# Patient Record
Sex: MALE | Race: WHITE | NOT HISPANIC OR LATINO | Employment: OTHER | URBAN - NONMETROPOLITAN AREA
[De-identification: names, ages, dates, MRNs, and addresses within clinical notes are randomized per-mention and may not be internally consistent; named-entity substitution may affect disease eponyms.]

---

## 2020-12-10 ENCOUNTER — OFFICE VISIT (OUTPATIENT)
Dept: CARDIOLOGY CLINIC | Facility: CLINIC | Age: 65
End: 2020-12-10
Payer: COMMERCIAL

## 2020-12-10 VITALS — HEART RATE: 53 BPM | DIASTOLIC BLOOD PRESSURE: 88 MMHG | SYSTOLIC BLOOD PRESSURE: 136 MMHG | WEIGHT: 246 LBS

## 2020-12-10 DIAGNOSIS — J45.909 UNCOMPLICATED ASTHMA, UNSPECIFIED ASTHMA SEVERITY, UNSPECIFIED WHETHER PERSISTENT: ICD-10-CM

## 2020-12-10 DIAGNOSIS — I10 ESSENTIAL HYPERTENSION: Primary | ICD-10-CM

## 2020-12-10 DIAGNOSIS — R53.83 FATIGUE, UNSPECIFIED TYPE: ICD-10-CM

## 2020-12-10 DIAGNOSIS — R20.2 PARESTHESIAS: ICD-10-CM

## 2020-12-10 DIAGNOSIS — Z95.1 S/P CABG (CORONARY ARTERY BYPASS GRAFT): ICD-10-CM

## 2020-12-10 DIAGNOSIS — I25.10 CORONARY ARTERY DISEASE INVOLVING NATIVE CORONARY ARTERY OF NATIVE HEART WITHOUT ANGINA PECTORIS: ICD-10-CM

## 2020-12-10 DIAGNOSIS — E78.2 MIXED HYPERLIPIDEMIA: ICD-10-CM

## 2020-12-10 PROCEDURE — 99205 OFFICE O/P NEW HI 60 MIN: CPT | Performed by: PHYSICIAN ASSISTANT

## 2020-12-10 PROCEDURE — 93000 ELECTROCARDIOGRAM COMPLETE: CPT | Performed by: PHYSICIAN ASSISTANT

## 2020-12-10 RX ORDER — METOPROLOL SUCCINATE 25 MG/1
25 TABLET, EXTENDED RELEASE ORAL DAILY
Qty: 30 TABLET | Refills: 5 | Status: SHIPPED | OUTPATIENT
Start: 2020-12-10 | End: 2021-01-21

## 2020-12-10 RX ORDER — EZETIMIBE 10 MG/1
10 TABLET ORAL DAILY
COMMUNITY
End: 2020-12-10

## 2020-12-10 RX ORDER — BUDESONIDE AND FORMOTEROL FUMARATE DIHYDRATE 160; 4.5 UG/1; UG/1
2 AEROSOL RESPIRATORY (INHALATION) 2 TIMES DAILY
Qty: 1 INHALER | Refills: 3 | Status: SHIPPED | OUTPATIENT
Start: 2020-12-10 | End: 2021-03-04 | Stop reason: SDUPTHER

## 2020-12-10 RX ORDER — AMLODIPINE BESYLATE 5 MG/1
5 TABLET ORAL DAILY
COMMUNITY
End: 2020-12-10

## 2020-12-10 RX ORDER — LOSARTAN POTASSIUM 50 MG/1
50 TABLET ORAL DAILY
Start: 2020-12-10 | End: 2021-03-03 | Stop reason: SDUPTHER

## 2020-12-10 RX ORDER — METOPROLOL SUCCINATE 100 MG/1
100 TABLET, EXTENDED RELEASE ORAL DAILY
COMMUNITY
End: 2020-12-10

## 2020-12-10 RX ORDER — ASPIRIN 81 MG/1
81 TABLET ORAL DAILY
COMMUNITY
End: 2022-07-28 | Stop reason: SDUPTHER

## 2020-12-10 RX ORDER — LOSARTAN POTASSIUM 100 MG/1
25 TABLET ORAL DAILY
COMMUNITY
End: 2020-12-10

## 2020-12-10 RX ORDER — ALBUTEROL SULFATE 90 UG/1
2 AEROSOL, METERED RESPIRATORY (INHALATION) EVERY 6 HOURS PRN
COMMUNITY
End: 2020-12-10 | Stop reason: SDUPTHER

## 2020-12-10 RX ORDER — ALBUTEROL SULFATE 90 UG/1
2 AEROSOL, METERED RESPIRATORY (INHALATION) EVERY 6 HOURS PRN
Qty: 1 INHALER | Refills: 3 | Status: SHIPPED | OUTPATIENT
Start: 2020-12-10

## 2020-12-10 RX ORDER — CLOPIDOGREL BISULFATE 75 MG/1
75 TABLET ORAL DAILY
COMMUNITY
End: 2020-12-10

## 2020-12-10 RX ORDER — BUDESONIDE AND FORMOTEROL FUMARATE DIHYDRATE 160; 4.5 UG/1; UG/1
2 AEROSOL RESPIRATORY (INHALATION) 2 TIMES DAILY
COMMUNITY
End: 2020-12-10 | Stop reason: SDUPTHER

## 2020-12-29 ENCOUNTER — CLINICAL SUPPORT (OUTPATIENT)
Dept: CARDIAC REHAB | Facility: HOSPITAL | Age: 65
End: 2020-12-29
Payer: COMMERCIAL

## 2020-12-29 DIAGNOSIS — Z95.1 S/P CABG (CORONARY ARTERY BYPASS GRAFT): ICD-10-CM

## 2020-12-29 PROCEDURE — 93797 PHYS/QHP OP CAR RHAB WO ECG: CPT

## 2020-12-31 ENCOUNTER — CLINICAL SUPPORT (OUTPATIENT)
Dept: CARDIAC REHAB | Facility: HOSPITAL | Age: 65
End: 2020-12-31
Payer: COMMERCIAL

## 2020-12-31 DIAGNOSIS — Z95.1 S/P CABG (CORONARY ARTERY BYPASS GRAFT): ICD-10-CM

## 2020-12-31 PROCEDURE — 93798 PHYS/QHP OP CAR RHAB W/ECG: CPT

## 2021-01-04 ENCOUNTER — CLINICAL SUPPORT (OUTPATIENT)
Dept: CARDIAC REHAB | Facility: HOSPITAL | Age: 66
End: 2021-01-04
Payer: COMMERCIAL

## 2021-01-04 DIAGNOSIS — Z95.1 S/P CABG (CORONARY ARTERY BYPASS GRAFT): ICD-10-CM

## 2021-01-04 PROCEDURE — 93798 PHYS/QHP OP CAR RHAB W/ECG: CPT

## 2021-01-06 ENCOUNTER — CLINICAL SUPPORT (OUTPATIENT)
Dept: CARDIAC REHAB | Facility: HOSPITAL | Age: 66
End: 2021-01-06
Payer: COMMERCIAL

## 2021-01-06 DIAGNOSIS — Z95.1 S/P CABG (CORONARY ARTERY BYPASS GRAFT): ICD-10-CM

## 2021-01-06 PROCEDURE — 93798 PHYS/QHP OP CAR RHAB W/ECG: CPT

## 2021-01-08 ENCOUNTER — TELEPHONE (OUTPATIENT)
Dept: NEUROLOGY | Facility: CLINIC | Age: 66
End: 2021-01-08

## 2021-01-08 NOTE — TELEPHONE ENCOUNTER
Tried to call patient and set up an appointment (for a referral), but patient's phone isn't working and there is no option to leave a voicemail

## 2021-01-11 ENCOUNTER — CLINICAL SUPPORT (OUTPATIENT)
Dept: CARDIAC REHAB | Facility: HOSPITAL | Age: 66
End: 2021-01-11
Payer: COMMERCIAL

## 2021-01-11 DIAGNOSIS — Z95.1 S/P CABG (CORONARY ARTERY BYPASS GRAFT): ICD-10-CM

## 2021-01-11 PROCEDURE — 93798 PHYS/QHP OP CAR RHAB W/ECG: CPT

## 2021-01-13 ENCOUNTER — CLINICAL SUPPORT (OUTPATIENT)
Dept: CARDIAC REHAB | Facility: HOSPITAL | Age: 66
End: 2021-01-13
Payer: COMMERCIAL

## 2021-01-13 DIAGNOSIS — Z95.1 S/P CABG (CORONARY ARTERY BYPASS GRAFT): ICD-10-CM

## 2021-01-13 PROCEDURE — 93798 PHYS/QHP OP CAR RHAB W/ECG: CPT

## 2021-01-15 ENCOUNTER — CLINICAL SUPPORT (OUTPATIENT)
Dept: CARDIAC REHAB | Facility: HOSPITAL | Age: 66
End: 2021-01-15
Payer: COMMERCIAL

## 2021-01-15 DIAGNOSIS — Z95.1 S/P CABG (CORONARY ARTERY BYPASS GRAFT): ICD-10-CM

## 2021-01-15 DIAGNOSIS — I25.10 CORONARY ARTERY DISEASE INVOLVING NATIVE CORONARY ARTERY OF NATIVE HEART WITHOUT ANGINA PECTORIS: Primary | ICD-10-CM

## 2021-01-15 PROCEDURE — 93798 PHYS/QHP OP CAR RHAB W/ECG: CPT

## 2021-01-18 ENCOUNTER — CLINICAL SUPPORT (OUTPATIENT)
Dept: CARDIAC REHAB | Facility: HOSPITAL | Age: 66
End: 2021-01-18
Payer: COMMERCIAL

## 2021-01-18 ENCOUNTER — LAB (OUTPATIENT)
Dept: LAB | Facility: HOSPITAL | Age: 66
End: 2021-01-18
Payer: COMMERCIAL

## 2021-01-18 DIAGNOSIS — Z95.1 S/P CABG (CORONARY ARTERY BYPASS GRAFT): ICD-10-CM

## 2021-01-18 DIAGNOSIS — E78.2 MIXED HYPERLIPIDEMIA: ICD-10-CM

## 2021-01-18 DIAGNOSIS — R53.83 FATIGUE, UNSPECIFIED TYPE: ICD-10-CM

## 2021-01-18 LAB
BASOPHILS # BLD AUTO: 0 THOUSANDS/ΜL (ref 0–0.1)
BASOPHILS NFR BLD AUTO: 1 % (ref 0–2)
CHOLEST SERPL-MCNC: 244 MG/DL (ref 0–200)
EOSINOPHIL # BLD AUTO: 0.1 THOUSAND/ΜL (ref 0–0.61)
EOSINOPHIL NFR BLD AUTO: 2 % (ref 0–5)
ERYTHROCYTE [DISTWIDTH] IN BLOOD BY AUTOMATED COUNT: 14 % (ref 11.5–14.5)
HCT VFR BLD AUTO: 50.3 % (ref 42–47)
HDLC SERPL-MCNC: 49 MG/DL
HGB BLD-MCNC: 16.5 G/DL (ref 14–18)
LDLC SERPL CALC-MCNC: 155 MG/DL (ref 0–100)
LYMPHOCYTES # BLD AUTO: 1.3 THOUSANDS/ΜL (ref 0.6–4.47)
LYMPHOCYTES NFR BLD AUTO: 22 % (ref 21–51)
MCH RBC QN AUTO: 29.3 PG (ref 26–34)
MCHC RBC AUTO-ENTMCNC: 32.8 G/DL (ref 31–37)
MCV RBC AUTO: 89 FL (ref 81–99)
MONOCYTES # BLD AUTO: 0.5 THOUSAND/ΜL (ref 0.17–1.22)
MONOCYTES NFR BLD AUTO: 8 % (ref 2–12)
NEUTROPHILS # BLD AUTO: 4 THOUSANDS/ΜL (ref 1.4–6.5)
NEUTS SEG NFR BLD AUTO: 68 % (ref 42–75)
PLATELET # BLD AUTO: 199 THOUSANDS/UL (ref 149–390)
PMV BLD AUTO: 7.6 FL (ref 8.6–11.7)
RBC # BLD AUTO: 5.63 MILLION/UL (ref 4.3–5.9)
T4 FREE SERPL-MCNC: 0.98 NG/DL (ref 0.76–1.46)
TRIGL SERPL-MCNC: 199 MG/DL (ref 44–166)
TSH SERPL DL<=0.05 MIU/L-ACNC: 2.32 UIU/ML (ref 0.45–5.33)
WBC # BLD AUTO: 5.9 THOUSAND/UL (ref 4.8–10.8)

## 2021-01-18 PROCEDURE — 80061 LIPID PANEL: CPT

## 2021-01-18 PROCEDURE — 93798 PHYS/QHP OP CAR RHAB W/ECG: CPT

## 2021-01-18 PROCEDURE — 84443 ASSAY THYROID STIM HORMONE: CPT

## 2021-01-18 PROCEDURE — 84439 ASSAY OF FREE THYROXINE: CPT

## 2021-01-18 PROCEDURE — 36415 COLL VENOUS BLD VENIPUNCTURE: CPT | Performed by: PHYSICIAN ASSISTANT

## 2021-01-18 PROCEDURE — 85025 COMPLETE CBC W/AUTO DIFF WBC: CPT | Performed by: PHYSICIAN ASSISTANT

## 2021-01-20 ENCOUNTER — APPOINTMENT (OUTPATIENT)
Dept: CARDIAC REHAB | Facility: HOSPITAL | Age: 66
End: 2021-01-20
Payer: COMMERCIAL

## 2021-01-21 ENCOUNTER — OFFICE VISIT (OUTPATIENT)
Dept: CARDIOLOGY CLINIC | Facility: CLINIC | Age: 66
End: 2021-01-21
Payer: COMMERCIAL

## 2021-01-21 VITALS
HEART RATE: 80 BPM | HEIGHT: 76 IN | BODY MASS INDEX: 30.2 KG/M2 | DIASTOLIC BLOOD PRESSURE: 80 MMHG | WEIGHT: 248 LBS | SYSTOLIC BLOOD PRESSURE: 160 MMHG

## 2021-01-21 DIAGNOSIS — Z95.1 S/P CABG (CORONARY ARTERY BYPASS GRAFT): ICD-10-CM

## 2021-01-21 DIAGNOSIS — E78.2 MIXED HYPERLIPIDEMIA: Primary | ICD-10-CM

## 2021-01-21 DIAGNOSIS — I10 ESSENTIAL HYPERTENSION: ICD-10-CM

## 2021-01-21 DIAGNOSIS — I25.10 CORONARY ARTERY DISEASE INVOLVING NATIVE CORONARY ARTERY OF NATIVE HEART WITHOUT ANGINA PECTORIS: ICD-10-CM

## 2021-01-21 PROCEDURE — 99215 OFFICE O/P EST HI 40 MIN: CPT | Performed by: PHYSICIAN ASSISTANT

## 2021-01-21 RX ORDER — METOPROLOL SUCCINATE 25 MG/1
12.5 TABLET, EXTENDED RELEASE ORAL DAILY
Qty: 30 TABLET | Refills: 5 | Status: SHIPPED | OUTPATIENT
Start: 2021-01-21 | End: 2021-03-03 | Stop reason: SDUPTHER

## 2021-01-21 RX ORDER — TICAGRELOR 90 MG/1
90 TABLET ORAL ONCE
COMMUNITY
Start: 2021-01-10 | End: 2021-01-21

## 2021-01-21 RX ORDER — ATORVASTATIN CALCIUM 40 MG/1
40 TABLET, FILM COATED ORAL DAILY
Qty: 30 TABLET | Refills: 3 | Status: SHIPPED | OUTPATIENT
Start: 2021-01-21 | End: 2021-03-04

## 2021-01-21 RX ORDER — METOPROLOL SUCCINATE 25 MG/1
12.5 TABLET, EXTENDED RELEASE ORAL DAILY
Qty: 30 TABLET | Refills: 5 | Status: SHIPPED | OUTPATIENT
Start: 2021-01-21 | End: 2021-01-21

## 2021-01-21 NOTE — PATIENT INSTRUCTIONS
Return to taking metoprolol 12 5 mg daily at night   And Brilinta 60 mg twice daily    Return in one month for assessment of HR and BP     Check cholesterol levels in 4 months   Other Blood work  Must be fasting

## 2021-01-21 NOTE — ASSESSMENT & PLAN NOTE
Cath 2019: stent to OM2 and LCx  Cath 4/2020 stent to RCA and OM1   Now on Brilinta    Reduced to 90 mg daily on his own    Will go back to 60 BID     Ok to discontinue in April--a year from SHERIF  Robotic CABG 4/2020: LIMA-LAD-Diag    Stable without angina  Patient stopped metoprolol on his own and BP is now elevated  He will resume at 12 5 mg qHS

## 2021-01-21 NOTE — PROGRESS NOTES
[facility]   Outpatient Note  Viktoria Shaikh  1955  [id number]  [facility] [address] [place] [address] Drive, [address]  Laly Shaikh is a 72 y o  male    Assessment and Plan:   Coronary artery disease involving native coronary artery of native heart without angina pectoris  Cath 2019: stent to OM2 and LCx  Cath 4/2020 stent to RCA and OM1   Now on Brilinta    Reduced to 90 mg daily on his own    Will go back to 60 BID     Ok to discontinue in April--a year from SHERIF  Robotic CABG 4/2020: LIMA-LAD-Diag    Stable without angina  Patient stopped metoprolol on his own and BP is now elevated  He will resume at 12 5 mg qHS  S/P CABG (coronary artery bypass graft)  Robotic CABG x2 4/2020: LIMA-LAD-Diag   Stable without symptoms     Essential hypertension  Not optimally controlled at this point   He states that it is better controlled at home  He has stopped metoprolol on his own and is advised to resume   See comments above      Mixed hyperlipidemia  Not on a statin   LDL is 155  Will start lipitor 40 mg and check lipid profile in 4 months         Additional Plan:   Medications as detailed above  Pertinent testing orders as outlined  Available lab results are reviewed with the patient and any additional required labs are ordered as above  Return visit will be in one month with EKG or earlier if there are problems  The patient is encouraged to call in the meantime if there are questions or concerns  Subjective: The patient is seen in the office for a routine follow up regarding known CAD with streting in 2019 to OM2 and LCx and in 4/2020 to RCA and OM  He also had Robotic CABG x2 4/2020 with  LIMA to LAD and the diagonal vessels  EF on Cath was 55%  In addition to severe asthma, he has HTN and HLD  Labs show that LDL is 155    Prior to his NSTEMI in April he had paresthesias of both upper and lower extremities that mostly resolved with CABG  He has residual tingling sensation in the "A-finger" He has been very fatigues since his surgery and does not feel he has the energy to get his daily activities done  Otherwise he is recovering well  From a cardiac perspective, he is without complaints of chest pain or exertional dyspnea  He has no palpitations syncope or near syncope, and denies edema orthopnea or PND  He does not complain of TIA or CVA symptoms  Hypertension  Pertinent negatives include no chest pain, malaise/fatigue, orthopnea, palpitations, PND or shortness of breath  Coronary Artery Disease  Pertinent negatives include no chest pain, leg swelling, palpitations or shortness of breath  Social History  Social History     Tobacco Use   Smoking Status Never Smoker   Smokeless Tobacco Never Used   ,   Social History     Substance and Sexual Activity   Alcohol Use None   ,   Social History     Substance and Sexual Activity   Drug Use Not on file     Family History   Problem Relation Age of Onset    Heart disease Mother     Heart disease Father        Medical and Surgical History  Past Medical History:   Diagnosis Date    [facility] [address] )     Coronary artery disease     HTN (hypertension)     Hyperlipidemia     Myocardial infarction ([facility] [address] ) 02/2019    Stroke [facility])      Past Surgical History:   Procedure Laterality Date    CARDIAC CATHETERIZATION  04/17/2020    RCA: 80% distal lesion  LCX/OM2 stent placement  LAD with 70-80% lesion at D1 bifurcation   CARDIAC CATHETERIZATION  2019    OM2 and proximal LCX stenting      CORONARY ARTERY BYPASS GRAFT      CORONARY STENT PLACEMENT  2019    OM2 and proximal LCX stenting         Current Outpatient Medications:     albuterol (PROVENTIL HFA,VENTOLIN HFA) 90 mcg/act inhaler, Inhale 2 puffs every 6 (six) hours as needed for wheezing, Disp: 1 Inhaler, Rfl: 3    budesonide-formoterol (SYMBICORT) 160-4 5 mcg/act inhaler, Inhale 2 puffs 2 (two) times a day Rinse mouth after use , Disp: 1 Inhaler, Rfl: 3    losartan (COZAAR) 50 mg tablet, Take 1 tablet (50 mg total) by mouth daily, Disp: , Rfl:     aspirin (ECOTRIN LOW STRENGTH) 81 mg EC tablet, Take 81 mg by mouth daily, Disp: , Rfl:     atorvastatin (LIPITOR) 40 mg tablet, Take 1 tablet (40 mg total) by mouth daily, Disp: 30 tablet, Rfl: 3    metoprolol succinate (TOPROL-XL) 25 mg 24 hr tablet, Take 0 5 tablets (12 5 mg total) by mouth daily, Disp: 30 tablet, Rfl: 5    ticagrelor (Brilinta) 60 MG, Take 1 tablet (60 mg total) by mouth every 12 (twelve) hours, Disp: 60 tablet, Rfl: 5  No Known Allergies    Review of Systems   Constitution: Negative for malaise/fatigue  HENT: Negative  Eyes: Negative  Cardiovascular: Negative  Negative for chest pain, claudication, cyanosis, dyspnea on exertion, irregular heartbeat, leg swelling, near-syncope, orthopnea, palpitations, paroxysmal nocturnal dyspnea and syncope  Respiratory: Negative  Negative for cough, hemoptysis, shortness of breath, sleep disturbances due to breathing, snoring, sputum production and wheezing  Endocrine: Negative  Hematologic/Lymphatic: Negative  Skin: Negative  Musculoskeletal: Negative  Gastrointestinal: Negative  Genitourinary: Negative  Neurological: Positive for paresthesias (left hand "A finger")  Psychiatric/Behavioral: Negative  Allergic/Immunologic: Negative  Objective:   /80   Pulse 80   Ht 6' 4" (1 93 m)   Wt 112 kg (248 lb)   BMI 30 19 kg/m²   Physical Exam   Constitutional: He is oriented to person, place, and time  He appears well-developed and well-nourished  HENT:   Head: Normocephalic and atraumatic  Mouth/Throat: Oropharynx is clear and moist    Eyes: Conjunctivae are normal  No scleral icterus  Neck: Normal range of motion  Neck supple  No JVD present  No thyromegaly present     Cardiovascular: Regular rhythm, S1 normal, S2 normal and normal heart sounds  Bradycardia present  Exam reveals no gallop and no friction rub  No murmur heard  Pulmonary/Chest: No respiratory distress  He has no wheezes  He has no rales  Abdominal: Soft  Bowel sounds are normal  He exhibits no distension  There is no abdominal tenderness  Aorta not palpable   Musculoskeletal: Normal range of motion  General: No tenderness, deformity or edema  Neurological: He is alert and oriented to person, place, and time  Skin: Skin is warm and dry  Psychiatric: He has a normal mood and affect  His behavior is normal    Nursing note and vitals reviewed        Lab Review:   No results found for: CHOL  Lab Results   Component Value Date    HDL 49 01/18/2021     Lab Results   Component Value Date    LDLCALC 155 (H) 01/18/2021     Lab Results   Component Value Date    TRIG 199 (H) 01/18/2021     Results Reviewed     None        Results Reviewed     None        Results Reviewed     None          Recent Cardiovascular Testing:   Cath 2-: stent to OM2 and LCx    Cath 4/17/2020 stent to RCA and OM1    Robotic CABG 4/27/2020: LIMA-LAD-Diag      ECG Review:   Sinus bradycardia

## 2021-01-21 NOTE — ASSESSMENT & PLAN NOTE
Not optimally controlled at this point   He states that it is better controlled at home  He has stopped metoprolol on his own and is advised to resume   See comments above

## 2021-01-22 ENCOUNTER — LAB (OUTPATIENT)
Dept: LAB | Facility: HOSPITAL | Age: 66
End: 2021-01-22
Payer: COMMERCIAL

## 2021-01-22 ENCOUNTER — CLINICAL SUPPORT (OUTPATIENT)
Dept: CARDIAC REHAB | Facility: HOSPITAL | Age: 66
End: 2021-01-22
Payer: COMMERCIAL

## 2021-01-22 DIAGNOSIS — E78.2 MIXED HYPERLIPIDEMIA: Primary | ICD-10-CM

## 2021-01-22 DIAGNOSIS — I25.10 CORONARY ARTERY DISEASE INVOLVING NATIVE CORONARY ARTERY OF NATIVE HEART WITHOUT ANGINA PECTORIS: Primary | ICD-10-CM

## 2021-01-22 DIAGNOSIS — Z95.1 S/P CABG (CORONARY ARTERY BYPASS GRAFT): ICD-10-CM

## 2021-01-22 DIAGNOSIS — I10 ESSENTIAL HYPERTENSION: ICD-10-CM

## 2021-01-22 DIAGNOSIS — E78.2 MIXED HYPERLIPIDEMIA: ICD-10-CM

## 2021-01-22 LAB
ALBUMIN SERPL BCP-MCNC: 4.5 G/DL (ref 3.5–5.7)
ALP SERPL-CCNC: 62 U/L (ref 55–165)
ALT SERPL W P-5'-P-CCNC: 15 U/L (ref 7–52)
ANION GAP SERPL CALCULATED.3IONS-SCNC: 7 MMOL/L (ref 4–13)
AST SERPL W P-5'-P-CCNC: 16 U/L (ref 13–39)
BILIRUB SERPL-MCNC: 1.9 MG/DL (ref 0.2–1)
BUN SERPL-MCNC: 13 MG/DL (ref 7–25)
CALCIUM SERPL-MCNC: 9.9 MG/DL (ref 8.6–10.5)
CHLORIDE SERPL-SCNC: 98 MMOL/L (ref 98–107)
CHOLEST SERPL-MCNC: 228 MG/DL (ref 0–200)
CO2 SERPL-SCNC: 32 MMOL/L (ref 21–31)
CREAT SERPL-MCNC: 1.21 MG/DL (ref 0.7–1.3)
EST. AVERAGE GLUCOSE BLD GHB EST-MCNC: 126 MG/DL
GFR SERPL CREATININE-BSD FRML MDRD: 62 ML/MIN/1.73SQ M
GLUCOSE P FAST SERPL-MCNC: 102 MG/DL (ref 65–99)
HBA1C MFR BLD: 6 %
HDLC SERPL-MCNC: 48 MG/DL
LDLC SERPL CALC-MCNC: 133 MG/DL (ref 0–100)
POTASSIUM SERPL-SCNC: 4.1 MMOL/L (ref 3.5–5.5)
PROT SERPL-MCNC: 7.3 G/DL (ref 6.4–8.9)
SODIUM SERPL-SCNC: 137 MMOL/L (ref 134–143)
TRIGL SERPL-MCNC: 234 MG/DL (ref 44–166)

## 2021-01-22 PROCEDURE — 80061 LIPID PANEL: CPT

## 2021-01-22 PROCEDURE — 80053 COMPREHEN METABOLIC PANEL: CPT

## 2021-01-22 PROCEDURE — 36415 COLL VENOUS BLD VENIPUNCTURE: CPT | Performed by: PHYSICIAN ASSISTANT

## 2021-01-22 PROCEDURE — 83036 HEMOGLOBIN GLYCOSYLATED A1C: CPT | Performed by: PHYSICIAN ASSISTANT

## 2021-01-22 PROCEDURE — 93798 PHYS/QHP OP CAR RHAB W/ECG: CPT

## 2021-01-25 ENCOUNTER — CLINICAL SUPPORT (OUTPATIENT)
Dept: CARDIAC REHAB | Facility: HOSPITAL | Age: 66
End: 2021-01-25
Payer: COMMERCIAL

## 2021-01-25 DIAGNOSIS — Z95.1 S/P CABG (CORONARY ARTERY BYPASS GRAFT): ICD-10-CM

## 2021-01-25 PROCEDURE — 93798 PHYS/QHP OP CAR RHAB W/ECG: CPT

## 2021-01-27 ENCOUNTER — CLINICAL SUPPORT (OUTPATIENT)
Dept: CARDIAC REHAB | Facility: HOSPITAL | Age: 66
End: 2021-01-27
Payer: COMMERCIAL

## 2021-01-27 DIAGNOSIS — Z95.1 S/P CABG (CORONARY ARTERY BYPASS GRAFT): ICD-10-CM

## 2021-01-27 PROCEDURE — 93798 PHYS/QHP OP CAR RHAB W/ECG: CPT

## 2021-01-27 NOTE — PROGRESS NOTES
Cardiac Rehabilitation Plan of Care   30 Day Reassessment          Today's date: 2021   # of Exercise Sessions Completed:   Due to deductible patient has agreed to attend 2X week for 18 visits TO 24  Patient name: Charity Burns      : 1955  Age: 72 y o  MRN: [id number]  Referring Physician: John Archibald PA-C  Cardiologist: Dr Miles Augustin MD  Provider: [facility]  Clinician: ZHENG Basurto    Dx: S/P CABG X 2 2020  Hx SHERIF X2-RCA & OM1 2020  NSTEMI  Hx SHERIF 2019  HTN    Date of onset: 2020      SUMMARY OF PROGRESS:  Patient has completed 111 visits as of  this date  He performs his sessions  w/correct hemodynamic responses  His resting vitals were HR 76 and /72  His peak vitals were  and /72  His recovery vitals were HR 88 and /68  He performs his sessions on room air and is able to maintain his 02 saturation at 96% or greater  He rates his program a 4 to 5 a 4 on a 1-10 RPE Scale  He denies any symptoms  He is able to work consistently at a 3 95 Met Level  Nelson Rico has been  a good rehabilitation candidate due to high prior level of function, willingness to progress and support system  His program will continue to  be progressed as he is able to tolerate  He will continue to  receive education specific to his disease process  Nelson Rico has been progressing well in CR  He gives great effort during each session  He is working at a 3 95 MET Level  He has been asymptomatic  Telemetry reveals NSR w/T Wave Inversion  Medication compliance: Yes   Comments: Pt reports to be compliant with medications  Fall Risk: Low   Comments: Ambulates with a steady gait with no assist device    EKG changes: Telemetry reveals NSR  History of Sinus Bradycardia; Betablocker recently decreased by half w/good results      EXERCISE ASSESSMENT and PLAN    Current Exercise Program in Rehab:       Frequency: 3 days/week Supplement with home exercise 2+ days/wk as tolerated       Minutes: 40-45       METS: 3 5 to 4           HR: 20-30 > RHR 85-95   RPE: 4-6        Modalities: Treadmill, Airdyne bike, UBE, Lifecycle, NuStep and Recumbent bike      Exercise Progression 30 Day Goals :    Frequency: 3 days/week of cardiac rehab     Supplement with home exercise 2+ days/wk as tolerated    Minutes: 45 to 50                       >150 mins/wk of moderate intensity exercise   METS: 4 to 4 5   HR: 85-95 RPE:4-6   Modalities: Treadmill, Airdyne bike, UBE, Lifecycle, NuStep and Recumbent bike    Strength trainin-3 days / week  12-15 repetitions  1-2 sets per modality    Modalities: Leg Press    Home Exercise: Patient has been perfroming a walking program weather permitting       Goals: 10% improvement in functional capacity - based on max METs achieved in fitness assessment, Reduced dyspnea with physical activity  0-1/10, improved DASI score by 10%, Exercise 5 days/wk, >150mins/wk of moderate intensity exercise and Attend Rehab regularly    Progression Toward Goals:  Reviewed Pt goals and determined plan of care    Education: Benefit of exercise for CAD risk factors   Plan:education on home exercise guidelines, home exercise 30+ mins 2 days opposite CR and Education class: Risk Factors for Heart Disease  Readiness to change: Action:  (Changing behavior)      NUTRITION ASSESSMENT AND PLAN    Weight control:    Starting weight: 248   Current weight:   246  Waist circumference:    Starting:NA   Current:  NA  Diabetes: N/A  Lipid management: Discussed diet and lipid management    Goals:LDL <100, HDL >40, TRG <150, CHOL <200, fasting BG , improved A1c  < 7 0% and Improved Rate Your Plate score  >13    Progression Toward Goals: Reviewed Pt goals and determined plan of care    Education: heart healthy eating  low sodium diet  hydration  nutrition for  lipid management  nutrition for Improved BG control  exercise and diabetes management   Plan: Education class: Reading Food Labels, Education Class: Heart Healthy Eating, replace butter with soft spreads made with olive oil, canola or yogurt, replace refined grain bread with whole grain bread, replace unhealthy snacks with fruits & vegs, eat fewer desserts and sweets, avoid processed foods, will try new grains like brown rice, quinoa, farro, drink more water and keep added daily sugar <25g/day  Readiness to change: Preparation:  (Getting ready to change)       PSYCHOSOCIAL ASSESSMENT AND PLAN    Emotional:  Depression assessment:  PHQ-9 = 1-4 = Minimal Depression            Anxiety measure:  KATE-7 = 0-4  = Not anxious  Self-reported stress level:  5  Social support: Very Good    Goals:  Reduce perceived stress to 1-3/10, improved [facility] QOL < 27, PHQ-9 - reduced severity by one level, continue medical therapy, Physical Fitness in [facility] Score < 3, Social Support in [facility] Score < 3, Daily Activity in [facility] Score < 3, Overall Health in [facility] Score < 3, Quality of Life in [facility] Score < 3 , Change in Health in [facility] Score < 3  and improved positive thoughts of well being    Progression Toward Goals: Reviewed Pt goals and determined plan of care    Education: signs/sxs of depression, benefits of a positive support system, stress management techniques and class:  Stress and Your Health   Plan: Class: Stress and Your Health, Class: Relaxation, Practice relaxation techniques and Exercise  Readiness to change: Action:  (Changing behavior)      OTHER CORE COMPONENTS     Tobacco:   Social History     Tobacco Use   Smoking Status Never Smoker   Smokeless Tobacco Never Used       Tobacco Use Intervention:   N/A:  Patient is a non-smoker     Anginal Symptoms:  None   NTG use: No prescription    Blood pressure:    Restin/72   Exercise: 160/72   Recovery: 126/68    Goals: consistent BP < 130/80, reduced dietary sodium <2300mg, moderate intensity exercise >150 mins/wk and medication compliance    Progression Toward Goals: Reviewed Pt goals and determined plan of care    Education:  understanding high blood pressure and it's relationship to CAD, low sodium diet and HTN, Education class:  Common Heart Medications and Education class: Understanding Heart Disease  Plan: Class: Understanding Heart Disease and Class: Common Heart Medications  Readiness to change: Action:  (Changing behavior)

## 2021-02-01 ENCOUNTER — APPOINTMENT (OUTPATIENT)
Dept: CARDIAC REHAB | Facility: HOSPITAL | Age: 66
End: 2021-02-01
Payer: COMMERCIAL

## 2021-02-03 ENCOUNTER — CLINICAL SUPPORT (OUTPATIENT)
Dept: CARDIAC REHAB | Facility: HOSPITAL | Age: 66
End: 2021-02-03
Payer: COMMERCIAL

## 2021-02-03 DIAGNOSIS — Z95.1 S/P CABG (CORONARY ARTERY BYPASS GRAFT): ICD-10-CM

## 2021-02-03 PROCEDURE — 93798 PHYS/QHP OP CAR RHAB W/ECG: CPT

## 2021-02-10 ENCOUNTER — CLINICAL SUPPORT (OUTPATIENT)
Dept: CARDIAC REHAB | Facility: HOSPITAL | Age: 66
End: 2021-02-10
Payer: COMMERCIAL

## 2021-02-10 DIAGNOSIS — Z95.1 S/P CABG (CORONARY ARTERY BYPASS GRAFT): ICD-10-CM

## 2021-02-10 PROCEDURE — 93798 PHYS/QHP OP CAR RHAB W/ECG: CPT

## 2021-02-12 ENCOUNTER — CLINICAL SUPPORT (OUTPATIENT)
Dept: CARDIAC REHAB | Facility: HOSPITAL | Age: 66
End: 2021-02-12
Payer: COMMERCIAL

## 2021-02-12 DIAGNOSIS — Z95.1 S/P CABG (CORONARY ARTERY BYPASS GRAFT): ICD-10-CM

## 2021-02-12 PROCEDURE — 93798 PHYS/QHP OP CAR RHAB W/ECG: CPT

## 2021-02-17 ENCOUNTER — CLINICAL SUPPORT (OUTPATIENT)
Dept: CARDIAC REHAB | Facility: HOSPITAL | Age: 66
End: 2021-02-17
Payer: COMMERCIAL

## 2021-02-17 DIAGNOSIS — Z95.1 S/P CABG (CORONARY ARTERY BYPASS GRAFT): ICD-10-CM

## 2021-02-17 PROCEDURE — 93798 PHYS/QHP OP CAR RHAB W/ECG: CPT

## 2021-02-18 ENCOUNTER — OFFICE VISIT (OUTPATIENT)
Dept: CARDIOLOGY CLINIC | Facility: CLINIC | Age: 66
End: 2021-02-18
Payer: COMMERCIAL

## 2021-02-18 VITALS — SYSTOLIC BLOOD PRESSURE: 129 MMHG | DIASTOLIC BLOOD PRESSURE: 89 MMHG | HEART RATE: 68 BPM

## 2021-02-18 DIAGNOSIS — I25.10 CORONARY ARTERY DISEASE INVOLVING NATIVE CORONARY ARTERY OF NATIVE HEART WITHOUT ANGINA PECTORIS: Primary | ICD-10-CM

## 2021-02-18 PROCEDURE — 99214 OFFICE O/P EST MOD 30 MIN: CPT | Performed by: PHYSICIAN ASSISTANT

## 2021-02-18 RX ORDER — NITROGLYCERIN 0.4 MG/1
0.4 TABLET SUBLINGUAL
Qty: 25 TABLET | Refills: 3 | Status: SHIPPED | OUTPATIENT
Start: 2021-02-18 | End: 2022-06-22

## 2021-02-18 NOTE — PROGRESS NOTES
Virtual Brief Visit    Assessment/Plan:  Coronary artery disease involving native coronary artery of native heart without angina pectoris  Cath 2019: stent to OM2 and LCx  Cath 4/2020 stent to RCA and OM1   Now on Brilinta    Reduced to 90 mg daily on his own    Will go back to 60 BID     Ok to discontinue in April--a year from SHERIF  Robotic CABG 4/2020: LIMA-LAD-Diag    Possible anginal symptoms   Feeling very fatigued with exertion    Similar to feelings prior to intervention    Will schedule KOBY to be sure there is no ischemic cause       Mixed hyperlipidemia  Continues to tolerate statin therapy     S/P CABG (coronary artery bypass graft)  Robotic CABG x2 4/2020: LIMA-LAD-Diag   Now with fatigue on exertion   See comments     Essential hypertension  Much better controlled         Reason for visit is   Chief Complaint   Patient presents with    Fatigue    Virtual Brief Visit        Encounter provider Daljit Morales PA-C    Provider located at [address] [address]    Recent Visits  No visits were found meeting these conditions  Showing recent visits within past 7 days and meeting all other requirements     Today's Visits  Date Type Provider Dept   02/18/21 Office Visit Daljit Morales PA-C [facility]   Showing today's visits and meeting all other requirements     Future Appointments  No visits were found meeting these conditions  Showing future appointments within next 150 days and meeting all other requirements        After connecting through telephone, the patient was identified by name and date of birth  Charity Burns was informed that this is a telemedicine visit and that the visit is being conducted through telephone  My office door was closed  No one else was in the room  He acknowledged consent and understanding of privacy and security of the platform   The patient has agreed to participate and understands he can discontinue the visit at any time  Patient is aware this is a billable service  Subjective    Jaci Kelsey is a 72 y o  male  The patient is seen regarding CAD with streting in 2019 to OM2 and LCx and in 4/2020 to RCA and OM  He also had Robotic CABG x2 4/2020 with  LIMA to LAD and the diagonal vessels  EF on Cath was 55%  He is now complaining of fatigue with exertion that is similar to the feelings he had prior to his intervention  He has been continuing to exercise and due his daily activities but feels like he can't wait to get home to take a nap after everything he tries to do  He has no chest pain per se  He has no exertional dyspnea  He has no palpitations syncope or near syncope, and denies edema orthopnea or PND  He does not complain of TIA or CVA symptoms  Past Medical History:   Diagnosis Date    [facility] fibrillation ([facility] [address] )     Coronary artery disease     HTN (hypertension)     Hyperlipidemia     Myocardial infarction ([facility] [address] ) 02/2019    [facility])        Past Surgical History:   Procedure Laterality Date    CARDIAC CATHETERIZATION  04/17/2020    RCA: 80% distal lesion  LCX/OM2 stent placement  LAD with 70-80% lesion at D1 bifurcation   CARDIAC CATHETERIZATION  2019    OM2 and proximal LCX stenting   CORONARY ARTERY BYPASS GRAFT      CORONARY STENT PLACEMENT  2019    OM2 and proximal LCX stenting       Current Outpatient Medications   Medication Sig Dispense Refill    albuterol (PROVENTIL HFA,VENTOLIN HFA) 90 mcg/act inhaler Inhale 2 puffs every 6 (six) hours as needed for wheezing 1 Inhaler 3    aspirin (ECOTRIN LOW STRENGTH) 81 mg EC tablet Take 81 mg by mouth daily      atorvastatin (LIPITOR) 40 mg tablet Take 1 tablet (40 mg total) by mouth daily 30 tablet 3    budesonide-formoterol (SYMBICORT) 160-4 5 mcg/act inhaler Inhale 2 puffs 2 (two) times a day Rinse mouth after use   1 Inhaler 3    losartan (COZAAR) 50 mg tablet Take 1 tablet (50 mg total) by mouth daily      metoprolol succinate (TOPROL-XL) 25 mg 24 hr tablet Take 0 5 tablets (12 5 mg total) by mouth daily 30 tablet 5    ticagrelor (Brilinta) 60 MG Take 1 tablet (60 mg total) by mouth every 12 (twelve) hours 60 tablet 5    nitroglycerin (NITROSTAT) 0 4 mg SL tablet Place 1 tablet (0 4 mg total) under the tongue every 5 (five) minutes as needed for chest pain 25 tablet 3     No current facility-administered medications for this visit  No Known Allergies    Review of Systems   Constitutional: Positive for fatigue  Negative for fever  HENT: Negative  Negative for nosebleeds  Eyes: Negative  Negative for visual disturbance  Respiratory: Negative  Negative for chest tightness, shortness of breath and wheezing  Cardiovascular: Negative  Negative for chest pain, palpitations and leg swelling  Gastrointestinal: Negative  Negative for abdominal pain, blood in stool, nausea and vomiting  Genitourinary: Negative  Negative for hematuria  Musculoskeletal: Negative  Negative for arthralgias and myalgias  Skin: Negative  Negative for pallor and rash  Neurological: Negative  Negative for dizziness, syncope, weakness and light-headedness  Hematological: Negative  Does not bruise/bleed easily  All other systems reviewed and are negative  Vitals:    02/17/21 1622   BP: 129/89   Pulse: 68         I spent 20 minutes directly with the patient during this visit    [facility] acknowledges that he has consented to an online visit or consultation  He understands that the online visit is based solely on information provided by him, and that, in the absence of a face-to-face physical evaluation by the physician, the diagnosis he receives is both limited and provisional in terms of accuracy and completeness  This is not intended to replace a full medical face-to-face evaluation by the physician   Claudia Pan understands and accepts these terms

## 2021-02-18 NOTE — ASSESSMENT & PLAN NOTE
Cath 2019: stent to OM2 and LCx  Cath 4/2020 stent to RCA and OM1   Now on Brilinta    Reduced to 90 mg daily on his own    Will go back to 60 BID     Ok to discontinue in April--a year from SHERIF  Robotic CABG 4/2020: LIMA-LAD-Diag    Possible anginal symptoms   Feeling very fatigued with exertion    Similar to feelings prior to intervention    Will schedule KOBY to be sure there is no ischemic cause

## 2021-02-19 ENCOUNTER — CLINICAL SUPPORT (OUTPATIENT)
Dept: CARDIAC REHAB | Facility: HOSPITAL | Age: 66
End: 2021-02-19
Payer: COMMERCIAL

## 2021-02-19 DIAGNOSIS — Z95.1 S/P CABG (CORONARY ARTERY BYPASS GRAFT): ICD-10-CM

## 2021-02-19 PROCEDURE — 93798 PHYS/QHP OP CAR RHAB W/ECG: CPT

## 2021-02-22 ENCOUNTER — APPOINTMENT (OUTPATIENT)
Dept: CARDIAC REHAB | Facility: HOSPITAL | Age: 66
End: 2021-02-22
Payer: COMMERCIAL

## 2021-02-22 NOTE — PROGRESS NOTES
Cardiac Rehabilitation Plan of Care   60 Day Reassessment          Today's date: 2021   # of Exercise Sessions Completed:  to 36  Due to deductible patient has agreed to attend 2X week for 24 to 36  Patient name: Igor Brink      : 1955  Age: 72 y o  MRN: [id number]  Referring Physician: Gerda Mohs, PA-C  Cardiologist: Dr Joyce Powell MD  Provider: Kerline Lucas  Clinician: ZHENG Joseph    Dx: S/P CABG X 2 2020  Hx SHERIF X2-RCA & OM1 2020  NSTEMI  Hx SHERIF 2019  HTN    Date of onset: 2020      SUMMARY OF PROGRESS:  Patient has completed 16 visits as of this date  He performs his sessions w/correct hemodynamic responses  His resting vitals were HR 66 and /68  His peak vitals were  and /60  His recovery vitals were HR 75 and /82  He performs his sessions on room air and is able to maintain his 02 saturation at 96% or greater  He rates his program a 4 on a 1-10 RPE Scale  He denies any symptoms  He is able to work consistently at a 3 95 Met Level  Kendra Bailon has been a good rehabilitation candidate due to high prior level of function, willingness to progress and support system  His program will continue to  be progressed as he is able to tolerate  He will continue to receive education specific to his disease process  Kendra Bailon has been progressing well in CR  He gives great effort during each session  He is working at a 3 95 MET Level  He has been asymptomatic  Telemetry reveals NSR w/o T Wave Inversion in the last 30 days  Medication compliance: Yes   Comments: Pt reports to be compliant with medications  Fall Risk: Low   Comments: Ambulates with a steady gait with no assist device    EKG changes: Telemetry reveals NSR w/ no T Wave Inversion in the last 30 days  History of Sinus Bradycardia; Betablocker recently decreased by half w/good results      EXERCISE ASSESSMENT and PLAN    Current Exercise Program in Rehab:       Frequency: 3 days/week Supplement with home exercise 2+ days/wk as tolerated       Minutes: 40-45       METS: 3 5 to 4           HR: 20-30 > RHR 85-95   RPE: 4-6        Modalities: Treadmill, Airdyne bike, UBE, Lifecycle, NuStep and Recumbent bike      Exercise Progression 30 Day Goals :    Frequency: 3 days/week of cardiac rehab     Supplement with home exercise 2+ days/wk as tolerated    Minutes: 45 to 50                       >150 mins/wk of moderate intensity exercise   METS: 4 to 4 5   HR: 85-95 RPE:4-6   Modalities: Treadmill, Airdyne bike, UBE, Lifecycle, NuStep and Recumbent bike    Strength trainin-3 days / week  12-15 repetitions  1-2 sets per modality    Modalities: Leg Press    Home Exercise: Patient has been perfroming a walking program, weather permitting  Goals: 10% improvement in functional capacity - based on max METs achieved in fitness assessment, Reduced dyspnea with physical activity  0-1/10, improved DASI score by 10%, Exercise 5 days/wk, >150mins/wk of moderate intensity exercise and Attend Rehab regularly    Progression Toward Goals: Will continue to educate and progress as tolerated  Education: Benefit of exercise for CAD risk factors   Plan:education on home exercise guidelines, home exercise 30+ mins 2 days opposite CR and Education class: Risk Factors for Heart Disease  Readiness to change: Action:  (Changing behavior)      NUTRITION ASSESSMENT AND PLAN    Weight control:    Starting weight: 248   Current weight:   246  Waist circumference:    Starting:NA   Current:  NA  Diabetes: N/A  Lipid management: Discussed diet and lipid management    Goals:LDL <100, HDL >40, TRG <150, CHOL <200, fasting BG , improved A1c  < 7 0% and Improved Rate Your Plate score  >40    Progression Toward Goals: Will continue to educate and progress as tolerated      Education: heart healthy eating  low sodium diet  hydration  nutrition for  lipid management  nutrition for Improved BG control  exercise and diabetes management   Plan: Education class: Reading Food Labels, Education Class: Heart Healthy Eating, replace butter with soft spreads made with olive oil, canola or yogurt, replace refined grain bread with whole grain bread, replace unhealthy snacks with fruits & vegs, eat fewer desserts and sweets, avoid processed foods, will try new grains like brown rice, quinoa, farro, drink more water and keep added daily sugar <25g/day  Readiness to change: Action:  (Changing behavior)      PSYCHOSOCIAL ASSESSMENT AND PLAN    Emotional:  Depression assessment:  PHQ-9 = 1-4 = Minimal Depression            Anxiety measure:  KATE-7 = 0-4  = Not anxious  Self-reported stress level:  5  Social support: Very Good    Goals:  Reduce perceived stress to 1-3/10, improved [facility] QOL < 27, PHQ-9 - reduced severity by one level, continue medical therapy, Physical Fitness in [facility] Score < 3, Social Support in [facility] Score < 3, Daily Activity in [facility] Score < 3, Overall Health in [facility] Score < 3, Quality of Life in [facility] Score < 3 , Change in Health in [place] Score < 3  and improved positive thoughts of well being    Progression Toward Goals: Will continue to educate and progress as tolerated      Education: signs/sxs of depression, benefits of a positive support system, stress management techniques and class:  Stress and Your Health   Plan: Class: Stress and Your Health, Class: Relaxation, Practice relaxation techniques and Exercise  Readiness to change: Action:  (Changing behavior)      OTHER CORE COMPONENTS     Tobacco:   Social History     Tobacco Use   Smoking Status Never Smoker   Smokeless Tobacco Never Used       Tobacco Use Intervention:   N/A:  Patient is a non-smoker     Anginal Symptoms:  None   NTG use: No prescription    Blood pressure:    Restin/68   Exercise: 148/60   Recovery: 130/82    Goals: consistent BP < 130/80, reduced dietary sodium <2300mg, moderate intensity exercise >150 mins/wk and medication compliance    Progression Toward Goals: Will continue to educate and progress as tolerated      Education:  understanding high blood pressure and it's relationship to CAD, low sodium diet and HTN, Education class:  Common Heart Medications and Education class: Understanding Heart Disease  Plan: Class: Understanding Heart Disease and Class: Common Heart Medications  Readiness to change: Action:  (Changing behavior)

## 2021-02-24 ENCOUNTER — CLINICAL SUPPORT (OUTPATIENT)
Dept: CARDIAC REHAB | Facility: HOSPITAL | Age: 66
End: 2021-02-24
Payer: COMMERCIAL

## 2021-02-24 DIAGNOSIS — Z95.1 S/P CABG (CORONARY ARTERY BYPASS GRAFT): ICD-10-CM

## 2021-02-24 PROCEDURE — 93798 PHYS/QHP OP CAR RHAB W/ECG: CPT

## 2021-02-26 ENCOUNTER — CLINICAL SUPPORT (OUTPATIENT)
Dept: CARDIAC REHAB | Facility: HOSPITAL | Age: 66
End: 2021-02-26
Payer: COMMERCIAL

## 2021-02-26 DIAGNOSIS — Z95.1 S/P CABG (CORONARY ARTERY BYPASS GRAFT): ICD-10-CM

## 2021-02-26 PROCEDURE — 93798 PHYS/QHP OP CAR RHAB W/ECG: CPT

## 2021-03-01 ENCOUNTER — CLINICAL SUPPORT (OUTPATIENT)
Dept: CARDIAC REHAB | Facility: HOSPITAL | Age: 66
End: 2021-03-01
Payer: COMMERCIAL

## 2021-03-01 DIAGNOSIS — Z95.1 S/P CABG (CORONARY ARTERY BYPASS GRAFT): ICD-10-CM

## 2021-03-01 PROCEDURE — 93798 PHYS/QHP OP CAR RHAB W/ECG: CPT

## 2021-03-02 ENCOUNTER — HOSPITAL ENCOUNTER (OUTPATIENT)
Dept: NON INVASIVE DIAGNOSTICS | Facility: CLINIC | Age: 66
Discharge: HOME | End: 2021-03-02
Payer: COMMERCIAL

## 2021-03-02 DIAGNOSIS — I25.10 CORONARY ARTERY DISEASE INVOLVING NATIVE CORONARY ARTERY OF NATIVE HEART WITHOUT ANGINA PECTORIS: ICD-10-CM

## 2021-03-02 PROCEDURE — 93351 STRESS TTE COMPLETE: CPT | Performed by: INTERNAL MEDICINE

## 2021-03-02 PROCEDURE — 93350 STRESS TTE ONLY: CPT

## 2021-03-03 ENCOUNTER — CLINICAL SUPPORT (OUTPATIENT)
Dept: CARDIAC REHAB | Facility: HOSPITAL | Age: 66
End: 2021-03-03
Payer: COMMERCIAL

## 2021-03-03 DIAGNOSIS — I10 ESSENTIAL HYPERTENSION: ICD-10-CM

## 2021-03-03 DIAGNOSIS — I25.10 CORONARY ARTERY DISEASE INVOLVING NATIVE CORONARY ARTERY OF NATIVE HEART WITHOUT ANGINA PECTORIS: ICD-10-CM

## 2021-03-03 DIAGNOSIS — Z95.1 S/P CABG (CORONARY ARTERY BYPASS GRAFT): ICD-10-CM

## 2021-03-03 PROCEDURE — 93798 PHYS/QHP OP CAR RHAB W/ECG: CPT

## 2021-03-03 NOTE — TELEPHONE ENCOUNTER
[facility]             He stated his meds are all messed up sent to wrong pharmacies, they gave him 90 mg of the brilinta instead of 60 mg  Yogi Avendaño would like the following meds sent through to [facility] 60 mg  BID   60 with 5 refills   Losartan 50 mg   QD   30 with 5 refills   Metoprolol 25 mg  1/2 tab QD   15 with 5 refills     Behzad Manuel

## 2021-03-04 ENCOUNTER — OFFICE VISIT (OUTPATIENT)
Dept: CARDIOLOGY CLINIC | Facility: CLINIC | Age: 66
End: 2021-03-04
Payer: COMMERCIAL

## 2021-03-04 VITALS
HEART RATE: 63 BPM | DIASTOLIC BLOOD PRESSURE: 86 MMHG | SYSTOLIC BLOOD PRESSURE: 138 MMHG | BODY MASS INDEX: 30.32 KG/M2 | WEIGHT: 249 LBS | HEIGHT: 76 IN

## 2021-03-04 DIAGNOSIS — Z95.1 S/P CABG (CORONARY ARTERY BYPASS GRAFT): ICD-10-CM

## 2021-03-04 DIAGNOSIS — I25.10 CORONARY ARTERY DISEASE INVOLVING NATIVE CORONARY ARTERY OF NATIVE HEART WITHOUT ANGINA PECTORIS: ICD-10-CM

## 2021-03-04 DIAGNOSIS — I10 ESSENTIAL HYPERTENSION: ICD-10-CM

## 2021-03-04 DIAGNOSIS — J45.909 UNCOMPLICATED ASTHMA, UNSPECIFIED ASTHMA SEVERITY, UNSPECIFIED WHETHER PERSISTENT: ICD-10-CM

## 2021-03-04 DIAGNOSIS — E78.2 MIXED HYPERLIPIDEMIA: ICD-10-CM

## 2021-03-04 PROCEDURE — 99214 OFFICE O/P EST MOD 30 MIN: CPT | Performed by: PHYSICIAN ASSISTANT

## 2021-03-04 RX ORDER — BUDESONIDE AND FORMOTEROL FUMARATE DIHYDRATE 160; 4.5 UG/1; UG/1
2 AEROSOL RESPIRATORY (INHALATION) 2 TIMES DAILY
Qty: 1 INHALER | Refills: 3 | Status: SHIPPED | OUTPATIENT
Start: 2021-03-04 | End: 2021-08-27

## 2021-03-04 RX ORDER — ATORVASTATIN CALCIUM 40 MG/1
40 TABLET, FILM COATED ORAL DAILY
Qty: 30 TABLET | Refills: 3 | Status: ON HOLD | OUTPATIENT
Start: 2021-03-04 | End: 2021-03-21 | Stop reason: CLARIF

## 2021-03-04 RX ORDER — LOSARTAN POTASSIUM 50 MG/1
50 TABLET ORAL DAILY
Qty: 30 TABLET | Refills: 5 | Status: SHIPPED | OUTPATIENT
Start: 2021-03-04 | End: 2021-03-04 | Stop reason: SDUPTHER

## 2021-03-04 RX ORDER — METOPROLOL SUCCINATE 25 MG/1
12.5 TABLET, EXTENDED RELEASE ORAL DAILY
Qty: 15 TABLET | Refills: 5 | Status: SHIPPED | OUTPATIENT
Start: 2021-03-04 | End: 2021-03-04

## 2021-03-04 RX ORDER — ATORVASTATIN CALCIUM 80 MG/1
80 TABLET, FILM COATED ORAL DAILY
Qty: 30 TABLET | Refills: 3 | Status: SHIPPED | OUTPATIENT
Start: 2021-03-04 | End: 2021-03-04

## 2021-03-04 RX ORDER — LOSARTAN POTASSIUM 50 MG/1
50 TABLET ORAL DAILY
Qty: 30 TABLET | Refills: 5 | Status: SHIPPED | OUTPATIENT
Start: 2021-03-04 | End: 2021-10-20

## 2021-03-04 NOTE — PROGRESS NOTES
[facility]   Outpatient Note  Jaci Kelsey  1955  [id number]  [facility] [address] [place] [address], [address] [place] [address] Drive, [address] Kelsey is a 72 y o  male    Assessment and Plan:   Mixed hyperlipidemia  Not taking statin regularly  Will take Atorvastatin 40 on a regular basis     Recheck lipid profile in 3 moths   Patient would like to attempt [place] diet     Coronary artery disease involving native coronary artery of native heart without angina pectoris  Cath 2019: stent to OM2 and LCx  Cath 4/2020 stent to RCA and OM1   Now on Brilinta    Reduced to 90 mg daily on his own    Will go back to 60 BID     Ok to discontinue in April--a year from SHERIF  Robotic CABG 4/2020: LIMA-LAD-Diag    Recent KOBY non-ischemic  Continues to undergo cardiac rehab  Continues to have fatigue  Will discontinue BB altogether    S/P CABG (coronary artery bypass graft)  Robotic CABG x2 4/2020: LIMA-LAD-Diag   Now with fatigue   See comments     Essential hypertension  Much better controlled           Additional Plan:   Medications as detailed above  Pertinent testing orders as outlined  Available lab results are reviewed with the patient and any additional required labs are ordered as above  Return visit will be in one month with EKG or earlier if there are problems  The patient is encouraged to call in the meantime if there are questions or concerns  Subjective: The patient is seen regarding CAD with streting in 2019 to OM2 and LCx and in 4/2020 to RCA and OM  He also had Robotic CABG x2 4/2020 with  LIMA to LAD and the diagonal vessels  EF on Cath was 55%  He is now complaining of fatigue  A recent KOBY is non-ischemic  He is on a very small dose of BB  He continues to undergo cardiac rehab   And has been continuing to exercise and due his daily activities but feels like he can't wait to get home to take a nap after everything he tries to do  He has no chest pain per se  He has no exertional dyspnea  He has no palpitations syncope or near syncope, and denies edema orthopnea or PND  He does not complain of TIA or CVA symptoms  Hypertension  Pertinent negatives include no chest pain, malaise/fatigue, orthopnea, palpitations, PND or shortness of breath  Coronary Artery Disease  Pertinent negatives include no chest pain, leg swelling, palpitations or shortness of breath  Social History  Social History     Tobacco Use   Smoking Status Never Smoker   Smokeless Tobacco Never Used   ,   Social History     Substance and Sexual Activity   Alcohol Use None   ,   Social History     Substance and Sexual Activity   Drug Use Not on file     Family History   Problem Relation Age of Onset    Heart disease Mother     Heart disease Father        Medical and Surgical History  Past Medical History:   Diagnosis Date    Atrial fibrillation ([facility] [address] )     Coronary artery disease     HTN (hypertension)     Hyperlipidemia     [facility]    [facility])      Past Surgical History:   Procedure Laterality Date    CARDIAC CATHETERIZATION  04/17/2020    RCA: 80% distal lesion  LCX/OM2 stent placement  LAD with 70-80% lesion at D1 bifurcation   CARDIAC CATHETERIZATION  2019    OM2 and proximal LCX stenting      CORONARY ARTERY BYPASS GRAFT      CORONARY STENT PLACEMENT  2019    OM2 and proximal LCX stenting         Current Outpatient Medications:     albuterol (PROVENTIL HFA,VENTOLIN HFA) 90 mcg/act inhaler, Inhale 2 puffs every 6 (six) hours as needed for wheezing, Disp: 1 Inhaler, Rfl: 3    aspirin (ECOTRIN LOW STRENGTH) 81 mg EC tablet, Take 81 mg by mouth daily, Disp: , Rfl:     atorvastatin (LIPITOR) 40 mg tablet, Take 1 tablet (40 mg total) by mouth daily, Disp: 30 tablet, Rfl: 3    budesonide-formoterol (SYMBICORT) 160-4 5 mcg/act inhaler, Inhale 2 puffs 2 (two) times a day Rinse mouth after use , Disp: 1 Inhaler, Rfl: 3    losartan (COZAAR) 50 mg tablet, Take 1 tablet (50 mg total) by mouth daily, Disp: 30 tablet, Rfl: 5    nitroglycerin (NITROSTAT) 0 4 mg SL tablet, Place 1 tablet (0 4 mg total) under the tongue every 5 (five) minutes as needed for chest pain, Disp: 25 tablet, Rfl: 3    ticagrelor (Brilinta) 60 MG, Take 1 tablet (60 mg total) by mouth every 12 (twelve) hours, Disp: 60 tablet, Rfl: 5  No Known Allergies    Review of Systems   Constitution: Negative for malaise/fatigue  HENT: Negative  Eyes: Negative  Cardiovascular: Negative  Negative for chest pain, claudication, cyanosis, dyspnea on exertion, irregular heartbeat, leg swelling, near-syncope, orthopnea, palpitations, paroxysmal nocturnal dyspnea and syncope  Respiratory: Negative  Negative for cough, hemoptysis, shortness of breath, sleep disturbances due to breathing, snoring, sputum production and wheezing  Endocrine: Negative  Hematologic/Lymphatic: Negative  Skin: Negative  Musculoskeletal: Negative  Gastrointestinal: Negative  Genitourinary: Negative  Neurological: Positive for paresthesias (left hand "A finger")  Psychiatric/Behavioral: Negative  Allergic/Immunologic: Negative  Objective:   /86   Pulse 63   Ht 6' 4" (1 93 m)   Wt 113 kg (249 lb)   BMI 30 31 kg/m²   Physical Exam   Constitutional: He is oriented to person, place, and time  He appears well-developed and well-nourished  HENT:   Head: Normocephalic and atraumatic  Mouth/Throat: Oropharynx is clear and moist    Eyes: Conjunctivae are normal  No scleral icterus  Neck: Normal range of motion  Neck supple  No JVD present  No thyromegaly present  Cardiovascular: Regular rhythm, S1 normal, S2 normal and normal heart sounds  Bradycardia present  Exam reveals no gallop and no friction rub  No murmur heard  Pulmonary/Chest: No respiratory distress  He has no wheezes  He has no rales  Abdominal: Soft   Bowel sounds are normal  He exhibits no distension  There is no abdominal tenderness  Aorta not palpable   Musculoskeletal: Normal range of motion  General: No tenderness, deformity or edema  Neurological: He is alert and oriented to person, place, and time  Skin: Skin is warm and dry  Psychiatric: He has a normal mood and affect  His behavior is normal    Nursing note and vitals reviewed        Lab Review:   No results found for: CHOL  Lab Results   Component Value Date    HDL 48 01/22/2021     Lab Results   Component Value Date    LDLCALC 133 (H) 01/22/2021     Lab Results   Component Value Date    TRIG 234 (H) 01/22/2021     Results Reviewed     None        Results Reviewed     None        Results Reviewed     None          Recent Cardiovascular Testing:   KOBY 3-2-21: No ischemia     Cath 2-: stent to OM2 and LCx    Cath 4/17/2020 stent to RCA and OM1    Robotic CABG 4/27/2020: LIMA-LAD-Diag      ECG Review:   Sinus bradycardia

## 2021-03-04 NOTE — ASSESSMENT & PLAN NOTE
Not taking statin regularly  Will take Atorvastatin 40 on a regular basis     Recheck lipid profile in 3 moths   Patient would like to attempt [employer]

## 2021-03-05 ENCOUNTER — APPOINTMENT (OUTPATIENT)
Dept: CARDIAC REHAB | Facility: HOSPITAL | Age: 66
End: 2021-03-05
Payer: COMMERCIAL

## 2021-03-05 LAB
CHEST PAIN STATEMENT: NORMAL
ECG INTERP BEFORE EX: NORMAL
MAX DIASTOLIC BP: 98 MMHG
MAX HEART RATE: 157 BPM
MAX PREDICTED HEART RATE: 155 BPM
MAX. SYSTOLIC BP: 240 MMHG
PROTOCOL NAME: NORMAL
REASON FOR TERMINATION: NORMAL
TARGET HR FORMULA: NORMAL
TEST INDICATION: NORMAL
TIME IN EXERCISE PHASE: NORMAL

## 2021-03-08 ENCOUNTER — CLINICAL SUPPORT (OUTPATIENT)
Dept: CARDIAC REHAB | Facility: HOSPITAL | Age: 66
End: 2021-03-08
Payer: COMMERCIAL

## 2021-03-08 DIAGNOSIS — Z95.1 S/P CABG (CORONARY ARTERY BYPASS GRAFT): ICD-10-CM

## 2021-03-08 PROCEDURE — 93798 PHYS/QHP OP CAR RHAB W/ECG: CPT

## 2021-03-10 ENCOUNTER — CLINICAL SUPPORT (OUTPATIENT)
Dept: CARDIAC REHAB | Facility: HOSPITAL | Age: 66
End: 2021-03-10
Payer: COMMERCIAL

## 2021-03-10 DIAGNOSIS — Z95.1 S/P CABG (CORONARY ARTERY BYPASS GRAFT): ICD-10-CM

## 2021-03-10 PROCEDURE — 93798 PHYS/QHP OP CAR RHAB W/ECG: CPT

## 2021-03-12 ENCOUNTER — CLINICAL SUPPORT (OUTPATIENT)
Dept: CARDIAC REHAB | Facility: HOSPITAL | Age: 66
End: 2021-03-12
Payer: COMMERCIAL

## 2021-03-12 ENCOUNTER — APPOINTMENT (OUTPATIENT)
Dept: LAB | Facility: HOSPITAL | Age: 66
End: 2021-03-12
Payer: COMMERCIAL

## 2021-03-12 DIAGNOSIS — Z95.1 S/P CABG (CORONARY ARTERY BYPASS GRAFT): ICD-10-CM

## 2021-03-12 DIAGNOSIS — E78.2 MIXED HYPERLIPIDEMIA: ICD-10-CM

## 2021-03-12 LAB
CHOLEST SERPL-MCNC: 245 MG/DL (ref 0–200)
HDLC SERPL-MCNC: 39 MG/DL
LDLC SERPL CALC-MCNC: 136 MG/DL (ref 0–100)
TRIGL SERPL-MCNC: 351 MG/DL (ref 44–166)

## 2021-03-12 PROCEDURE — 80061 LIPID PANEL: CPT

## 2021-03-12 PROCEDURE — 36415 COLL VENOUS BLD VENIPUNCTURE: CPT

## 2021-03-12 PROCEDURE — 93798 PHYS/QHP OP CAR RHAB W/ECG: CPT

## 2021-03-15 ENCOUNTER — CLINICAL SUPPORT (OUTPATIENT)
Dept: CARDIAC REHAB | Facility: HOSPITAL | Age: 66
End: 2021-03-15
Payer: COMMERCIAL

## 2021-03-15 DIAGNOSIS — Z95.1 S/P CABG (CORONARY ARTERY BYPASS GRAFT): ICD-10-CM

## 2021-03-15 PROCEDURE — 93798 PHYS/QHP OP CAR RHAB W/ECG: CPT

## 2021-03-16 DIAGNOSIS — E78.2 MIXED HYPERLIPIDEMIA: Primary | ICD-10-CM

## 2021-03-16 NOTE — PROGRESS NOTES
After talking more to the patient, he is saying he is not going to take 80mg  Said he barely was even on the 40mg when he took the labs so he will take 40 every day and then have it rechecked  He said he is very sensitive to statins and not willing to try 80 until he tries 40mg every day  Has appt in [place]  Sebastian Zavaleta he will see you then and then we can give him a script for labs then

## 2021-03-17 ENCOUNTER — CLINICAL SUPPORT (OUTPATIENT)
Dept: CARDIAC REHAB | Facility: HOSPITAL | Age: 66
End: 2021-03-17
Payer: COMMERCIAL

## 2021-03-17 DIAGNOSIS — Z95.1 S/P CABG (CORONARY ARTERY BYPASS GRAFT): ICD-10-CM

## 2021-03-17 PROCEDURE — 93798 PHYS/QHP OP CAR RHAB W/ECG: CPT

## 2021-03-19 ENCOUNTER — APPOINTMENT (OUTPATIENT)
Dept: CARDIAC REHAB | Facility: HOSPITAL | Age: 66
End: 2021-03-19
Payer: COMMERCIAL

## 2021-03-19 ENCOUNTER — CLINICAL SUPPORT (OUTPATIENT)
Dept: CARDIAC REHAB | Facility: HOSPITAL | Age: 66
End: 2021-03-19
Payer: COMMERCIAL

## 2021-03-19 DIAGNOSIS — Z95.1 S/P CABG (CORONARY ARTERY BYPASS GRAFT): ICD-10-CM

## 2021-03-19 PROCEDURE — 93798 PHYS/QHP OP CAR RHAB W/ECG: CPT

## 2021-03-21 ENCOUNTER — HOSPITAL ENCOUNTER (OUTPATIENT)
Facility: HOSPITAL | Age: 66
Setting detail: OBSERVATION
Discharge: HOME | End: 2021-03-22
Attending: EMERGENCY MEDICINE | Admitting: HOSPITALIST
Payer: COMMERCIAL

## 2021-03-21 ENCOUNTER — APPOINTMENT (EMERGENCY)
Dept: RADIOLOGY | Facility: HOSPITAL | Age: 66
End: 2021-03-21
Payer: COMMERCIAL

## 2021-03-21 DIAGNOSIS — Z95.1 S/P CABG (CORONARY ARTERY BYPASS GRAFT): ICD-10-CM

## 2021-03-21 DIAGNOSIS — R07.9 CHEST PAIN: Primary | ICD-10-CM

## 2021-03-21 DIAGNOSIS — I10 ESSENTIAL HYPERTENSION: Chronic | ICD-10-CM

## 2021-03-21 PROBLEM — I25.10 CORONARY ARTERY DISEASE INVOLVING NATIVE CORONARY ARTERY OF NATIVE HEART WITHOUT ANGINA PECTORIS: Chronic | Status: ACTIVE | Noted: 2020-12-10

## 2021-03-21 PROBLEM — D72.829 LEUKOCYTOSIS: Status: ACTIVE | Noted: 2021-03-21

## 2021-03-21 PROBLEM — E78.2 MIXED HYPERLIPIDEMIA: Chronic | Status: ACTIVE | Noted: 2020-12-10

## 2021-03-21 LAB
ALBUMIN SERPL BCP-MCNC: 4.4 G/DL (ref 3.5–5.7)
ALP SERPL-CCNC: 62 U/L (ref 55–165)
ALT SERPL W P-5'-P-CCNC: 15 U/L (ref 7–52)
ANION GAP SERPL CALCULATED.3IONS-SCNC: 10 MMOL/L (ref 4–13)
APTT PPP: 30 SECONDS (ref 23–37)
AST SERPL W P-5'-P-CCNC: 15 U/L (ref 13–39)
BASOPHILS # BLD AUTO: 0.1 THOUSANDS/ΜL (ref 0–0.1)
BASOPHILS NFR BLD AUTO: 1 % (ref 0–2)
BILIRUB SERPL-MCNC: 1.3 MG/DL (ref 0.2–1)
BNP SERPL-MCNC: 43 PG/ML (ref 1–100)
BUN SERPL-MCNC: 14 MG/DL (ref 7–25)
CALCIUM SERPL-MCNC: 9.4 MG/DL (ref 8.6–10.5)
CHLORIDE SERPL-SCNC: 98 MMOL/L (ref 98–107)
CO2 SERPL-SCNC: 29 MMOL/L (ref 21–31)
CREAT SERPL-MCNC: 1.09 MG/DL (ref 0.7–1.3)
EOSINOPHIL # BLD AUTO: 0.1 THOUSAND/ΜL (ref 0–0.61)
EOSINOPHIL NFR BLD AUTO: 0 % (ref 0–5)
ERYTHROCYTE [DISTWIDTH] IN BLOOD BY AUTOMATED COUNT: 14.1 % (ref 11.5–14.5)
FLUAV RNA RESP QL NAA+PROBE: NEGATIVE
FLUBV RNA RESP QL NAA+PROBE: NEGATIVE
GFR SERPL CREATININE-BSD FRML MDRD: 71 ML/MIN/1.73SQ M
GLUCOSE SERPL-MCNC: 152 MG/DL (ref 65–140)
GLUCOSE SERPL-MCNC: 173 MG/DL (ref 65–99)
HCT VFR BLD AUTO: 47.2 % (ref 42–47)
HGB BLD-MCNC: 15.8 G/DL (ref 14–18)
INR PPP: 0.96 (ref 0.84–1.19)
LYMPHOCYTES # BLD AUTO: 1.1 THOUSANDS/ΜL (ref 0.6–4.47)
LYMPHOCYTES NFR BLD AUTO: 9 % (ref 21–51)
MCH RBC QN AUTO: 29.6 PG (ref 26–34)
MCHC RBC AUTO-ENTMCNC: 33.4 G/DL (ref 31–37)
MCV RBC AUTO: 89 FL (ref 81–99)
MONOCYTES # BLD AUTO: 0.9 THOUSAND/ΜL (ref 0.17–1.22)
MONOCYTES NFR BLD AUTO: 7 % (ref 2–12)
NEUTROPHILS # BLD AUTO: 10.2 THOUSANDS/ΜL (ref 1.4–6.5)
NEUTS SEG NFR BLD AUTO: 83 % (ref 42–75)
PLATELET # BLD AUTO: 215 THOUSANDS/UL (ref 149–390)
PMV BLD AUTO: 7.4 FL (ref 8.6–11.7)
POTASSIUM SERPL-SCNC: 3.5 MMOL/L (ref 3.5–5.5)
PROT SERPL-MCNC: 7.1 G/DL (ref 6.4–8.9)
PROTHROMBIN TIME: 12.7 SECONDS (ref 11.6–14.5)
RBC # BLD AUTO: 5.32 MILLION/UL (ref 4.3–5.9)
RSV RNA RESP QL NAA+PROBE: NEGATIVE
SARS-COV-2 RNA RESP QL NAA+PROBE: NEGATIVE
SODIUM SERPL-SCNC: 137 MMOL/L (ref 134–143)
TROPONIN I SERPL-MCNC: <0.03 NG/ML
WBC # BLD AUTO: 12.4 THOUSAND/UL (ref 4.8–10.8)

## 2021-03-21 PROCEDURE — 85025 COMPLETE CBC W/AUTO DIFF WBC: CPT | Performed by: EMERGENCY MEDICINE

## 2021-03-21 PROCEDURE — 99285 EMERGENCY DEPT VISIT HI MDM: CPT | Performed by: EMERGENCY MEDICINE

## 2021-03-21 PROCEDURE — 84484 ASSAY OF TROPONIN QUANT: CPT | Performed by: EMERGENCY MEDICINE

## 2021-03-21 PROCEDURE — 0241U HB NFCT DS VIR RESP RNA 4 TRGT: CPT | Performed by: EMERGENCY MEDICINE

## 2021-03-21 PROCEDURE — 85610 PROTHROMBIN TIME: CPT | Performed by: EMERGENCY MEDICINE

## 2021-03-21 PROCEDURE — 80053 COMPREHEN METABOLIC PANEL: CPT | Performed by: EMERGENCY MEDICINE

## 2021-03-21 PROCEDURE — 36415 COLL VENOUS BLD VENIPUNCTURE: CPT | Performed by: EMERGENCY MEDICINE

## 2021-03-21 PROCEDURE — 85730 THROMBOPLASTIN TIME PARTIAL: CPT | Performed by: EMERGENCY MEDICINE

## 2021-03-21 PROCEDURE — 93005 ELECTROCARDIOGRAM TRACING: CPT

## 2021-03-21 PROCEDURE — 71045 X-RAY EXAM CHEST 1 VIEW: CPT

## 2021-03-21 PROCEDURE — 99285 EMERGENCY DEPT VISIT HI MDM: CPT

## 2021-03-21 PROCEDURE — 82948 REAGENT STRIP/BLOOD GLUCOSE: CPT

## 2021-03-21 PROCEDURE — 83880 ASSAY OF NATRIURETIC PEPTIDE: CPT | Performed by: EMERGENCY MEDICINE

## 2021-03-21 PROCEDURE — 99220 PR INITIAL OBSERVATION CARE/DAY 70 MINUTES: CPT | Performed by: PHYSICIAN ASSISTANT

## 2021-03-21 RX ORDER — EZETIMIBE 10 MG/1
10 TABLET ORAL DAILY
Status: DISCONTINUED | OUTPATIENT
Start: 2021-03-22 | End: 2021-03-22

## 2021-03-21 RX ORDER — ONDANSETRON 2 MG/ML
4 INJECTION INTRAMUSCULAR; INTRAVENOUS EVERY 6 HOURS PRN
Status: DISCONTINUED | OUTPATIENT
Start: 2021-03-21 | End: 2021-03-22 | Stop reason: HOSPADM

## 2021-03-21 RX ORDER — LOSARTAN POTASSIUM 50 MG/1
50 TABLET ORAL DAILY
Status: DISCONTINUED | OUTPATIENT
Start: 2021-03-22 | End: 2021-03-22

## 2021-03-21 RX ORDER — NITROGLYCERIN 0.4 MG/1
0.4 TABLET SUBLINGUAL ONCE
Status: COMPLETED | OUTPATIENT
Start: 2021-03-21 | End: 2021-03-21

## 2021-03-21 RX ORDER — ASPIRIN 81 MG/1
324 TABLET, CHEWABLE ORAL ONCE
Status: COMPLETED | OUTPATIENT
Start: 2021-03-21 | End: 2021-03-21

## 2021-03-21 RX ORDER — EZETIMIBE 10 MG/1
10 TABLET ORAL DAILY
COMMUNITY
End: 2022-07-28 | Stop reason: SDUPTHER

## 2021-03-21 RX ORDER — ACETAMINOPHEN 325 MG/1
650 TABLET ORAL EVERY 4 HOURS PRN
Status: DISCONTINUED | OUTPATIENT
Start: 2021-03-21 | End: 2021-03-22 | Stop reason: HOSPADM

## 2021-03-21 RX ORDER — NITROGLYCERIN 0.4 MG/1
0.4 TABLET SUBLINGUAL
Status: DISCONTINUED | OUTPATIENT
Start: 2021-03-21 | End: 2021-03-22 | Stop reason: HOSPADM

## 2021-03-21 RX ORDER — ALBUTEROL SULFATE 90 UG/1
2 AEROSOL, METERED RESPIRATORY (INHALATION) EVERY 6 HOURS PRN
Status: DISCONTINUED | OUTPATIENT
Start: 2021-03-21 | End: 2021-03-22 | Stop reason: HOSPADM

## 2021-03-21 RX ORDER — ASPIRIN 81 MG/1
81 TABLET ORAL DAILY
Status: DISCONTINUED | OUTPATIENT
Start: 2021-03-22 | End: 2021-03-22

## 2021-03-21 RX ORDER — BUDESONIDE AND FORMOTEROL FUMARATE DIHYDRATE 160; 4.5 UG/1; UG/1
2 AEROSOL RESPIRATORY (INHALATION) 2 TIMES DAILY
Status: DISCONTINUED | OUTPATIENT
Start: 2021-03-22 | End: 2021-03-22 | Stop reason: HOSPADM

## 2021-03-21 RX ADMIN — NITROGLYCERIN 0.4 MG: 0.4 TABLET SUBLINGUAL at 21:57

## 2021-03-21 RX ADMIN — ASPIRIN 81 MG CHEWABLE TABLET 324 MG: 81 TABLET CHEWABLE at 21:57

## 2021-03-22 ENCOUNTER — APPOINTMENT (OUTPATIENT)
Dept: CARDIAC REHAB | Facility: HOSPITAL | Age: 66
End: 2021-03-22
Payer: COMMERCIAL

## 2021-03-22 VITALS
TEMPERATURE: 97.1 F | HEART RATE: 89 BPM | OXYGEN SATURATION: 96 % | BODY MASS INDEX: 29.96 KG/M2 | WEIGHT: 246.03 LBS | RESPIRATION RATE: 16 BRPM | HEIGHT: 76 IN | DIASTOLIC BLOOD PRESSURE: 80 MMHG | SYSTOLIC BLOOD PRESSURE: 158 MMHG

## 2021-03-22 LAB
ANION GAP SERPL CALCULATED.3IONS-SCNC: 9 MMOL/L (ref 4–13)
ATRIAL RATE: 65 BPM
ATRIAL RATE: 75 BPM
ATRIAL RATE: 93 BPM
BILIRUB UR QL STRIP: NEGATIVE
BUN SERPL-MCNC: 15 MG/DL (ref 7–25)
CALCIUM SERPL-MCNC: 9 MG/DL (ref 8.6–10.5)
CHLORIDE SERPL-SCNC: 102 MMOL/L (ref 98–107)
CHOLEST SERPL-MCNC: 166 MG/DL (ref 0–200)
CLARITY UR: CLEAR
CO2 SERPL-SCNC: 28 MMOL/L (ref 21–31)
COLOR UR: YELLOW
CREAT SERPL-MCNC: 1.01 MG/DL (ref 0.7–1.3)
D DIMER PPP FEU-MCNC: 0.42 UG/ML FEU
EST. AVERAGE GLUCOSE BLD GHB EST-MCNC: 128 MG/DL
GFR SERPL CREATININE-BSD FRML MDRD: 78 ML/MIN/1.73SQ M
GLUCOSE SERPL-MCNC: 133 MG/DL (ref 65–99)
GLUCOSE UR STRIP-MCNC: ABNORMAL MG/DL
HBA1C MFR BLD: 6.1 %
HDLC SERPL-MCNC: 41 MG/DL
HGB UR QL STRIP.AUTO: NEGATIVE
KETONES UR STRIP-MCNC: NEGATIVE MG/DL
LDLC SERPL CALC-MCNC: 92 MG/DL (ref 0–100)
LEUKOCYTE ESTERASE UR QL STRIP: NEGATIVE
MAGNESIUM SERPL-MCNC: 2.3 MG/DL (ref 1.9–2.7)
NITRITE UR QL STRIP: NEGATIVE
NONHDLC SERPL-MCNC: 125 MG/DL
P AXIS: 58 DEGREES
P AXIS: 66 DEGREES
P AXIS: 75 DEGREES
PH UR STRIP.AUTO: 6 [PH]
POTASSIUM SERPL-SCNC: 3.4 MMOL/L (ref 3.5–5.5)
PR INTERVAL: 158 MS
PR INTERVAL: 160 MS
PR INTERVAL: 174 MS
PROCALCITONIN SERPL-MCNC: <0.05 NG/ML
PROT UR STRIP-MCNC: NEGATIVE MG/DL
QRS AXIS: -8 DEGREES
QRS AXIS: 145 DEGREES
QRS AXIS: 34 DEGREES
QRSD INTERVAL: 100 MS
QRSD INTERVAL: 104 MS
QRSD INTERVAL: 104 MS
QT INTERVAL: 358 MS
QT INTERVAL: 388 MS
QT INTERVAL: 398 MS
QTC INTERVAL: 413 MS
QTC INTERVAL: 433 MS
QTC INTERVAL: 445 MS
SODIUM SERPL-SCNC: 139 MMOL/L (ref 134–143)
SP GR UR STRIP.AUTO: 1.02 (ref 1–1.03)
T WAVE AXIS: 32 DEGREES
T WAVE AXIS: 37 DEGREES
T WAVE AXIS: 63 DEGREES
TRIGL SERPL-MCNC: 166 MG/DL (ref 44–166)
TROPONIN I SERPL-MCNC: <0.03 NG/ML
TROPONIN I SERPL-MCNC: <0.03 NG/ML
UROBILINOGEN UR QL STRIP.AUTO: 0.2 E.U./DL
VENTRICULAR RATE: 65 BPM
VENTRICULAR RATE: 75 BPM
VENTRICULAR RATE: 93 BPM

## 2021-03-22 PROCEDURE — 83036 HEMOGLOBIN GLYCOSYLATED A1C: CPT | Performed by: PHYSICIAN ASSISTANT

## 2021-03-22 PROCEDURE — 99217 PR OBSERVATION CARE DISCHARGE MANAGEMENT: CPT | Performed by: INTERNAL MEDICINE

## 2021-03-22 PROCEDURE — 93010 ELECTROCARDIOGRAM REPORT: CPT | Performed by: INTERNAL MEDICINE

## 2021-03-22 PROCEDURE — 83735 ASSAY OF MAGNESIUM: CPT | Performed by: PHYSICIAN ASSISTANT

## 2021-03-22 PROCEDURE — 99214 OFFICE O/P EST MOD 30 MIN: CPT | Performed by: INTERNAL MEDICINE

## 2021-03-22 PROCEDURE — 80048 BASIC METABOLIC PNL TOTAL CA: CPT | Performed by: PHYSICIAN ASSISTANT

## 2021-03-22 PROCEDURE — 81003 URINALYSIS AUTO W/O SCOPE: CPT | Performed by: EMERGENCY MEDICINE

## 2021-03-22 PROCEDURE — 84484 ASSAY OF TROPONIN QUANT: CPT | Performed by: PHYSICIAN ASSISTANT

## 2021-03-22 PROCEDURE — 85379 FIBRIN DEGRADATION QUANT: CPT | Performed by: INTERNAL MEDICINE

## 2021-03-22 PROCEDURE — 84145 PROCALCITONIN (PCT): CPT | Performed by: PHYSICIAN ASSISTANT

## 2021-03-22 PROCEDURE — 80061 LIPID PANEL: CPT | Performed by: PHYSICIAN ASSISTANT

## 2021-03-22 PROCEDURE — 93005 ELECTROCARDIOGRAM TRACING: CPT

## 2021-03-22 RX ORDER — EZETIMIBE 10 MG/1
10 TABLET ORAL
Status: DISCONTINUED | OUTPATIENT
Start: 2021-03-22 | End: 2021-03-22 | Stop reason: HOSPADM

## 2021-03-22 RX ORDER — POTASSIUM CHLORIDE 20 MEQ/1
40 TABLET, EXTENDED RELEASE ORAL ONCE
Status: COMPLETED | OUTPATIENT
Start: 2021-03-22 | End: 2021-03-22

## 2021-03-22 RX ORDER — ASPIRIN 81 MG/1
81 TABLET ORAL
Status: DISCONTINUED | OUTPATIENT
Start: 2021-03-22 | End: 2021-03-22 | Stop reason: HOSPADM

## 2021-03-22 RX ORDER — LOSARTAN POTASSIUM 50 MG/1
50 TABLET ORAL
Status: DISCONTINUED | OUTPATIENT
Start: 2021-03-22 | End: 2021-03-22 | Stop reason: HOSPADM

## 2021-03-22 RX ORDER — LOSARTAN POTASSIUM 50 MG/1
50 TABLET ORAL
Status: DISCONTINUED | OUTPATIENT
Start: 2021-03-22 | End: 2021-03-22

## 2021-03-22 RX ORDER — METOPROLOL TARTRATE 50 MG/1
50 TABLET, FILM COATED ORAL 2 TIMES DAILY
Status: DISCONTINUED | OUTPATIENT
Start: 2021-03-22 | End: 2021-03-22

## 2021-03-22 RX ADMIN — EZETIMIBE 10 MG: 10 TABLET ORAL at 00:46

## 2021-03-22 RX ADMIN — POTASSIUM CHLORIDE 40 MEQ: 1500 TABLET, EXTENDED RELEASE ORAL at 12:58

## 2021-03-22 RX ADMIN — BUDESONIDE AND FORMOTEROL FUMARATE DIHYDRATE 2 PUFF: 160; 4.5 AEROSOL RESPIRATORY (INHALATION) at 08:50

## 2021-03-22 RX ADMIN — LOSARTAN POTASSIUM 50 MG: 50 TABLET, FILM COATED ORAL at 00:46

## 2021-03-22 RX ADMIN — LOSARTAN POTASSIUM 50 MG: 50 TABLET, FILM COATED ORAL at 10:17

## 2021-03-22 RX ADMIN — Medication 12.5 MG: at 14:49

## 2021-03-22 RX ADMIN — ENOXAPARIN SODIUM 40 MG: 40 INJECTION SUBCUTANEOUS at 08:50

## 2021-03-22 NOTE — NURSING NOTE
Pt discharges to home  IV catheter removed intact, pressure and DSD applied  Discharge instructions reviewed with patient, patient verbalized understanding, all questions were answered  All belongings and medications were returned to patient  Patient walked off unit with RN

## 2021-03-22 NOTE — ED PROVIDER NOTES
History  Chief Complaint   Patient presents with    Chest Pain     chest pain shortness of breath since 9 am     With cp that started this AM, left sided and tightness  Mild sob as if he can not get a deep breath in  No uri sx reported  Hx of MI, this feels similar  States he felt run down and tired all day like last  MI  No legs welling  Did lifts weight (bench) at cardiac rehab,  He questions if this lead to his sx  reported he was dipahoretic  Prior to Admission Medications   Prescriptions Last Dose Informant Patient Reported? Taking?    albuterol (PROVENTIL HFA,VENTOLIN HFA) 90 mcg/act inhaler Past Month at Unknown time  No Yes   Sig: Inhale 2 puffs every 6 (six) hours as needed for wheezing   aspirin (ECOTRIN LOW STRENGTH) 81 mg EC tablet 3/21/2021 at Unknown time  Yes Yes   Sig: Take 81 mg by mouth daily   budesonide-formoterol (SYMBICORT) 160-4 5 mcg/act inhaler 3/21/2021 at Unknown time  No Yes   Sig: Inhale 2 puffs 2 (two) times a day Rinse mouth after use    ezetimibe (ZETIA) 10 mg tablet 3/21/2021 at Unknown time Self Yes Yes   Sig: Take 10 mg by mouth daily Take one tablet by mouth once daily at night    losartan (COZAAR) 50 mg tablet 3/21/2021 at Unknown time  No Yes   Sig: Take 1 tablet (50 mg total) by mouth daily   nitroglycerin (NITROSTAT) 0 4 mg SL tablet 3/21/2021 at Unknown time  No Yes   Sig: Place 1 tablet (0 4 mg total) under the tongue every 5 (five) minutes as needed for chest pain   ticagrelor (Brilinta) 60 MG 3/21/2021 at Unknown time  No Yes   Sig: Take 1 tablet (60 mg total) by mouth every 12 (twelve) hours      Facility-Administered Medications: None       Past Medical History:   Diagnosis Date    Asthma     Atrial fibrillation (HCC)     Coronary artery disease     HTN (hypertension)     Hyperlipidemia     Myocardial infarction ([facility] [address] ) 02/2019    [facility]     TIA (transient ischemic attack)        Past Surgical History:   Procedure Laterality Date    APPENDECTOMY  CARDIAC CATHETERIZATION  04/17/2020    RCA: 80% distal lesion  LCX/OM2 stent placement  LAD with 70-80% lesion at D1 bifurcation   CARDIAC CATHETERIZATION  2019    OM2 and proximal LCX stenting   CATARACT EXTRACTION Right     CORONARY ARTERY BYPASS GRAFT      CORONARY STENT PLACEMENT  2019    OM2 and proximal LCX stenting       Family History   Problem Relation Age of Onset    Heart disease Mother     Kidney disease Mother     Heart disease Father      I have reviewed and agree with the history as documented  E-Cigarette/Vaping    E-Cigarette Use Never User      E-Cigarette/Vaping Substances    Nicotine No     THC No     CBD No     Flavoring No     Other No     Unknown No      Social History     Tobacco Use    Smoking status: Never Smoker    Smokeless tobacco: Never Used   Substance Use Topics    Alcohol use: Never     Frequency: Never    Drug use: Never       Review of Systems   All other systems reviewed and are negative  Physical Exam  Physical Exam  Constitutional:       General: He is not in acute distress  Appearance: He is diaphoretic  He is not toxic-appearing  HENT:      Head: Normocephalic and atraumatic  Right Ear: External ear normal       Left Ear: External ear normal       Nose: Nose normal       Mouth/Throat:      Mouth: Mucous membranes are moist       Pharynx: No oropharyngeal exudate  Eyes:      General:         Right eye: No discharge  Left eye: No discharge  Conjunctiva/sclera: Conjunctivae normal       Pupils: Pupils are equal, round, and reactive to light  Neck:      Musculoskeletal: Normal range of motion and neck supple  Vascular: No JVD  Trachea: No tracheal deviation  Cardiovascular:      Rate and Rhythm: Normal rate and regular rhythm  Pulses: Normal pulses  Heart sounds: Normal heart sounds  No murmur  No friction rub  No gallop      Pulmonary:      Effort: Pulmonary effort is normal  No respiratory distress  Breath sounds: Normal breath sounds  No stridor  No wheezing, rhonchi or rales  Chest:      Chest wall: No tenderness  Abdominal:      General: Abdomen is flat  Bowel sounds are normal  There is no distension  Palpations: Abdomen is soft  There is no mass  Tenderness: There is no abdominal tenderness  There is no guarding or rebound  Hernia: No hernia is present  Musculoskeletal: Normal range of motion  General: No swelling, tenderness, deformity or signs of injury  Right lower leg: No edema  Left lower leg: No edema  Skin:     General: Skin is warm  Capillary Refill: Capillary refill takes less than 2 seconds  Coloration: Skin is not pale  Findings: No rash  Neurological:      General: No focal deficit present  Mental Status: He is alert and oriented to person, place, and time  Mental status is at baseline  Cranial Nerves: No cranial nerve deficit  Sensory: No sensory deficit  Motor: No weakness or abnormal muscle tone        Gait: Gait normal       Deep Tendon Reflexes: Reflexes normal    Psychiatric:         Mood and Affect: Mood normal          Vital Signs  ED Triage Vitals   Temperature Pulse Respirations Blood Pressure SpO2   03/21/21 2132 03/21/21 2132 03/21/21 2132 03/21/21 2137 03/21/21 2132   100 °F (37 8 °C) 99 (!) 29 (!) 172/80 96 %      Temp Source Heart Rate Source Patient Position - Orthostatic VS BP Location FiO2 (%)   03/21/21 2320 03/21/21 2200 03/21/21 2230 03/21/21 2200 --   Temporal Monitor Sitting Right arm       Pain Score       03/21/21 2230       2           Vitals:    03/22/21 1100 03/22/21 1242 03/22/21 1446 03/22/21 1500   BP: (!) 183/85 162/83 152/80 158/80   Pulse: 82 100 84 89   Patient Position - Orthostatic VS: Lying  Lying          Visual Acuity      ED Medications  Medications   nitroglycerin (NITROSTAT) SL tablet 0 4 mg (0 4 mg Sublingual Given 3/21/21 2157)   aspirin chewable tablet 324 mg (324 mg Oral Given 3/21/21 2157)   potassium chloride (K-DUR,KLOR-CON) CR tablet 40 mEq (40 mEq Oral Given 3/22/21 1258)   metoprolol tartrate (LOPRESSOR) partial tablet 12 5 mg (12 5 mg Oral Given 3/22/21 1449)       Diagnostic Studies  Results Reviewed     Procedure Component Value Units Date/Time    Procalcitonin with AM Reflex [[id number]]  (Normal) Collected: 03/22/21 0040    Lab Status: Final result Specimen: Blood from Arm, Left Updated: 03/22/21 1347     Procalcitonin <0 05 ng/ml     Hemoglobin A1C [[id number]]  (Abnormal) Collected: 03/22/21 0445    Lab Status: Final result Specimen: Blood from Arm, Left Updated: 03/22/21 1344     Hemoglobin A1C 6 1 %       mg/dl     Basic metabolic panel [[id number]]  (Abnormal) Collected: 03/22/21 0445    Lab Status: Final result Specimen: Blood from Arm, Left Updated: 03/22/21 0743     Sodium 139 mmol/L      Potassium 3 4 mmol/L      Chloride 102 mmol/L      CO2 28 mmol/L      ANION GAP 9 mmol/L      BUN 15 mg/dL      Creatinine 1 01 mg/dL      Glucose 133 mg/dL      Calcium 9 0 mg/dL      eGFR 78 ml/min/1 73sq m     Narrative:      [place] guidelines for Chronic Kidney Disease (CKD):     Stage 1 with normal or high GFR (GFR > 90 mL/min/1 73 square meters)    Stage 2 Mild CKD (GFR = 60-89 mL/min/1 73 square meters)    Stage 3A Moderate CKD (GFR = 45-59 mL/min/1 73 square meters)    Stage 3B Moderate CKD (GFR = 30-44 mL/min/1 73 square meters)    Stage 4 Severe CKD (GFR = 15-29 mL/min/1 73 square meters)    Stage 5 End Stage CKD (GFR <15 mL/min/1 73 square meters)  Note: GFR calculation is accurate only with a steady state creatinine    Magnesium [[id number]]  (Normal) Collected: 03/22/21 0445    Lab Status: Final result Specimen: Blood from Arm, Left Updated: 03/22/21 0742     Magnesium 2 3 mg/dL     Lipid panel [[id number]] Collected: 03/22/21 0445    Lab Status: Final result Specimen: Blood from Arm, Left Updated: 03/22/21 9113 Cholesterol 166 mg/dL      Triglycerides 166 mg/dL      HDL, Direct 41 mg/dL      LDL Calculated 92 mg/dL      Non-HDL-Chol (CHOL-HDL) 125 mg/dl     UA w Reflex to Microscopic w Reflex to Culture [[id number]]  (Abnormal) Collected: 03/22/21 0519    Lab Status: Final result Specimen: Urine, Clean Catch Updated: 03/22/21 0656     Color, UA Yellow     Clarity, UA Clear     Specific Gravity, UA 1 025     pH, UA 6 0     Leukocytes, UA Negative     Nitrite, UA Negative     Protein, UA Negative mg/dl      Glucose, UA Trace mg/dl      Ketones, UA Negative mg/dl      Urobilinogen, UA 0 2 E U /dl      Bilirubin, UA Negative     Blood, UA Negative    Troponin I [[id number]]  (Normal) Collected: 03/22/21 0347    Lab Status: Final result Specimen: Blood from Arm, Left Updated: 03/22/21 0422     Troponin I <0 03 ng/mL     Troponin I [[id number]]  (Normal) Collected: 03/22/21 0039    Lab Status: Final result Specimen: Blood from Arm, Left Updated: 03/22/21 0113     Troponin I <0 03 ng/mL     Fingerstick Glucose (POCT) [[id number]]  (Abnormal) Collected: 03/21/21 2143    Lab Status: Final result Updated: 03/21/21 2320     POC Glucose 152 mg/dl     COVID19, Influenza A/B, RSV PCR[facility] [[id number]]  (Normal) Collected: 03/21/21 2138    Lab Status: Final result Specimen: Nares from Nose Updated: 03/21/21 2236     SARS-CoV-2 Negative     INFLUENZA A PCR Negative     INFLUENZA B PCR Negative     RSV PCR Negative    Narrative: This test has been authorized by FDA under an EUA (Emergency Use Assay) for use by authorized laboratories  Clinical caution and judgement should be used with the interpretation of these results with consideration of the clinical impression and other laboratory testing  Testing reported as "Positive" or "Negative" has been proven to be accurate according to standard laboratory validation requirements  All testing is performed with control materials showing appropriate reactivity at standard intervals  B-Type Natriuretic Peptide ([address]) [[id number]]  (Normal) Collected: 03/21/21 2138    Lab Status: Final result Specimen: Blood from Arm, Left Updated: 03/21/21 2217     BNP 43 pg/mL     Comprehensive metabolic panel [[id number]]  (Abnormal) Collected: 03/21/21 2138    Lab Status: Final result Specimen: Blood from Arm, Left Updated: 03/21/21 2206     Sodium 137 mmol/L      Potassium 3 5 mmol/L      Chloride 98 mmol/L      CO2 29 mmol/L      ANION GAP 10 mmol/L      BUN 14 mg/dL      Creatinine 1 09 mg/dL      Glucose 173 mg/dL      Calcium 9 4 mg/dL      AST 15 U/L      ALT 15 U/L      Alkaline Phosphatase 62 U/L      Total Protein 7 1 g/dL      Albumin 4 4 g/dL      Total Bilirubin 1 30 mg/dL      eGFR 71 ml/min/1 73sq m     Narrative:      [place] guidelines for Chronic Kidney Disease (CKD):     Stage 1 with normal or high GFR (GFR > 90 mL/min/1 73 square meters)    Stage 2 Mild CKD (GFR = 60-89 mL/min/1 73 square meters)    Stage 3A Moderate CKD (GFR = 45-59 mL/min/1 73 square meters)    Stage 3B Moderate CKD (GFR = 30-44 mL/min/1 73 square meters)    Stage 4 Severe CKD (GFR = 15-29 mL/min/1 73 square meters)    Stage 5 End Stage CKD (GFR <15 mL/min/1 73 square meters)  Note: GFR calculation is accurate only with a steady state creatinine    Troponin I [[id number]]  (Normal) Collected: 03/21/21 2138    Lab Status: Final result Specimen: Blood from Arm, Left Updated: 03/21/21 2206     Troponin I <0 03 ng/mL     Protime-INR [[id number]]  (Normal) Collected: 03/21/21 2139    Lab Status: Final result Specimen: Blood from Arm, Left Updated: 03/21/21 2156     Protime 12 7 seconds      INR 0 96    APTT [[id number]]  (Normal) Collected: 03/21/21 2139    Lab Status: Final result Specimen: Blood from Arm, Left Updated: 03/21/21 2156     PTT 30 seconds     CBC and differential [[id number]]  (Abnormal) Collected: 03/21/21 2138    Lab Status: Final result Specimen: Blood from Arm, Left Updated: 03/21/21 2148     WBC 12 40 Thousand/uL      RBC 5 32 Million/uL      Hemoglobin 15 8 g/dL      Hematocrit 47 2 %      MCV 89 fL      MCH 29 6 pg      MCHC 33 4 g/dL      RDW 14 1 %      MPV 7 4 fL      Platelets 435 Thousands/uL      Neutrophils Relative 83 %      Lymphocytes Relative 9 %      Monocytes Relative 7 %      Eosinophils Relative 0 %      Basophils Relative 1 %      Neutrophils Absolute 10 20 Thousands/µL      Lymphocytes Absolute 1 10 Thousands/µL      Monocytes Absolute 0 90 Thousand/µL      Eosinophils Absolute 0 10 Thousand/µL      Basophils Absolute 0 10 Thousands/µL                  XR chest 1 view portable   Final Result by Marjorie Marcial DO (03/22 0200)      No acute cardiopulmonary disease  Workstation performed: [id number]                    Procedures  Procedures         ED Course             HEART Risk Score      Most Recent Value   Heart Score Risk Calculator   History  1 Filed at: 03/21/2021 2300   ECG  1 Filed at: 03/21/2021 2300   Age  2 Filed at: 03/21/2021 2300   Risk Factors  2 Filed at: 03/21/2021 2300   Troponin  0 Filed at: 03/21/2021 2300   HEART Score  6 Filed at: 03/21/2021 2300                                    MDM  Number of Diagnoses or Management Options  Chest pain:   Diagnosis management comments: Cp that is left sided and pressure like, similar to last MI  Cp free in er after nitro and asa  Will admit for further eval  Discussed with gretchen who accepted for [facility]      ekg- nsr at 93, inf q waves, non specific t wave, RAD, normal intervals         Disposition  Final diagnoses:   Chest pain     Time reflects when diagnosis was documented in both MDM as applicable and the Disposition within this note     Time User Action Codes Description Comment    3/21/2021 11:00 PM Cristi Gross Add [R07 9] Chest pain     3/21/2021 11:19 PM Joe MAHONEY Add [Z95 1] S/P CABG (coronary artery bypass graft)     3/22/2021  1:55 PM Tania Richardson Essential hypertension       ED Disposition     ED Disposition Condition Date/Time Comment    Admit Stable Sun Mar 21, 2021 11:00 PM Case was discussed with gretchen  and the patient's admission status was agreed to be Admission Status: observation status to the service of Dr Janice Jensen   Follow-up Information     Follow up With Specialties Details Why Contact Info    Eufemia Horton PA-C Cardiology, Physician Assistant Follow up in 2 week(s)  [facility] [id number]  [phone]            Discharge Medication List as of 3/22/2021  3:20 PM      START taking these medications    Details   metoprolol tartrate (LOPRESSOR) 25 mg tablet Take 0 5 tablets (12 5 mg total) by mouth every 12 (twelve) hours, Starting Mon 3/22/2021, Normal         CONTINUE these medications which have NOT CHANGED    Details   albuterol (PROVENTIL HFA,VENTOLIN HFA) 90 mcg/act inhaler Inhale 2 puffs every 6 (six) hours as needed for wheezing, Starting u 12/10/2020, Normal      aspirin (ECOTRIN LOW STRENGTH) 81 mg EC tablet Take 81 mg by mouth daily, Historical Med      budesonide-formoterol (SYMBICORT) 160-4 5 mcg/act inhaler Inhale 2 puffs 2 (two) times a day Rinse mouth after use , Starting u 3/4/2021, Normal      ezetimibe (ZETIA) 10 mg tablet Take 10 mg by mouth daily Take one tablet by mouth once daily at night , Historical Med      losartan (COZAAR) 50 mg tablet Take 1 tablet (50 mg total) by mouth daily, Starting u 3/4/2021, Normal      nitroglycerin (NITROSTAT) 0 4 mg SL tablet Place 1 tablet (0 4 mg total) under the tongue every 5 (five) minutes as needed for chest pain, Starting Thu 2/18/2021, Normal      ticagrelor (Brilinta) 60 MG Take 1 tablet (60 mg total) by mouth every 12 (twelve) hours, Starting u 3/4/2021, Normal           Outpatient Discharge Orders   Activity as tolerated     Call provider for:  persistent nausea or vomiting     Call provider for:  severe uncontrolled pain     Call provider for:  difficulty breathing, headache or visual disturbances     Call provider for:  persistent dizziness or light-headedness       PDMP Review     None          ED Provider  Electronically Signed by           Lester Child MD  03/22/21 2121

## 2021-03-22 NOTE — ASSESSMENT & PLAN NOTE
· Cardiology input appreciated  · No signs of acute ACS  · Continue outpatient cardiac follow-up  · Patient with recent stress testing and currently receiving cardiac rehab  · Continue outpatient follow-up

## 2021-03-22 NOTE — PLAN OF CARE
Problem: Potential for Falls  Goal: Patient will remain free of falls  Description: INTERVENTIONS:  - Assess patient frequently for physical needs  -  Identify cognitive and physical deficits and behaviors that affect risk of falls    -  [place] fall precautions as indicated by assessment   - Educate patient/family on patient safety including physical limitations  - Instruct patient to call for assistance with activity based on assessment  - Modify environment to reduce risk of injury  - Consider OT/PT consult to assist with strengthening/mobility  Outcome: Progressing     Problem: CARDIOVASCULAR - ADULT  Goal: Maintains optimal cardiac output and hemodynamic stability  Description: INTERVENTIONS:  - Monitor I/O, vital signs and rhythm  - Monitor for S/S and trends of decreased cardiac output  - Administer and titrate ordered vasoactive medications to optimize hemodynamic stability  - Assess quality of pulses, skin color and temperature  - Assess for signs of decreased coronary artery perfusion  - Instruct patient to report change in severity of symptoms  Outcome: Progressing  Goal: Absence of cardiac dysrhythmias or at baseline rhythm  Description: INTERVENTIONS:  - Continuous cardiac monitoring, vital signs, obtain 12 lead EKG if ordered  - Administer antiarrhythmic and heart rate control medications as ordered  - Monitor electrolytes and administer replacement therapy as ordered  Outcome: Progressing     Problem: METABOLIC, FLUID AND ELECTROLYTES - ADULT  Goal: Electrolytes maintained within normal limits  Description: INTERVENTIONS:  - Monitor labs and assess patient for signs and symptoms of electrolyte imbalances  - Administer electrolyte replacement as ordered  - Monitor response to electrolyte replacements, including repeat lab results as appropriate  - Instruct patient on fluid and nutrition as appropriate  Outcome: Progressing     Problem: SKIN/TISSUE INTEGRITY - ADULT  Goal: Skin integrity remains intact  Description: INTERVENTIONS  - Identify patients at risk for skin breakdown  - Assess and monitor skin integrity  - Assess and monitor nutrition and hydration status  - Monitor labs (i e  albumin)  - Assess for incontinence   - Turn and reposition patient  - Assist with mobility/ambulation  - Relieve pressure over bony prominences  - Avoid friction and shearing  - Provide appropriate hygiene as needed including keeping skin clean and dry  - Evaluate need for skin moisturizer/barrier cream  - Collaborate with interdisciplinary team (i e  Nutrition, Rehabilitation, etc )   - Patient/family teaching  Outcome: Progressing     Problem: PAIN - ADULT  Goal: Verbalizes/displays adequate comfort level or baseline comfort level  Description: Interventions:  - Encourage patient to monitor pain and request assistance  - Assess pain using appropriate pain scale  - Administer analgesics based on type and severity of pain and evaluate response  - Implement non-pharmacological measures as appropriate and evaluate response  - Consider cultural and social influences on pain and pain management  - Notify physician/advanced practitioner if interventions unsuccessful or patient reports new pain  Outcome: Progressing     Problem: INFECTION - ADULT  Goal: Absence or prevention of progression during hospitalization  Description: INTERVENTIONS:  - Assess and monitor for signs and symptoms of infection  - Monitor lab/diagnostic results  - Monitor all insertion sites, i e  indwelling lines, tubes, and drains  - Monitor endotracheal if appropriate and nasal secretions for changes in amount and color  - [facility] appropriate cooling/warming therapies per order  - Administer medications as ordered  - Instruct and encourage patient and family to use good hand hygiene technique  - Identify and instruct in appropriate isolation precautions for identified infection/condition  Outcome: Progressing  Goal: Absence of fever/infection during neutropenic period  Description: INTERVENTIONS:  - Monitor WBC    Outcome: Progressing     Problem: SAFETY ADULT  Goal: Patient will remain free of falls  Description: INTERVENTIONS:  - Assess patient frequently for physical needs  -  Identify cognitive and physical deficits and behaviors that affect risk of falls    -  [place] fall precautions as indicated by assessment   - Educate patient/family on patient safety including physical limitations  - Instruct patient to call for assistance with activity based on assessment  - Modify environment to reduce risk of injury  - Consider OT/PT consult to assist with strengthening/mobility  Outcome: Progressing  Goal: Maintain or return to baseline ADL function  Description: INTERVENTIONS:  -  Assess patient's ability to carry out ADLs; assess patient's baseline for ADL function and identify physical deficits which impact ability to perform ADLs (bathing, care of mouth/teeth, toileting, grooming, dressing, etc )  - Assess/evaluate cause of self-care deficits   - Assess range of motion  - Assess patient's mobility; develop plan if impaired  - Assess patient's need for assistive devices and provide as appropriate  - Encourage maximum independence but intervene and supervise when necessary  - Involve family in performance of ADLs  - Assess for home care needs following discharge   - Consider OT consult to assist with ADL evaluation and planning for discharge  - Provide patient education as appropriate  Outcome: Progressing  Goal: Maintain or return mobility status to optimal level  Description: INTERVENTIONS:  - Assess patient's baseline mobility status (ambulation, transfers, stairs, etc )    - Identify cognitive and physical deficits and behaviors that affect mobility  - Identify mobility aids required to assist with transfers and/or ambulation (gait belt, sit-to-stand, lift, walker, cane, etc )  - [place] fall precautions as indicated by assessment  - Record patient progress and toleration of activity level on Mobility SBAR; progress patient to next Phase/Stage  - Instruct patient to call for assistance with activity based on assessment  - Consider rehabilitation consult to assist with strengthening/weightbearing, etc   Outcome: Progressing     Problem: DISCHARGE PLANNING  Goal: Discharge to home or other facility with appropriate resources  Description: INTERVENTIONS:  - Identify barriers to discharge w/patient and caregiver  - Arrange for needed discharge resources and transportation as appropriate  - Identify discharge learning needs (meds, wound care, etc )  - Refer to Case Management Department for coordinating discharge planning if the patient needs post-hospital services based on physician/advanced practitioner order or complex needs related to functional status, cognitive ability, or social support system  Outcome: Progressing     Problem: Knowledge Deficit  Goal: Patient/family/caregiver demonstrates understanding of disease process, treatment plan, medications, and discharge instructions  Description: Complete learning assessment and assess knowledge base  Interventions:  - Provide teaching at level of understanding  - Provide teaching via preferred learning methods  Outcome: Progressing     Problem: Nutrition/Hydration-ADULT  Goal: Nutrient/Hydration intake appropriate for improving, restoring or maintaining nutritional needs  Description: Monitor and assess patient's nutrition/hydration status for malnutrition  Collaborate with interdisciplinary team and initiate plan and interventions as ordered  Monitor patient's weight and dietary intake as ordered or per policy  Utilize nutrition screening tool and intervene as necessary  Determine patient's food preferences and provide high-protein, high-caloric foods as appropriate       INTERVENTIONS:  - Monitor oral intake, urinary output, labs, and treatment plans  - Assess nutrition and hydration status and recommend course of action  - Evaluate amount of meals eaten  - Assist patient with eating if necessary   - Allow adequate time for meals  - Recommend/ encourage appropriate diets, oral nutritional supplements, and vitamin/mineral supplements  - Order, calculate, and assess calorie counts as needed  - Recommend, monitor, and adjust tube feedings and TPN/PPN based on assessed needs  - Assess need for intravenous fluids  - Provide specific nutrition/hydration education as appropriate  - Include patient/family/caregiver in decisions related to nutrition  Outcome: Progressing

## 2021-03-22 NOTE — ASSESSMENT & PLAN NOTE
· No signs of infection at this time  · Patient afebrile  · Procalcitonin normal  · UA and chest x-ray within normal limits

## 2021-03-22 NOTE — PLAN OF CARE
Problem: Potential for Falls  Goal: Patient will remain free of falls  Description: INTERVENTIONS:  - Assess patient frequently for physical needs  -  Identify cognitive and physical deficits and behaviors that affect risk of falls    -  [place] fall precautions as indicated by assessment   - Educate patient/family on patient safety including physical limitations  - Instruct patient to call for assistance with activity based on assessment  - Modify environment to reduce risk of injury  - Consider OT/PT consult to assist with strengthening/mobility  Outcome: Progressing     Problem: CARDIOVASCULAR - ADULT  Goal: Maintains optimal cardiac output and hemodynamic stability  Description: INTERVENTIONS:  - Monitor I/O, vital signs and rhythm  - Monitor for S/S and trends of decreased cardiac output  - Administer and titrate ordered vasoactive medications to optimize hemodynamic stability  - Assess quality of pulses, skin color and temperature  - Assess for signs of decreased coronary artery perfusion  - Instruct patient to report change in severity of symptoms  Outcome: Progressing  Goal: Absence of cardiac dysrhythmias or at baseline rhythm  Description: INTERVENTIONS:  - Continuous cardiac monitoring, vital signs, obtain 12 lead EKG if ordered  - Administer antiarrhythmic and heart rate control medications as ordered  - Monitor electrolytes and administer replacement therapy as ordered  Outcome: Progressing     Problem: METABOLIC, FLUID AND ELECTROLYTES - ADULT  Goal: Electrolytes maintained within normal limits  Description: INTERVENTIONS:  - Monitor labs and assess patient for signs and symptoms of electrolyte imbalances  - Administer electrolyte replacement as ordered  - Monitor response to electrolyte replacements, including repeat lab results as appropriate  - Instruct patient on fluid and nutrition as appropriate  Outcome: Progressing     Problem: SKIN/TISSUE INTEGRITY - ADULT  Goal: Skin integrity remains intact  Description: INTERVENTIONS  - Identify patients at risk for skin breakdown  - Assess and monitor skin integrity  - Assess and monitor nutrition and hydration status  - Monitor labs (i e  albumin)  - Assess for incontinence   - Turn and reposition patient  - Assist with mobility/ambulation  - Relieve pressure over bony prominences  - Avoid friction and shearing  - Provide appropriate hygiene as needed including keeping skin clean and dry  - Evaluate need for skin moisturizer/barrier cream  - Collaborate with interdisciplinary team (i e  Nutrition, Rehabilitation, etc )   - Patient/family teaching  Outcome: Progressing     Problem: PAIN - ADULT  Goal: Verbalizes/displays adequate comfort level or baseline comfort level  Description: Interventions:  - Encourage patient to monitor pain and request assistance  - Assess pain using appropriate pain scale  - Administer analgesics based on type and severity of pain and evaluate response  - Implement non-pharmacological measures as appropriate and evaluate response  - Consider cultural and social influences on pain and pain management  - Notify physician/advanced practitioner if interventions unsuccessful or patient reports new pain  Outcome: Progressing     Problem: INFECTION - ADULT  Goal: Absence or prevention of progression during hospitalization  Description: INTERVENTIONS:  - Assess and monitor for signs and symptoms of infection  - Monitor lab/diagnostic results  - Monitor all insertion sites, i e  indwelling lines, tubes, and drains  - Monitor endotracheal if appropriate and nasal secretions for changes in amount and color  - [place] appropriate cooling/warming therapies per order  - Administer medications as ordered  - Instruct and encourage patient and family to use good hand hygiene technique  - Identify and instruct in appropriate isolation precautions for identified infection/condition  Outcome: Progressing  Goal: Absence of fever/infection during neutropenic period  Description: INTERVENTIONS:  - Monitor WBC    Outcome: Progressing     Problem: SAFETY ADULT  Goal: Patient will remain free of falls  Description: INTERVENTIONS:  - Assess patient frequently for physical needs  -  Identify cognitive and physical deficits and behaviors that affect risk of falls  -  [place] fall precautions as indicated by assessment   - Educate patient/family on patient safety including physical limitations  - Instruct patient to call for assistance with activity based on assessment  - Modify environment to reduce risk of injury  - Consider OT/PT consult to assist with strengthening/mobility  Outcome: Progressing  Goal: Maintain or return to baseline ADL function  Description: INTERVENTIONS:  - Assess patient frequently for physical needs  -  Identify cognitive and physical deficits and behaviors that affect risk of falls    -  [place] fall precautions as indicated by assessment   - Educate patient/family on patient safety including physical limitations  - Instruct patient to call for assistance with activity based on assessment  - Modify environment to reduce risk of injury  - Consider OT/PT consult to assist with strengthening/mobility  Outcome: Progressing  Goal: Maintain or return mobility status to optimal level  Description: INTERVENTIONS:  -  Assess patient's ability to carry out ADLs; assess patient's baseline for ADL function and identify physical deficits which impact ability to perform ADLs (bathing, care of mouth/teeth, toileting, grooming, dressing, etc )  - Assess/evaluate cause of self-care deficits   - Assess range of motion  - Assess patient's mobility; develop plan if impaired  - Assess patient's need for assistive devices and provide as appropriate  - Encourage maximum independence but intervene and supervise when necessary  - Involve family in performance of ADLs  - Assess for home care needs following discharge   - Consider OT consult to assist with ADL evaluation and planning for discharge  - Provide patient education as appropriate  Outcome: Progressing     Problem: DISCHARGE PLANNING  Goal: Discharge to home or other facility with appropriate resources  Description: INTERVENTIONS:  - Identify barriers to discharge w/patient and caregiver  - Arrange for needed discharge resources and transportation as appropriate  - Identify discharge learning needs (meds, wound care, etc )  - Arrange for interpretive services to assist at discharge as needed  - Refer to Case Management Department for coordinating discharge planning if the patient needs post-hospital services based on physician/advanced practitioner order or complex needs related to functional status, cognitive ability, or social support system  Outcome: Progressing     Problem: Knowledge Deficit  Goal: Patient/family/caregiver demonstrates understanding of disease process, treatment plan, medications, and discharge instructions  Description: Complete learning assessment and assess knowledge base    Interventions:  - Provide teaching at level of understanding  - Provide teaching via preferred learning methods  Outcome: Progressing

## 2021-03-22 NOTE — NURSING NOTE
Pt in bed, c/o chest tightness on the left side  Cardiology in to see pt  Offers no other complaints at this time  Call bell and belongings within reach, will continue to monitor

## 2021-03-22 NOTE — ASSESSMENT & PLAN NOTE
· History of Cath 2019: stent to OM2 and LCx  · Cath 4/2020 stent to RCA and [id number] x2   · April 2020: [facility]  · Cardiology input appreciated  · Continue home meds

## 2021-03-22 NOTE — CASE MANAGEMENT
Evaluated the pt at the bedside  Pt was admitted to the hospital for CP  Explained the role of CM and the options of discharge planning with the pt  Pt lives with his wife in a 3 story home with 4 YOLAN[address] and 14 steps to the upstairs bedroom  Pt states he is completely independent and drives  Pt does not have a PCP  Provided the [employer] card and encouraged to obtain a PCP  Pt gets any medications at [facility]  Pt states he drove himself to the hospital   Explained the Observation level of care with the pt who verbalized understanding of same  Pt has been evaluated by Cardiology  Patient/caregiver received discharge checklist   Content reviewed  Patient/caregiver encouraged to participate in discharge plan of care prior to discharge home  CM reviewed d/c planning process including the following: identifying help at home, patient preference for d/c planning needs, availability of treatment team to discuss questions or concerns patient and/or family may have regarding understanding medications and recognizing signs and symptoms once discharged  CM also encouraged patient to follow up with all recommended appointments after discharge  Patient advised of importance for patient and family to participate in managing patients medical well being

## 2021-03-22 NOTE — ASSESSMENT & PLAN NOTE
· History of Cath 2019: stent to OM2 and LCx  · Cath 4/2020 stent to RCA and [id number] x2   · April 2020: [facility]  · Cardiology input  · Continue home meds

## 2021-03-22 NOTE — ASSESSMENT & PLAN NOTE
· Blood Pressure has been elevated while in the hospital  · Will discharge on home dose of losartan and will add Lopressor 12 5 mg twice daily  · Follow-up with PCP for further blood pressure management

## 2021-03-22 NOTE — UTILIZATION REVIEW
Initial Clinical Review    Admission: Date/Time/Statement:   Admission Orders (From admission, onward)     Ordered        03/21/21 2301  Place in Observation  Once                   Orders Placed This Encounter   Procedures    Place in Observation     Standing Status:   Standing     Number of Occurrences:   1     Order Specific Question:   Level of Care     Answer:   Med Surg [16]     ED Arrival Information     Expected Arrival Acuity Means of Arrival Escorted By Service Admission Type    - 3/21/2021 21:26 Emergent Walk-In Self Hospitalist Emergency    Arrival Complaint    chest pain        Chief Complaint   Patient presents with    Chest Pain     chest pain shortness of breath since 9 am     Assessment/Plan:   72 yom With cp that started this AM, left sided and tightness  Mild sob as if he can not get a deep breath in  No uri sx reported  Hx of MI, this feels similar  States he felt run down and tired all day like last  MI  No legs welling  Did lifts weight (bench) at cardiac rehab,  He questions if this lead to his sx  reported he was dipahoretic  Chest pain  Assessment & Plan  · History of CAD stents and CABG  · Rule out acute coronary syndrome  · Monitor troponins and telemetry  · Patient was seen by Cardiology mid February in noted fatigued with exertion and was to schedule stress test to rule out ischemic cause  · Cardiology consult     Coronary artery disease involving native coronary artery of native heart without angina pectoris  Assessment & Plan  · History of Cath 2019: stent to OM2 and LCx  ? Cath 4/2020 stent to RCA and [id number] x2   ?  April 2020: LIMA-LAD-Diag  · Cardiology input  · Continue home meds     Essential hypertension  Assessment & Plan  · Continue Cozaar     Mixed hyperlipidemia  Assessment & Plan  · Continue    ED Triage Vitals   Temperature Pulse Respirations Blood Pressure SpO2   03/21/21 2132 03/21/21 2132 03/21/21 2132 03/21/21 2137 03/21/21 2132   100 °F (37 8 °C) 99 (!) 29 (!) 172/80 96 %      Temp Source Heart Rate Source Patient Position - Orthostatic VS BP Location FiO2 (%)   03/21/21 2320 03/21/21 2200 03/21/21 2230 03/21/21 2200 --   Temporal Monitor Sitting Right arm       Pain Score       03/21/21 2230       2          Wt Readings from Last 1 Encounters:   03/21/21 112 kg (246 lb 0 5 oz)     Additional Vital Signs:   Date/Time  Temp  Pulse  Resp  BP  MAP (mmHg)  SpO2  O2 Device  Patient Position - Orthostatic VS   03/22/21 0714  97 8 °F (36 6 °C)  81  18  182/88Abnormal   126  97 %  None (Room air)  Lying   03/22/21 0300  97 4 °F (36 3 °C)Abnormal   75  18  137/65  --  97 %  None (Room air)  Lying   03/21/21 2320  96 7 °F (35 9 °C)Abnormal   86  20  170/78  --  93 %  None (Room air)  Sitting   03/21/21 2230  --  90  18  168/87  120  95 %  None (Room air)  Sitting   03/21/21 2200  --  96  17  152/75  105  96 %  None (Room air)  --   03/21/21 2137  --  --  --  172/80Abnormal   --  --  --  --   03/21/21 2132  100 °F (37 8 °C)  99  29Abnormal   --  --  96 %  --  --     Pertinent Labs/Diagnostic Test Results:   Results from last 7 days   Lab Units 03/21/21 2138   SARS-COV-2  Negative     Results from last 7 days   Lab Units 03/21/21 2138   WBC Thousand/uL 12 40*   HEMOGLOBIN g/dL 15 8   HEMATOCRIT % 47 2*   PLATELETS Thousands/uL 215   NEUTROS ABS Thousands/µL 10 20*     Results from last 7 days   Lab Units 03/22/21  0445 03/21/21 2138   SODIUM mmol/L 139 137   POTASSIUM mmol/L 3 4* 3 5   CHLORIDE mmol/L 102 98   CO2 mmol/L 28 29   ANION GAP mmol/L 9 10   BUN mg/dL 15 14   CREATININE mg/dL 1 01 1 09   EGFR ml/min/1 73sq m 78 71   CALCIUM mg/dL 9 0 9 4   MAGNESIUM mg/dL 2 3  --      Results from last 7 days   Lab Units 03/21/21  2138   AST U/L 15   ALT U/L 15   ALK PHOS U/L 62   TOTAL PROTEIN g/dL 7 1   ALBUMIN g/dL 4 4   TOTAL BILIRUBIN mg/dL 1 30*     Results from last 7 days   Lab Units 03/21/21  2143   POC GLUCOSE mg/dl 152*     Results from last 7 days   Lab Units 03/22/21  7475 03/21/21 2138   GLUCOSE RANDOM mg/dL 133* 173*     Results from last 7 days   Lab Units 03/22/21  0347 03/22/21  0039 03/21/21 2138   TROPONIN I ng/mL <0 03 <0 03 <0 03     Results from last 7 days   Lab Units 03/21/21 2139   PROTIME seconds 12 7   INR  0 96   PTT seconds 30     Results from last 7 days   Lab Units 03/21/21 2138   BNP pg/mL 43     Results from last 7 days   Lab Units 03/22/21  0519   CLARITY UA  Clear   COLOR UA  Yellow   SPEC GRAV UA  1 025   PH UA  6 0   GLUCOSE UA mg/dl Trace*   KETONES UA mg/dl Negative   BLOOD UA  Negative   PROTEIN UA mg/dl Negative   NITRITE UA  Negative   BILIRUBIN UA  Negative   UROBILINOGEN UA E U /dl 0 2   LEUKOCYTES UA  Negative     Results from last 7 days   Lab Units 03/21/21 2138   INFLUENZA A PCR  Negative   INFLUENZA B PCR  Negative   RSV PCR  Negative     3/21  Cxr=  No acute cardiopulmonary disease      ED Treatment:   Medication Administration from 03/21/2021 2126 to 03/21/2021 2329       Date/Time Order Dose Route Action     03/21/2021 2157 nitroglycerin (NITROSTAT) SL tablet 0 4 mg 0 4 mg Sublingual Given     03/21/2021 2157 aspirin chewable tablet 324 mg 324 mg Oral Given        Past Medical History:   Diagnosis Date    Asthma     Atrial fibrillation (HCC)     Coronary artery disease     HTN (hypertension)     Hyperlipidemia     Myocardial infarction ([facility] ) 02/2019    Stroke ([facility] )     TIA (transient ischemic attack)      Present on Admission:   Coronary artery disease involving native coronary artery of native heart without angina pectoris   Essential hypertension   Mixed hyperlipidemia   Chest pain   Leukocytosis    Admitting Diagnosis: Chest pain [R07 9]  S/P CABG (coronary artery bypass graft) [Z95 1]  Age/Sex: 72 y o  male  Admission Orders:  Telemetry  Contact & airborne isolation  Consult cardio    Scheduled Medications:  aspirin, 81 mg, Oral, HS  budesonide-formoterol, 2 puff, Inhalation, BID  enoxaparin, 40 mg, Subcutaneous, Daily  ezetimibe, 10 mg, Oral, HS  losartan, 50 mg, Oral, HS  ticagrelor, 60 mg, Oral, Q12H [facility]    PRN Meds:  acetaminophen, 650 mg, Oral, Q4H PRN  albuterol, 2 puff, Inhalation, Q6H PRN  nitroglycerin, 0 4 mg, Sublingual, Q5 Min PRN  ondansetron, 4 mg, Intravenous, Q6H PRN    Network Utilization Review Department  ATTENTION: Please call with any questions or concerns to [phone] and carefully listen to the prompts so that you are directed to the right person  All voicemails are confidential   Lilia Norman all requests for admission clinical reviews, approved or denied determinations and any other requests to dedicated fax number below belonging to the campus where the patient is receiving treatment   List of dedicated fax numbers for the Facilities:  [address] DENIALS (Administrative/Medical Necessity) [phone]   [address] (Maternity/NICU/Pediatrics) [phone]   [address] [place] [address] Dr Kemi Crawford [phone] (  Taran Chaidez "Janell" 103) [address] [place] [address] Carmelina Cordova [address] [place] [address] [phone]

## 2021-03-22 NOTE — DISCHARGE SUMMARY
[address]  Discharge- David Clarke 1955, 72 y o  male MRN: [id number]  Unit/Bed#: -01 Encounter: [id number]  Primary Care Provider: No primary care provider on file  Date and time admitted to hospital: 3/21/2021  9:36 PM    * Chest pain  Assessment & Plan  · Cardiology input appreciated  · No signs of acute ACS  · Continue outpatient cardiac follow-up  · Patient with recent stress testing and currently receiving cardiac rehab  · Continue outpatient follow-up    Essential hypertension  Assessment & Plan  · Blood Pressure has been elevated while in the hospital  · Will discharge on home dose of losartan and will add Lopressor 12 5 mg twice daily  · Follow-up with PCP for further blood pressure management    Leukocytosis  Assessment & Plan  · No signs of infection at this time  · Patient afebrile  · Procalcitonin normal  · UA and chest x-ray within normal limits    Coronary artery disease involving native coronary artery of native heart without angina pectoris  Assessment & Plan  · History of Cath 2019: stent to OM2 and LCx  · Cath 4/2020 stent to RCA and [id number] x2   · April 2020: LIMA-LAD-Diag  · Cardiology input appreciated  · Continue home meds    Mixed hyperlipidemia  Assessment & Plan  · Continue Zetia    Discharging Physician / Practitioner: Cynthia Osuna MD  PCP: No primary care provider on file  Admission Date:   Admission Orders (From admission, onward)     Ordered        03/21/21 2301  Place in Observation  Once                   Discharge Date: 03/22/21    Resolved Problems  Date Reviewed: 3/22/2021    None          Consultations During Hospital Stay:  · Cardiology    Procedures Performed:   · None    Significant Findings / Test Results:   · Chest pain    Incidental Findings:   · None     Test Results Pending at Discharge (will require follow up):    · None     Outpatient Tests Requested:  · Routine labs with PCP    Complications:     None    Reason for Admission:  Chest pain    Hospital Course:     Rebecca Lan is a 72 y o  male patient who originally presented to the hospital on 3/21/2021 due to chest pain  Patient admitted for observation  Troponins have been negative  D-dimer was checked and also negative  Blood pressure has been fluctuating, metoprolol 12 5 mg twice daily added to patient's home regimen  Patient cleared by Cardiology for discharge  Patient advised to follow-up with his PCP and cardiologist as outpatient    Please see above list of diagnoses and related plan for additional information  Condition at Discharge: stable     Discharge Day Visit / Exam:     Subjective:  Still with mild left-sided chest pain which has significantly improved from admission  No shortness of breath or palpitations  No nausea vomiting  No fever or chills  Patient tolerating diet    Vitals: Blood Pressure: 162/83 (03/22/21 1242)  Pulse: 100 (03/22/21 1242)  Temperature: 99 6 °F (37 6 °C) (03/22/21 1100)  Temp Source: Tympanic (03/22/21 1100)  Respirations: 19 (03/22/21 1100)  Height: 6' 4" (193 cm) (03/21/21 2320)  Weight - Scale: 112 kg (246 lb 0 5 oz) (03/21/21 2320)  SpO2: 96 % (03/22/21 1100)     Exam:   Physical Exam  Constitutional:       General: He is not in acute distress  HENT:      Head: Normocephalic and atraumatic  Nose: Nose normal       Mouth/Throat:      Mouth: Mucous membranes are moist    Eyes:      Extraocular Movements: Extraocular movements intact  Conjunctiva/sclera: Conjunctivae normal    Neck:      Musculoskeletal: Normal range of motion and neck supple  Cardiovascular:      Rate and Rhythm: Normal rate and regular rhythm  Pulmonary:      Effort: Pulmonary effort is normal  No respiratory distress  Abdominal:      Palpations: Abdomen is soft  Tenderness: There is no abdominal tenderness  Musculoskeletal: Normal range of motion  General: No swelling  Skin:     General: Skin is warm and dry  Neurological:      General: No focal deficit present  Mental Status: He is alert  Cranial Nerves: No cranial nerve deficit  Psychiatric:         Mood and Affect: Mood normal          Behavior: Behavior normal          Discharge instructions/Information to patient and family:   See after visit summary for information provided to patient and family  Provisions for Follow-Up Care:  See after visit summary for information related to follow-up care and any pertinent home health orders  Disposition:     Home      Planned Readmission:    No     Discharge Statement:  I spent 35 minutes discharging the patient  This time was spent on the day of discharge  I had direct contact with the patient on the day of discharge  Greater than 50% of the total time was spent examining patient, answering all patient questions, arranging and discussing plan of care with patient as well as directly providing post-discharge instructions  Additional time then spent on discharge activities  Discharge Medications:  See after visit summary for reconciled discharge medications provided to patient and family        ** Please Note: This note has been constructed using a voice recognition system **

## 2021-03-22 NOTE — H&P
[address] [facility]  H&P- Vania Robledo 1955, 72 y o  male MRN: [id number]  Unit/Bed#: ED 09 Encounter: [id number]  Primary Care Provider: No primary care provider on file  Date and time admitted to hospital: 3/21/2021  9:36 PM    * Chest pain  Assessment & Plan  · History of CAD stents and CABG  · Rule out acute coronary syndrome  · Monitor troponins and telemetry  · Patient was seen by Cardiology mid February in noted fatigued with exertion and was to schedule stress test to rule out ischemic cause  · Cardiology consult    Coronary artery disease involving native coronary artery of native heart without angina pectoris  Assessment & Plan  · History of Cath 2019: stent to OM2 and LCx  · Cath 4/2020 stent to RCA and [id number] x2   · April 2020: LIMA-LAD-Diag  · Cardiology input  · Continue home meds    Essential hypertension  Assessment & Plan  · Continue Cozaar    Mixed hyperlipidemia  Assessment & Plan  · Continue      VTE Prophylaxis: Enoxaparin (Lovenox)  Code Status: full code  POLST: There is no POLST form on file for this patient (pre-hospital)  Discussion with patient    Anticipated Length of Stay:  Patient will be admitted on an Observation basis with an anticipated length of stay of  < 2 midnights  Justification for Hospital Stay: ACS rule out    Chief Complaint:   Chest pain    History of Present Illness:    Vania Robledo is a 72 y o  male who presents with chest pain  Patient with past medical history stroke, hypertension, hyperlipidemia, MI, CAD with history of stents and CABG presents to the ER secondary to chest pain  Patient was seen by his Cardiology office in February had an exercise stress test performed which was felt to be nondiagnostic due to resting ST/T-wave abnormality  Patient reported chest pain starting this morning it was left-sided with tightness    He had some mild shortness breath and felt he could not get a deep breath in   Pain and breathing progressively worsened  Reported history of MI in the past and felt similar he noted diaphoresis and fatigue  Denied any leg swelling  He is going to cardiac rehab and did bench press some weight  Took 2 nitro at home  ER treatment:  mg, Nitro SL    Review of Systems:  Review of Systems   Constitutional: Positive for appetite change, diaphoresis and fatigue  Negative for chills and fever  HENT: Negative for rhinorrhea, sore throat and trouble swallowing  Eyes: Negative for discharge and redness  Respiratory: Negative for cough, shortness of breath and wheezing  Cardiovascular: Positive for chest pain  Negative for leg swelling  Gastrointestinal: Negative for abdominal pain, diarrhea, nausea and vomiting  Genitourinary: Positive for decreased urine volume  Negative for dysuria and hematuria  Musculoskeletal: Negative  Negative for back pain and neck pain  Skin: Negative for rash and wound  Neurological: Negative for dizziness, weakness and headaches  Psychiatric/Behavioral: Negative for agitation and confusion  The patient is hyperactive  Past Medical and Surgical History:   Past Medical History:   Diagnosis Date    Asthma     Atrial fibrillation ([facility] )     Coronary artery disease     HTN (hypertension)     Hyperlipidemia     Myocardial infarction ([facility] ) 02/2019    Stroke ([place] [address] )     TIA (transient ischemic attack)        Past Surgical History:   Procedure Laterality Date    CARDIAC CATHETERIZATION  04/17/2020    RCA: 80% distal lesion  LCX/OM2 stent placement  LAD with 70-80% lesion at D1 bifurcation   CARDIAC CATHETERIZATION  2019    OM2 and proximal LCX stenting   CORONARY ARTERY BYPASS GRAFT      CORONARY STENT PLACEMENT  2019    OM2 and proximal LCX stenting       Meds/Allergies:  Prior to Admission medications    Medication Sig Start Date End Date Taking?  Authorizing Provider   albuterol (PROVENTIL HFA,VENTOLIN HFA) 90 mcg/act inhaler Inhale 2 puffs every 6 (six) hours as needed for wheezing 12/10/20   Severo Johnson PA-C   aspirin (ECOTRIN LOW STRENGTH) 81 mg EC tablet Take 81 mg by mouth daily    Historical Provider, MD   atorvastatin (LIPITOR) 40 mg tablet Take 1 tablet (40 mg total) by mouth daily 3/4/21   Severo Johnson PA-C   budesonide-formoterol [facility]) 160-4 5 mcg/act inhaler Inhale 2 puffs 2 (two) times a day Rinse mouth after use  3/4/21   Severo Johnson PA-C   losartan (COZAAR) 50 mg tablet Take 1 tablet (50 mg total) by mouth daily 3/4/21   Severo Johnsno PA-C   nitroglycerin (NITROSTAT) 0 4 mg SL tablet Place 1 tablet (0 4 mg total) under the tongue every 5 (five) minutes as needed for chest pain 2/18/21   Severo Johnson PA-C   ticagrelor (Brilinta) 60 MG Take 1 tablet (60 mg total) by mouth every 12 (twelve) hours 3/4/21   Severo Johnson PA-C     I have reviewed home medications with patient personally  Allergies: No Known Allergies    Social History:  Marital Status: /Civil Union   Occupation:   Patient Pre-hospital Living Situation:  Home  Patient Pre-hospital Level of Mobility:  Mobile  Patient Pre-hospital Diet Restrictions: avoid meat  Substance Use History:   Social History     Substance and Sexual Activity   Alcohol Use Not Currently     Social History     Tobacco Use   Smoking Status Never Smoker   Smokeless Tobacco Never Used     Social History     Substance and Sexual Activity   Drug Use Never       Family History:  I have reviewed the patients family history    Physical Exam:   Vitals:   Blood Pressure: 168/87 (03/21/21 2230)  Pulse: 90 (03/21/21 2230)  Temperature: 100 °F (37 8 °C) (03/21/21 2132)  Respirations: 18 (03/21/21 2230)  Height: 6' 4" (193 cm) (03/21/21 2132)  Weight - Scale: 109 kg (240 lb) (03/21/21 2132)  SpO2: 95 % (03/21/21 2230)    Physical Exam  Vitals signs reviewed  Constitutional:       General: He is not in acute distress  Appearance: Normal appearance        Comments: Appears younger than stated age   HENT:      Head: Normocephalic and atraumatic  Right Ear: External ear normal       Left Ear: External ear normal       Nose: Nose normal    Eyes:      General:         Right eye: No discharge  Left eye: No discharge  Conjunctiva/sclera: Conjunctivae normal    Neck:      Musculoskeletal: Normal range of motion  Cardiovascular:      Rate and Rhythm: Normal rate and regular rhythm  Heart sounds: Normal heart sounds  No murmur  Pulmonary:      Effort: Pulmonary effort is normal  No respiratory distress  Breath sounds: Normal breath sounds  No wheezing or rales  Abdominal:      General: Bowel sounds are normal  There is no distension  Palpations: Abdomen is soft  Tenderness: There is no abdominal tenderness  There is no guarding  Musculoskeletal: Normal range of motion  Skin:     General: Skin is warm and dry  Neurological:      Mental Status: He is alert and oriented to person, place, and time  Mental status is at baseline  Psychiatric:         Mood and Affect: Mood normal          Behavior: Behavior normal          Thought Content: Thought content normal          Judgment: Judgment normal          Additional Data:   Lab Results: I have personally reviewed pertinent reports      Results from last 7 days   Lab Units 03/21/21  2138   WBC Thousand/uL 12 40*   HEMOGLOBIN g/dL 15 8   HEMATOCRIT % 47 2*   PLATELETS Thousands/uL 215   NEUTROS PCT % 83*   LYMPHS PCT % 9*   MONOS PCT % 7   EOS PCT % 0     Results from last 7 days   Lab Units 03/21/21  2138   SODIUM mmol/L 137   POTASSIUM mmol/L 3 5   CHLORIDE mmol/L 98   CO2 mmol/L 29   BUN mg/dL 14   CREATININE mg/dL 1 09   CALCIUM mg/dL 9 4   ALK PHOS U/L 62   ALT U/L 15   AST U/L 15     Results from last 7 days   Lab Units 03/21/21  2139   INR  0 96     Imaging: Formal read pending  XR chest 1 view portable    (Results Pending)     Epic Records Reviewed: Yes     ** Please Note: This note has been constructed using a voice recognition system   **

## 2021-03-22 NOTE — ASSESSMENT & PLAN NOTE
Cath 2019: stent to OM2 and LCx  Cath 4/2020 stent to RCA and [facility] x2   Robotic CABG April 2020: LIMA-LAD-Bob  See comments with regard to chest pain   Patient can discontinue [place] since he is more than one year post stenting

## 2021-03-22 NOTE — ASSESSMENT & PLAN NOTE
Chest discomfort lasting for hours in the setting of elevated BP    Pleuritic component with some reproducible aspect as well     Check echo to be sure there is no pericardial effusion    Troponin levels are negative    EKG is non-ischemic    Recent stress test was non-ischemic   Alleviated with NTG and aspirin    Not clear if this is due to BP control from use of nitro    BB will be added for BP and HR controll

## 2021-03-22 NOTE — PROGRESS NOTES
Cardiac Rehabilitation Plan of Care   90 Day Reassessment          Today's date: 3/22/2021   # of Exercise Sessions Completed:   Due to deductible patient has agreed to attend 2X week for 24 to 36  Patient name: Franklin Estevez      : 1955  Age: 72 y o  MRN: [id number]  Referring Physician: China Acosta PA-C  Cardiologist: Dr Deyanira Aguilar MD  Provider: [facility]  Clinician: Filippo Cummins So, MPT    Dx: S/P CABG X 2 2020  Hx SHERIF X2-RCA & OM1 2020  NSTEMI  Hx SHERIF 2019  HTN    Date of onset: 2020      SUMMARY OF PROGRESS:  Patient has completed 26 visits as of this date  He performs his sessions w/correct hemodynamic responses  His resting vitals were HR 70 and /62  His peak vitals were  and /80  His recovery vitals were HR 87 and /66  He performs his sessions on room air and is able to maintain his 02 saturation at 96% or greater  He rates his program a 3 to 5 on a 1-10 RPE Scale  He denies any symptoms  He is able to work consistently at a 4 01 Met Level  Jackson Torrez has been a good rehabilitation candidate due to high prior level of function, willingness to progress and support system  His program will continue to  be progressed as he is able to tolerate  He will continue to receive education specific to his disease process  Jackson Torrez has been progressing well in CR  He gives great effort during each session  He is working at a 4 07 MET Level improved from a 3 95 MET Level one month ago  He has been asymptomatic until this weekend  He was admitted to the [facility] w/ complaints of chest pain  He is working hard to improve his lipid panel and A1C  Telemetry reveals NSR w/o T Wave Inversion in the last 30 days         Medication compliance: Yes   Comments: Pt reports to be compliant with medications  Fall Risk: Low   Comments: Ambulates with a steady gait with no assist device    EKG changes: Telemetry reveals NSR w/ no T Wave Inversion in the last 30 days  History of Sinus Bradycardia; Betablocker recently decreased by half w/good results  EXERCISE ASSESSMENT and PLAN    Current Exercise Program in Rehab:       Frequency: 3 days/week Supplement with home exercise 2+ days/wk as tolerated       Minutes: 45-50      METS: 4 to 4 5           HR: 20-30 > RHR 85-95   RPE: 4-6        Modalities: Treadmill, Airdyne bike, UBE, Lifecycle, NuStep and Recumbent bike      Exercise Progression 30 Day Goals :    Frequency: 3 days/week of cardiac rehab     Supplement with home exercise 2+ days/wk as tolerated    Minutes: 50-60                   >150 mins/wk of moderate intensity exercise   METS: 4 5 to 5   HR: 85-95 RPE:4-6   Modalities: Treadmill, Airdyne bike, UBE, Lifecycle, NuStep and Recumbent bike    Strength trainin-3 days / week  12-15 repetitions  1-2 sets per modality    Modalities: Leg Press    Home Exercise: Patient has been perfroming a walking program, weather permitting  Goals: 10% improvement in functional capacity - based on max METs achieved in fitness assessment, Reduced dyspnea with physical activity  0-1/10, improved DASI score by 10%, Exercise 5 days/wk, >150mins/wk of moderate intensity exercise and Attend Rehab regularly    Progression Toward Goals: Will continue to educate and progress as tolerated  Education: Benefit of exercise for CAD risk factors   Plan:education on home exercise guidelines, home exercise 30+ mins 2 days opposite CR and Education class: Risk Factors for Heart Disease  Readiness to change: Action:  (Changing behavior)      NUTRITION ASSESSMENT AND PLAN    Weight control:    Starting weight: 248   Current weight:   246  Waist circumference:    Starting:NA   Current:  NA  Diabetes: N/A  Lipid management: Discussed diet and lipid management    Goals:LDL <100, HDL >40, TRG <150, CHOL <200, fasting BG , improved A1c  < 7 0% and Improved Rate Your Plate score  >94    Progression Toward Goals:  Will continue to educate and progress as tolerated  Education: heart healthy eating  low sodium diet  hydration  nutrition for  lipid management  nutrition for Improved BG control  exercise and diabetes management   Plan: Education class: Reading Food Labels, Education Class: Heart Healthy Eating, replace butter with soft spreads made with olive oil, canola or yogurt, replace refined grain bread with whole grain bread, replace unhealthy snacks with fruits & vegs, eat fewer desserts and sweets, avoid processed foods, will try new grains like brown rice, quinoa, farro, drink more water and keep added daily sugar <25g/day  Readiness to change: Action:  (Changing behavior)      PSYCHOSOCIAL ASSESSMENT AND PLAN    Emotional:  Depression assessment:  PHQ-9 = 1-4 = Minimal Depression            Anxiety measure:  KATE-7 = 0-4  = Not anxious  Self-reported stress level:  5  Social support: Very Good    Goals:  Reduce perceived stress to 1-3/10, improved [facility] QOL < 27, PHQ-9 - reduced severity by one level, continue medical therapy, Physical Fitness in [facility] Score < 3, Social Support in [facility] Score < 3, Daily Activity in [facility] Score < 3, Overall Health in [facility] Score < 3, Quality of Life in [facility] Score < 3 , Change in Health in [place] Score < 3  and improved positive thoughts of well being    Progression Toward Goals: Will continue to educate and progress as tolerated      Education: signs/sxs of depression, benefits of a positive support system, stress management techniques and class:  Stress and Your Health   Plan: Class: Stress and Your Health, Class: Relaxation, Practice relaxation techniques and Exercise  Readiness to change: Action:  (Changing behavior)      OTHER CORE COMPONENTS     Tobacco:   Social History     Tobacco Use   Smoking Status Never Smoker   Smokeless Tobacco Never Used       Tobacco Use Intervention:   N/A:  Patient is a non-smoker     Anginal Symptoms:  None   NTG use: No prescription    Blood pressure:    Restin/62   Exercise: 144/80   Recovery: 128/64    Goals: consistent BP < 130/80, reduced dietary sodium <2300mg, moderate intensity exercise >150 mins/wk and medication compliance    Progression Toward Goals: Will continue to educate and progress as tolerated      Education:  understanding high blood pressure and it's relationship to CAD, low sodium diet and HTN, Education class:  Common Heart Medications and Education class: Understanding Heart Disease  Plan: Class: Understanding Heart Disease and Class: Common Heart Medications  Readiness to change: Action:  (Changing behavior)

## 2021-03-22 NOTE — NURSING NOTE
Received patient from ED, patient continues to have tightness in left chest 3/10  Patient denies any difficulty breathing or SOB at this time  Bed locked in lowest position and call bell within reach  Tele applied and troponins/ekgs continued per order

## 2021-03-22 NOTE — CONSULTS
[facility] 1955, 72 y o  male MRN: [id number]  Unit/Bed#: -01 Encounter: [id number]  Primary Care Provider: No primary care provider on file  Date and time admitted to hospital: 3/21/2021  9:36 PM    Inpatient consult to Cardiology  Consult performed by: Bouchra Smith PA-C  Consult ordered by: Olena Mao PA-C          Essential hypertension  Assessment & Plan  Uncontrolled HTN   Continue current medical therapy   BB will be added for BP and HR control     Coronary artery disease involving native coronary artery of native heart without angina pectoris  Assessment & Plan  Cath 2019: stent to OM2 and LCx  Cath 4/2020 stent to RCA and [facility] x2   Robotic CABG April 2020: LIMA-LAD-Diag  See comments with regard to chest pain   Patient can discontinue Ekaterina Proper since he is more than one year post stenting     Mixed hyperlipidemia  Assessment & Plan  Tolerating statin therapy     * Chest pain  Assessment & Plan  Chest discomfort lasting for hours in the setting of elevated BP    Pleuritic component with some reproducible aspect as well     Check echo to be sure there is no pericardial effusion    Troponin levels are negative    EKG is non-ischemic    Recent stress test was non-ischemic   Alleviated with NTG and aspirin    Not clear if this is due to BP control from use of nitro    BB will be added for BP and HR controll       Thank you for allowing us to see this pleasant patient in consult  We will follow up with Outpatient plans outlined above and make all arrangements  Chief Complaint:  Chief Complaint   Patient presents with    Chest Pain     chest pain shortness of breath since 9 am        History of Present Illness: The patient is a 72year old gentleman with a known Hx of CAD with stenting and CABG, HT and HLD  He comes in with progressively worsening chest tightness and the inability to take a deep breath   He states that  This started early in the morning and progressed throughout the day, but never let up  His pain resembled that he experienced with his previous MI  He became concerned and came to the ED  He was found to be with elevated BP  His BP is now 182/88 and he continues to have pleuritic chest pain and there is a reproducible aspect to his pain  When he came to the ED he was given Nitro and ASA and this alleviated his pain  Troponin levels are negative and EKGs are non-ischemic      Review of Systems: a 10 point review of systems was conducted and is negative except for as mentioned in the HPI or as below  Review of Systems   Constitution: Negative  HENT: Negative  Eyes: Negative  Cardiovascular: Positive for chest pain  Negative for claudication, cyanosis, dyspnea on exertion, irregular heartbeat, leg swelling, near-syncope, orthopnea, palpitations, paroxysmal nocturnal dyspnea and syncope  Respiratory: Positive for shortness of breath  Negative for cough, hemoptysis, sleep disturbances due to breathing, snoring, sputum production and wheezing  Endocrine: Negative  Hematologic/Lymphatic: Negative  Skin: Negative  Musculoskeletal: Negative  Gastrointestinal: Negative  Genitourinary: Negative  Neurological: Negative  Psychiatric/Behavioral: Negative  Allergic/Immunologic: Negative  Past Medical and Surgical History:  Past Medical History:   Diagnosis Date    Asthma     [facility]     Coronary artery disease     HTN (hypertension)     Hyperlipidemia     [facility]    St[facility]     TIA (transient ischemic attack)      Past Surgical History:   Procedure Laterality Date    APPENDECTOMY      CARDIAC CATHETERIZATION  04/17/2020    RCA: 80% distal lesion  LCX/OM2 stent placement  LAD with 70-80% lesion at D1 bifurcation   CARDIAC CATHETERIZATION  2019    OM2 and proximal LCX stenting      CATARACT EXTRACTION Right     CORONARY ARTERY BYPASS GRAFT      CORONARY STENT PLACEMENT  2019    OM2 and proximal LCX stenting     Social History     Substance and Sexual Activity   Alcohol Use Never    Frequency: Never     Social History     Substance and Sexual Activity   Drug Use Never     Social History     Tobacco Use   Smoking Status Never Smoker   Smokeless Tobacco Never Used       Family History:  Family History   Problem Relation Age of Onset    Heart disease Mother     Kidney disease Mother     Heart disease Father        Medication:  Medications Prior to Admission   Medication    albuterol (PROVENTIL HFA,VENTOLIN HFA) 90 mcg/act inhaler    aspirin (ECOTRIN LOW STRENGTH) 81 mg EC tablet    budesonide-formoterol (SYMBICORT) 160-4 5 mcg/act inhaler    ezetimibe (ZETIA) 10 mg tablet    losartan (COZAAR) 50 mg tablet    nitroglycerin (NITROSTAT) 0 4 mg SL tablet    ticagrelor (Brilinta) 60 MG        Allergies:  No Known Allergies    Physical Exam:  Vitals: Blood pressure (!) 182/88, pulse 81, temperature 97 8 °F (36 6 °C), temperature source Temporal, resp  rate 18, height 6' 4" (1 93 m), weight 112 kg (246 lb 0 5 oz), SpO2 97 %  , Body mass index is 29 95 kg/m² ,   Orthostatic Blood Pressures      Most Recent Value   Blood Pressure  (!) 182/88 filed at 03/22/2021 0714   Patient Position - Orthostatic VS  Lying filed at 03/22/2021 9720      , Systolic (31SRD), GFV:318 , Min:137 , JKZ:531   , Diastolic (18QXM), GZU:13, Min:65, Max:88    Physical Exam   Constitutional: He is oriented to person, place, and time  He appears well-developed and well-nourished  HENT:   Head: Normocephalic and atraumatic  Mouth/Throat: Oropharynx is clear and moist    Eyes: Conjunctivae are normal  No scleral icterus  Neck: Normal range of motion  Neck supple  No JVD present  No thyromegaly present  Cardiovascular: Normal rate, regular rhythm, S1 normal, S2 normal and normal heart sounds  Exam reveals no gallop and no friction rub     No murmur heard   Pulmonary/Chest: No respiratory distress  He has no wheezes  He has no rales  Reproducible chest wall pain in the upper left side of the chest wall neat the left shoulder    Abdominal: Soft  Bowel sounds are normal  He exhibits no distension  There is no abdominal tenderness  Aorta not palpable   Musculoskeletal: Normal range of motion  General: No tenderness, deformity or edema  Neurological: He is alert and oriented to person, place, and time  Skin: Skin is warm and dry  Psychiatric: He has a normal mood and affect  His behavior is normal    Nursing note and vitals reviewed          Most Recent Cardiac Imaging:   [facility] 3-2-21: non-ischemic     EKG:Normal sinus rhythm  Possible Left atrial enlargement  Right axis deviation  Incomplete right bundle branch block  Anterior infarct , age undetermined    Lab Results:   Troponins:   Results from last 7 days   Lab Units 03/22/21  0347 03/22/21  0039 03/21/21  2138   TROPONIN I ng/mL <0 03 <0 03 <0 03     BNP:  Results from last 6 Months   Lab Units 03/21/21  2138   BNP pg/mL 43     CBC with diff:   Results from last 7 days   Lab Units 03/21/21  2138   WBC Thousand/uL 12 40*   HEMOGLOBIN g/dL 15 8   HEMATOCRIT % 47 2*   PLATELETS Thousands/uL 215   RBC Million/uL 5 32     CMP:  Results from last 7 days   Lab Units 03/22/21  0445 03/21/21  2138   POTASSIUM mmol/L 3 4* 3 5   CHLORIDE mmol/L 102 98   BUN mg/dL 15 14   CREATININE mg/dL 1 01 1 09   CALCIUM mg/dL 9 0 9 4   AST U/L  --  15   ALT U/L  --  15   ALK PHOS U/L  --  62   EGFR ml/min/1 73sq m 78 71     Lipid Profile:  Results from last 7 days   Lab Units 03/22/21  0445   CHOLESTEROL mg/dL 166   TRIGLYCERIDES mg/dL 166   HDL mg/dL 41   LDL CALC mg/dL 92

## 2021-03-24 ENCOUNTER — APPOINTMENT (OUTPATIENT)
Dept: CARDIAC REHAB | Facility: HOSPITAL | Age: 66
End: 2021-03-24
Payer: COMMERCIAL

## 2021-03-26 ENCOUNTER — APPOINTMENT (OUTPATIENT)
Dept: CARDIAC REHAB | Facility: HOSPITAL | Age: 66
End: 2021-03-26
Payer: COMMERCIAL

## 2021-04-14 ENCOUNTER — TELEPHONE (OUTPATIENT)
Dept: NEUROLOGY | Facility: CLINIC | Age: 66
End: 2021-04-14

## 2021-04-14 ENCOUNTER — OFFICE VISIT (OUTPATIENT)
Dept: CARDIOLOGY CLINIC | Facility: CLINIC | Age: 66
End: 2021-04-14
Payer: COMMERCIAL

## 2021-04-14 VITALS
DIASTOLIC BLOOD PRESSURE: 84 MMHG | SYSTOLIC BLOOD PRESSURE: 130 MMHG | WEIGHT: 246 LBS | BODY MASS INDEX: 29.96 KG/M2 | HEART RATE: 80 BPM | HEIGHT: 76 IN

## 2021-04-14 DIAGNOSIS — E78.2 MIXED HYPERLIPIDEMIA: Chronic | ICD-10-CM

## 2021-04-14 DIAGNOSIS — I10 ESSENTIAL HYPERTENSION: Chronic | ICD-10-CM

## 2021-04-14 DIAGNOSIS — R20.2 PARESTHESIAS: Primary | ICD-10-CM

## 2021-04-14 DIAGNOSIS — I25.10 CORONARY ARTERY DISEASE INVOLVING NATIVE CORONARY ARTERY OF NATIVE HEART WITHOUT ANGINA PECTORIS: Chronic | ICD-10-CM

## 2021-04-14 PROCEDURE — 99214 OFFICE O/P EST MOD 30 MIN: CPT | Performed by: PHYSICIAN ASSISTANT

## 2021-04-14 NOTE — PATIENT INSTRUCTIONS
Stop Brilinta     Resume Cardiac rehab      See neurology and dietician       See ophthalmologist     Lipid test before visit in three months       Return in 3 months

## 2021-04-14 NOTE — ASSESSMENT & PLAN NOTE
Not taking statin regularly   Will take Atorvastatin 40  Will attempt to control diet and exercise and see if we can cut back on statin

## 2021-04-14 NOTE — TELEPHONE ENCOUNTER
Best contact number for patient:[phone]    Emergency Contact name and number:None    Referring provider and telephone [place]    Primary Care Provider Name and if affiliated with [facility]: Per Cardio patient currently has no pcp    Reason for Appointment/Dx:Paresthsia     No      Neurology Location patient would like to be seen: [facility] received? Yes                                                 Records Received? No     Have you ever seen another Neurologist?       No    Insurance [address]    ID/Policy #:    Secondary Insurance:[employer]    ID/Policy#: Workman's Comp/ Accident/ School  Information      Workman's Comp/Accident/School related?        None     If yes name of Insurance company:    Date of Injury:    Type of Injury:    [address] Name and Telephone Number:    Notes:Appointment schedule with Referring provider new patient pack sent                    Appointment date: 08-26-21 @11:00am with Dr Tracy Martin

## 2021-04-14 NOTE — PROGRESS NOTES
[facility]   Outpatient Note  Dulce Cerna  1955  [id number]  [facility] [address] [place] [address], [address] [place] [address] Drive, [address]  Laly Cerna is a 72 y o  male    Assessment and Plan:   Paresthesias  Hx of TIA    Episodes of "fogginess"   Will refer to neurology     Coronary artery disease involving native coronary artery of native heart without angina pectoris  Cath 2019: stent to OM2 and LCx  Cath 4/2020 stent to RCA and OM1  Robotic CABG 4/2020: LIMA-LAD-Diag    Now that he is a year post stenting he will discontinue Brilinta     Recent KOBY non-ischemic  OK to continue cardiac rehab   Continues to have fatigue and a sensation of "fogginess"    See additional comments       Mixed hyperlipidemia  Not taking statin regularly   Will take Atorvastatin 40  Will attempt to control diet and exercise and see if we can cut back on statin     Essential hypertension  Controlled on current medical regimen   No changes made today     Chest pain  Full work up in the hospital was negative   Will continue medical management     S/P CABG (coronary artery bypass graft)  Robotic CABG x2 4/2020: LIMA-LAD-Diag   Now with fatigue   See comments         Additional Plan:   Medications as detailed above  Pertinent testing orders as outlined  Available lab and test results are reviewed with the patient and any additional required labs are ordered as above  Return visit will be in three months or earlier if there are problems  The patient is encouraged to call in the meantime if there are questions or concerns  Subjective: The patient is seen regarding CAD with streting in 2019 to OM2 and LCx and in 4/2020 to RCA and OM  He also had Robotic CABG x2 4/2020 with  LIMA to LAD and the diagonal vessels  EF on Cath was 55%  He is now complaining of fatigue  A recent KOBY is non-ischemic  He is on a very small dose of BB   He recently was in the hospital for Chest pain that was thought to be due to anxiety  Work up was negative for CAD  He continues to have fatigue  But when he is active he seems to be more energetic  He has episodes of "fogginess" and would like to see a neurologist since this has been happening since having his TIA  He has no chest pain per se  He has no exertional dyspnea  He has no palpitations syncope or near syncope, and denies edema orthopnea or PND  He does not complain of TIA or CVA symptoms  Coronary Artery Disease  Pertinent negatives include no chest pain, leg swelling, palpitations or shortness of breath  Hypertension  Associated symptoms include malaise/fatigue  Pertinent negatives include no chest pain, orthopnea, palpitations, PND or shortness of breath  Social History  Social History     Tobacco Use   Smoking Status Never Smoker   Smokeless Tobacco Never Used   ,   Social History     Substance and Sexual Activity   Alcohol Use Never    Frequency: Never   ,   Social History     Substance and Sexual Activity   Drug Use Never     Family History   Problem Relation Age of Onset    Heart disease Mother     Kidney disease Mother     Heart disease Father        Medical and Surgical History  Past Medical History:   Diagnosis Date    Asthma     [facility]     Coronary artery disease     HTN (hypertension)     Hyperlipidemia     [facility]    Stroke ([facility] )     TIA (transient ischemic attack)      Past Surgical History:   Procedure Laterality Date    APPENDECTOMY      CARDIAC CATHETERIZATION  04/17/2020    RCA: 80% distal lesion  LCX/OM2 stent placement  LAD with 70-80% lesion at D1 bifurcation   CARDIAC CATHETERIZATION  2019    OM2 and proximal LCX stenting      CATARACT EXTRACTION Right     CORONARY ARTERY BYPASS GRAFT      CORONARY STENT PLACEMENT  2019    OM2 and proximal LCX stenting         Current Outpatient Medications:     albuterol (PROVENTIL HFA,VENTOLIN HFA) 90 mcg/act inhaler, Inhale 2 puffs every 6 (six) hours as needed for wheezing, Disp: 1 Inhaler, Rfl: 3    aspirin (ECOTRIN LOW STRENGTH) 81 mg EC tablet, Take 81 mg by mouth daily, Disp: , Rfl:     budesonide-formoterol (SYMBICORT) 160-4 5 mcg/act inhaler, Inhale 2 puffs 2 (two) times a day Rinse mouth after use , Disp: 1 Inhaler, Rfl: 3    ezetimibe (ZETIA) 10 mg tablet, Take 10 mg by mouth daily Take one tablet by mouth once daily at night , Disp: , Rfl:     losartan (COZAAR) 50 mg tablet, Take 1 tablet (50 mg total) by mouth daily, Disp: 30 tablet, Rfl: 5    metoprolol tartrate (LOPRESSOR) 25 mg tablet, Take 0 5 tablets (12 5 mg total) by mouth every 12 (twelve) hours, Disp: 60 tablet, Rfl: 0    nitroglycerin (NITROSTAT) 0 4 mg SL tablet, Place 1 tablet (0 4 mg total) under the tongue every 5 (five) minutes as needed for chest pain, Disp: 25 tablet, Rfl: 3  No Known Allergies    Review of Systems   Constitution: Positive for malaise/fatigue  HENT: Negative  Eyes: Negative  Cardiovascular: Negative  Negative for chest pain, claudication, cyanosis, dyspnea on exertion, irregular heartbeat, leg swelling, near-syncope, orthopnea, palpitations, paroxysmal nocturnal dyspnea and syncope  Respiratory: Negative  Negative for cough, hemoptysis, shortness of breath, sleep disturbances due to breathing, snoring, sputum production and wheezing  Endocrine: Negative  Hematologic/Lymphatic: Negative  Skin: Negative  Musculoskeletal: Negative  Gastrointestinal: Negative  Genitourinary: Negative  Neurological: Positive for paresthesias (left hand "A finger")  Periods of fogginess   Psychiatric/Behavioral: Negative  Allergic/Immunologic: Negative  Objective:   /84   Pulse 80   Ht 6' 4" (1 93 m)   Wt 112 kg (246 lb)   BMI 29 94 kg/m²   Physical Exam   Constitutional: He is oriented to person, place, and time   He appears well-developed and well-nourished  HENT:   Head: Normocephalic and atraumatic  Mouth/Throat: Oropharynx is clear and moist    Eyes: Conjunctivae are normal  No scleral icterus  Neck: Normal range of motion  Neck supple  No JVD present  No thyromegaly present  Cardiovascular: Regular rhythm, S1 normal, S2 normal and normal heart sounds  Bradycardia present  Exam reveals no gallop and no friction rub  No murmur heard  Pulmonary/Chest: No respiratory distress  He has no wheezes  He has no rales  Abdominal: Soft  Bowel sounds are normal  He exhibits no distension  There is no abdominal tenderness  Aorta not palpable   Musculoskeletal: Normal range of motion  General: No tenderness, deformity or edema  Neurological: He is alert and oriented to person, place, and time  Skin: Skin is warm and dry  Psychiatric: He has a normal mood and affect  His behavior is normal    Nursing note and vitals reviewed        Lab Review:   No results found for: CHOL  Lab Results   Component Value Date    HDL 41 03/22/2021     Lab Results   Component Value Date    LDLCALC 92 03/22/2021     Lab Results   Component Value Date    TRIG 166 03/22/2021     Results Reviewed     None        Results Reviewed     None        Results Reviewed     None          Recent Cardiovascular Testing:   KOBY 3-2-21: No ischemia     Cath 2-: stent to OM2 and LCx    Cath 4/17/2020 stent to RCA and OM1    Robotic CABG 4/27/2020: LIMA-LAD-Diag      ECG Review:   Sinus bradycardia

## 2021-04-19 ENCOUNTER — TELEPHONE (OUTPATIENT)
Dept: CARDIAC REHAB | Facility: CLINIC | Age: 66
End: 2021-04-19

## 2021-04-21 ENCOUNTER — TELEPHONE (OUTPATIENT)
Dept: CARDIAC REHAB | Facility: HOSPITAL | Age: 66
End: 2021-04-21

## 2021-04-21 NOTE — TELEPHONE ENCOUNTER
Called patient to follow up with his medical hold for Cardiac Rehab  He states that he was kept in the hospital for observation for a few days  He did see his cardiologist and she did clear him to return  Patient states he will resume rehab on 4/26/21

## 2021-04-26 ENCOUNTER — TELEPHONE (OUTPATIENT)
Dept: CARDIAC REHAB | Facility: HOSPITAL | Age: 66
End: 2021-04-26

## 2021-04-26 NOTE — TELEPHONE ENCOUNTER
Called patient in regards to no Show for Cardiac Rehab  VM box was full on mobile device  Also called home phone  Patient states he got stuck down in [place] for work

## 2021-05-03 ENCOUNTER — CLINICAL SUPPORT (OUTPATIENT)
Dept: CARDIAC REHAB | Facility: HOSPITAL | Age: 66
End: 2021-05-03
Payer: COMMERCIAL

## 2021-05-03 DIAGNOSIS — Z95.1 S/P CABG (CORONARY ARTERY BYPASS GRAFT): ICD-10-CM

## 2021-05-03 PROCEDURE — 93798 PHYS/QHP OP CAR RHAB W/ECG: CPT

## 2021-05-03 NOTE — PROGRESS NOTES
Cardiac Rehabilitation Plan of Care   120 Day Assessment          Today's date: 5/3/2021   # of Exercise Sessions Completed:   Due to deductible patient has agreed to attend 2-3X week for  36 sessions  Patient name: Jennifer Hsieh      : 1955  Age: 72 y o  MRN: [id number]  Referring Physician: Melanie De Santiago PA-C  Cardiologist: Dr Robb Bradford MD  Provider: Oscar perea  Clinician: aEn Krishnamurthy, MPT    Dx: S/P CABG X 2 2020  Hx SHERIF X2-RCA & OM1 2020  NSTEMI  Hx SHERIF 2019  HTN    Date of onset: 2020      SUMMARY OF PROGRESS:  Patient has completed 27 visits as of this date  William Dumont returned today after medical hold due to complaints of chest pain  Patient was hospitalized  There were no signs of ischemia  Symptoms thought to be related to anxiety  Patient also received neurologic referral due to "fogginess" following a recent TIA  Patient was medically cleared to resume CR  He performs his sessions w/correct hemodynamic responses  His resting vitals were HR 70 and /62  His peak vitals were  and /80  His recovery vitals were HR 87 and /66  He performs his sessions on room air and is able to maintain his 02 saturation at 96% or greater  He rates his program a 3 to 5 on a 1-10 RPE Scale  He denies any symptoms  He is able to work consistently at a 4 01 Met Level  William Dumont has been a good rehabilitation candidate due to high prior level of function, willingness to progress and support system  His program will continue to  be progressed as he is able to tolerate  He will continue to receive education specific to his disease process  William Dumont has been progressing well in CR  He gives great effort during each session  He is working at a 4 07 MET Level improved from a 3 95 MET Level one month ago  He has been asymptomatic until this weekend  He was admitted to the [facility] w/ complaints of chest pain   He is working hard to improve his lipid panel and A1C  Telemetry reveals NSR w/o T Wave Inversion in the last 30 days  Medication compliance: Yes   Comments: Pt reports to be compliant with medications  Fall Risk: Low   Comments: Ambulates with a steady gait with no assist device    EKG changes: Telemetry reveals NSR w/T wave Inversion and occasional PVCs  History of Sinus Bradycardia; Betablocker recently decreased by half w/good results  EXERCISE ASSESSMENT and PLAN    Current Exercise Program in Rehab:       Frequency: 3 days/week Supplement with home exercise 2+ days/wk as tolerated       Minutes: 45-50      METS: 4 to 4 5           HR: 20-30 > RHR 85-95   RPE: 4-6        Modalities: Treadmill, Airdyne bike, UBE, Lifecycle, NuStep and Recumbent bike      Exercise Progression 30 Day Goals :    Frequency: 3 days/week of cardiac rehab     Supplement with home exercise 2+ days/wk as tolerated    Minutes: 50-60                   >150 mins/wk of moderate intensity exercise   METS: 4 5 to 5   HR: 85-95 RPE:4-6   Modalities: Treadmill, Airdyne bike, UBE, Lifecycle, NuStep and Recumbent bike    Strength trainin-3 days / week  12-15 repetitions  1-2 sets per modality    Modalities: Leg Press    Home Exercise: Patient has been perfroming a walking program, weather permitting  Goals: 10% improvement in functional capacity - based on max METs achieved in fitness assessment, Reduced dyspnea with physical activity  0-1/10, improved DASI score by 10%, Exercise 5 days/wk, >150mins/wk of moderate intensity exercise and Attend Rehab regularly    Progression Toward Goals: Will continue to educate and progress as tolerated      Education: Benefit of exercise for CAD risk factors   Plan:education on home exercise guidelines, home exercise 30+ mins 2 days opposite CR and Education class: Risk Factors for Heart Disease  Readiness to change: Action:  (Changing behavior)      NUTRITION ASSESSMENT AND PLAN    Weight control:    Starting weight: 248   Current weight:   246  Waist circumference:    Starting:NA   Current:  NA  Diabetes: N/A  Lipid management: Discussed diet and lipid management    Goals:LDL <100, HDL >40, TRG <150, CHOL <200, fasting BG , improved A1c  < 7 0% and Improved Rate Your Plate score  >02    Progression Toward Goals: Will continue to educate and progress as tolerated  Education: heart healthy eating  low sodium diet  hydration  nutrition for  lipid management  nutrition for Improved BG control  exercise and diabetes management   Plan: Education class: Reading Food Labels, Education Class: Heart Healthy Eating, replace butter with soft spreads made with olive oil, canola or yogurt, replace refined grain bread with whole grain bread, replace unhealthy snacks with fruits & vegs, eat fewer desserts and sweets, avoid processed foods, will try new grains like brown rice, quinoa, farro, drink more water and keep added daily sugar <25g/day  Readiness to change: Action:  (Changing behavior)      PSYCHOSOCIAL ASSESSMENT AND PLAN    Emotional:  Depression assessment:  PHQ-9 = 1-4 = Minimal Depression            Anxiety measure:  KATE-7 = 0-4  = Not anxious  Self-reported stress level:  5  Social support: Very Good    Goals:  Reduce perceived stress to 1-3/10, improved [facility] QOL < 27, PHQ-9 - reduced severity by one level, continue medical therapy, Physical Fitness in [facility] Score < 3, Social Support in [facility] Score < 3, Daily Activity in [facility] Score < 3, Overall Health in [facility] Score < 3, Quality of Life in [facility] Score < 3 , Change in Health in [place] Score < 3  and improved positive thoughts of well being    Progression Toward Goals: Will continue to educate and progress as tolerated      Education: signs/sxs of depression, benefits of a positive support system, stress management techniques and class:  Stress and Your Health   Plan: Class: Stress and Your Health, Class: Relaxation, Practice relaxation techniques and Exercise  Readiness to change: Action:  (Changing behavior)      OTHER CORE COMPONENTS     Tobacco:   Social History     Tobacco Use   Smoking Status Never Smoker   Smokeless Tobacco Never Used       Tobacco Use Intervention:   N/A:  Patient is a non-smoker     Anginal Symptoms:  None   NTG use: No prescription    Blood pressure:    Restin/62   Exercise: 144/80   Recovery: 128/64    Goals: consistent BP < 130/80, reduced dietary sodium <2300mg, moderate intensity exercise >150 mins/wk and medication compliance    Progression Toward Goals: Will continue to educate and progress as tolerated      Education:  understanding high blood pressure and it's relationship to CAD, low sodium diet and HTN, Education class:  Common Heart Medications and Education class: Understanding Heart Disease  Plan: Class: Understanding Heart Disease and Class: Common Heart Medications  Readiness to change: Action:  (Changing behavior)

## 2021-05-04 NOTE — ASSESSMENT & PLAN NOTE
Cath 2019: stent to OM2 and LCx  Cath 4/2020 stent to RCA and OM1   Now on Brilinta    Reduced to 90 mg daily on his own    Will go back to 60 BID     Ok to discontinue in April--a year from SHERIF  Robotic CABG 4/2020: LIMA-LAD-Diag    Recent KOBY non-ischemic  Continues to undergo cardiac rehab  Continues to have fatigue  Will discontinue BB altogether No

## 2021-05-05 ENCOUNTER — APPOINTMENT (OUTPATIENT)
Dept: CARDIAC REHAB | Facility: HOSPITAL | Age: 66
End: 2021-05-05
Payer: COMMERCIAL

## 2021-05-10 ENCOUNTER — APPOINTMENT (OUTPATIENT)
Dept: CARDIAC REHAB | Facility: HOSPITAL | Age: 66
End: 2021-05-10
Payer: COMMERCIAL

## 2021-05-11 ENCOUNTER — CLINICAL SUPPORT (OUTPATIENT)
Dept: NUTRITION | Facility: HOSPITAL | Age: 66
End: 2021-05-11
Payer: COMMERCIAL

## 2021-05-11 VITALS — WEIGHT: 245.81 LBS | BODY MASS INDEX: 29.92 KG/M2

## 2021-05-11 DIAGNOSIS — I25.10 CORONARY ARTERY DISEASE INVOLVING NATIVE CORONARY ARTERY OF NATIVE HEART WITHOUT ANGINA PECTORIS: Chronic | ICD-10-CM

## 2021-05-11 NOTE — PROGRESS NOTES
Initial Nutrition Assessment Form    Patient Name: Dulce Cerna    YOB: 1955    Sex: Male     Assessment Date: 5/11/2021  Start Time: 11:12 Stop Time: 12:12 Total Minutes: 60     Data:  Present at session: self   Parent/Patient Concerns: Looking for meal plan   Medical Dx/Reason for Referral: I25 10 (ICD-10-CM) - Coronary artery disease involving native coronary artery of native heart without angina pectoris   Past Medical History:   Diagnosis Date    Asthma     Atrial fibrillation ([facility] )     Coronary artery disease     HTN (hypertension)     Hyperlipidemia     [facility]    Stroke ([facility] )     TIA (transient ischemic attack)        Current Outpatient Medications   Medication Sig Dispense Refill    albuterol (PROVENTIL HFA,VENTOLIN HFA) 90 mcg/act inhaler Inhale 2 puffs every 6 (six) hours as needed for wheezing 1 Inhaler 3    aspirin (ECOTRIN LOW STRENGTH) 81 mg EC tablet Take 81 mg by mouth daily      budesonide-formoterol (SYMBICORT) 160-4 5 mcg/act inhaler Inhale 2 puffs 2 (two) times a day Rinse mouth after use  1 Inhaler 3    ezetimibe (ZETIA) 10 mg tablet Take 10 mg by mouth daily Take one tablet by mouth once daily at night   losartan (COZAAR) 50 mg tablet Take 1 tablet (50 mg total) by mouth daily 30 tablet 5    metoprolol tartrate (LOPRESSOR) 25 mg tablet Take 0 5 tablets (12 5 mg total) by mouth every 12 (twelve) hours 60 tablet 0    nitroglycerin (NITROSTAT) 0 4 mg SL tablet Place 1 tablet (0 4 mg total) under the tongue every 5 (five) minutes as needed for chest pain 25 tablet 3     No current facility-administered medications for this visit  Additional Meds/Supplements: Does not always take vitamins consistently, sometimes B complex, sometimes Vit   C   Special Learning Needs: None identified    Height: HC Readings from Last 3 Encounters:   No data found for [facility] [place]       Weight: Wt Readings from Last 10 Encounters:   05/11/21 112 kg (245 lb 13 oz)   04/14/21 112 kg (246 lb)   03/21/21 112 kg (246 lb 0 5 oz)   03/04/21 113 kg (249 lb)   01/21/21 112 kg (248 lb)   12/10/20 112 kg (246 lb)     Estimated body mass index is 29 92 kg/m² as calculated from the following:    Height as of 4/14/21: 6' 4" (1 93 m)  Weight as of this encounter: 112 kg (245 lb 13 oz)  Recent Weight Change: []Yes     [x]No  Amount: No significant wt  Change       Energy Needs: [place] Equation:  2100 kcal/day 80 g protein 2100 mL   No Known Allergies    Social History     Substance and Sexual Activity   Alcohol Use Never    Frequency: Never       Social History     Tobacco Use   Smoking Status Never Smoker   Smokeless Tobacco Never Used       Who shops? patient   Who cooks? patient   Exercise: Cardiac rehab 3 times/week    Prior Counseling? []Yes     [x]No  When:      Why:         Diet Hx: drinks water mostly, stopped drinking juices, sometimes lemonade   Breakfast:  7-1 a m  (a m  to 1p m )  Sometimes has llamas and eggs if working out here  Sometimes doesn't eat 8-12 intermittent fasting, today had fruit and bananas    Lunch: p m   1-3 p m  or at times 7-8 p m  [place] [facility]) sandwich or vegetables and brown rice  In mushroom sauce        Dinner: Can vary  p m  Had vegetables over [place] egg noodles  Snacks: AM -   PM -   HS - sometimes blueberries, pretzel sticks dipped in mustard         Nutrition Diagnosis:   Overweight/obesity  related to Excess energy intake as evidenced by  BMI more than normative standard for age and sex (overweight 25-29  9)       Medical Nutrition Therapy Intervention:  [x]Individualized Meal Plan  Reviewed types of fats - saturated, unsaturated trans-fat Reviewed Handout Heart Healthy Nutrition Therapy, discussed nutrient requirements for decreased saturated fat, macronutrient needs  [x]Understanding Lab Values Reviewed triglycerides, cholesterol labs    []Basic Pathophysiology of Disease []Food/Medication Interactions   []Food Diary [x]Exercise continue with cardiac rehab recommendations    []Lifestyle/Behavior Modification Techniques []Medication, Mechanism of Action   [x]Label Reading Reviewed looking for sodium, saturated fat/calorie and portion size  []Self Blood Glucose Monitoring   [x]Weight/BMI Goals discussed small weight loss goals 0 5-1#/week []Other -    Other Notes: No recent wt  Change, presents with BMI of 29 92 overweight/borderline obese  Undergoing cardiac rehab  Stated he usually follows Food for life regiment and eats fruit up until 1 in afternoon, organic raspberries, blackberries, two bananas  Enjoys some refined CHO - bread with butter  Trying to follow plant based meal plan, but looves meat and is difficult for him to sustain  Concerned about heart health - post stenting, hx MI, cardiac cath and TIA  Follow up visit planned for 6/15/21  Comprehension: []Excellent  [x]Very Good  []Good  []Fair   []Poor    Receptivity: []Excellent  [x]Very Good  []Good  []Fair   []Poor    Expected Compliance: []Excellent  []Very Good  [x]Good  []Fair   []Poor        Goals:  1  Josie Valencia will prepare his lunch at home daily through next follow up    2    3        No follow-ups on file    Labs:  CMP  Lab Results   Component Value Date    K 3 4 (L) 03/22/2021     03/22/2021    CO2 28 03/22/2021    BUN 15 03/22/2021    CREATININE 1 01 03/22/2021    GLUF 102 (H) 01/22/2021    CALCIUM 9 0 03/22/2021    AST 15 03/21/2021    ALT 15 03/21/2021    ALKPHOS 62 03/21/2021    EGFR 78 03/22/2021       BMP  Lab Results   Component Value Date    CALCIUM 9 0 03/22/2021    K 3 4 (L) 03/22/2021    CO2 28 03/22/2021     03/22/2021    BUN 15 03/22/2021    CREATININE 1 01 03/22/2021       Lipids  No results found for: CHOL  Lab Results   Component Value Date    HDL 41 03/22/2021    HDL 39 (L) 03/12/2021    HDL 48 01/22/2021     Lab Results   Component Value Date    LDLCALC 92 03/22/2021    LDLCALC 136 (H) 03/12/2021    LDLCALC 133 (H) 01/22/2021 Lab Results   Component Value Date    TRIG 166 03/22/2021    TRIG 351 (H) 03/12/2021    TRIG 234 (H) 01/22/2021     No results found for: CHOLHDL    Hemoglobin A1C  Lab Results   Component Value Date    HGBA1C 6 1 (H) 03/22/2021       Fasting Glucose  Lab Results   Component Value Date    GLUF 102 (H) 01/22/2021       Insulin     Thyroid  No results found for: TSH, V7OMWGU, N1NJZSO, THYROIDAB    Hepatic Function Panel  Lab Results   Component Value Date    ALT 15 03/21/2021    AST 15 03/21/2021    ALKPHOS 62 03/21/2021       Celiac Disease Antibody Panel  No results found for: ENDOMYSIAL IGA, GLIADIN IGA, GLIADIN IGG, IGA, TISSUE TRANSGLUT AB, TTG IGA   Iron  No results found for: IRON, TIBC, FERRITIN    Vitamins  No results found for: VITAMIN B2   No results found for: NICOTINAMIDE, NICOTINIC ACID   No results found for: VITAMINB6  No results found for: UFYYDGVL11  No results found for: VITB5  No results found for: [id number]  No results found for: THYROGLB  No results found for: VITAMIN K   No results found for: 25-HYDROXY VIT D   No components found for: KATE De La Rosa MHSc RDN LDN [address] CL[facility]ICAL NUTRITION [facility]  [address] [facility] [address] [phone]

## 2021-05-12 ENCOUNTER — APPOINTMENT (OUTPATIENT)
Dept: CARDIAC REHAB | Facility: HOSPITAL | Age: 66
End: 2021-05-12
Payer: COMMERCIAL

## 2021-05-14 ENCOUNTER — CLINICAL SUPPORT (OUTPATIENT)
Dept: CARDIAC REHAB | Facility: HOSPITAL | Age: 66
End: 2021-05-14
Payer: COMMERCIAL

## 2021-05-14 DIAGNOSIS — Z95.1 S/P CABG (CORONARY ARTERY BYPASS GRAFT): ICD-10-CM

## 2021-05-14 PROCEDURE — 93798 PHYS/QHP OP CAR RHAB W/ECG: CPT

## 2021-05-17 ENCOUNTER — CLINICAL SUPPORT (OUTPATIENT)
Dept: CARDIAC REHAB | Facility: HOSPITAL | Age: 66
End: 2021-05-17
Payer: COMMERCIAL

## 2021-05-17 DIAGNOSIS — Z95.1 S/P CABG (CORONARY ARTERY BYPASS GRAFT): ICD-10-CM

## 2021-05-17 PROCEDURE — 93798 PHYS/QHP OP CAR RHAB W/ECG: CPT

## 2021-05-19 ENCOUNTER — CLINICAL SUPPORT (OUTPATIENT)
Dept: CARDIAC REHAB | Facility: HOSPITAL | Age: 66
End: 2021-05-19
Payer: COMMERCIAL

## 2021-05-19 DIAGNOSIS — Z95.1 S/P CABG (CORONARY ARTERY BYPASS GRAFT): ICD-10-CM

## 2021-05-19 PROCEDURE — 93798 PHYS/QHP OP CAR RHAB W/ECG: CPT

## 2021-05-21 ENCOUNTER — CLINICAL SUPPORT (OUTPATIENT)
Dept: CARDIAC REHAB | Facility: HOSPITAL | Age: 66
End: 2021-05-21
Payer: COMMERCIAL

## 2021-05-21 DIAGNOSIS — Z95.1 S/P CABG (CORONARY ARTERY BYPASS GRAFT): ICD-10-CM

## 2021-05-21 PROCEDURE — 93798 PHYS/QHP OP CAR RHAB W/ECG: CPT

## 2021-05-24 ENCOUNTER — CLINICAL SUPPORT (OUTPATIENT)
Dept: CARDIAC REHAB | Facility: HOSPITAL | Age: 66
End: 2021-05-24
Payer: COMMERCIAL

## 2021-05-24 ENCOUNTER — APPOINTMENT (OUTPATIENT)
Dept: CARDIAC REHAB | Facility: HOSPITAL | Age: 66
End: 2021-05-24
Payer: COMMERCIAL

## 2021-05-24 DIAGNOSIS — Z95.1 S/P CABG (CORONARY ARTERY BYPASS GRAFT): ICD-10-CM

## 2021-05-24 PROCEDURE — 93798 PHYS/QHP OP CAR RHAB W/ECG: CPT

## 2021-05-26 ENCOUNTER — CLINICAL SUPPORT (OUTPATIENT)
Dept: CARDIAC REHAB | Facility: HOSPITAL | Age: 66
End: 2021-05-26
Payer: COMMERCIAL

## 2021-05-26 DIAGNOSIS — Z95.1 S/P CABG (CORONARY ARTERY BYPASS GRAFT): ICD-10-CM

## 2021-05-26 PROCEDURE — 93798 PHYS/QHP OP CAR RHAB W/ECG: CPT

## 2021-05-28 ENCOUNTER — APPOINTMENT (OUTPATIENT)
Dept: CARDIAC REHAB | Facility: HOSPITAL | Age: 66
End: 2021-05-28
Payer: COMMERCIAL

## 2021-05-28 NOTE — PROGRESS NOTES
Cardiac Rehabilitation Plan of Care   150 Day Assesment          Today's date: 2021   # of Exercise Sessions Completed: /36  Due to deductible patient has agreed to attend 2-3X week for  36 sessions  Patient name: Lisa Orozco      : 1955  Age: 77 y o  MRN: [id number]  Referring Physician: Bobo Sanchez PA-C  Cardiologist: Dr Michael Bentley MD  Provider: Zoe Alfaro  Clinician: Corby Felipe, MPT    Dx: S/P CABG X 2 2020  Hx SHERIF X2-RCA & OM1 2020  NSTEMI  Hx SHERIF 2019  HTN    Date of onset: 2020      SUMMARY OF PROGRESS:  Patient has completed 33 CR  visits as of this date  Zaid Sood returned today after medical hold due to complaints of chest pain  Patient was hospitalized  There were no signs of ischemia  Symptoms thought to be related to anxiety  Patient also received neurologic referral due to "fogginess" following a recent TIA  Patient was medically cleared to resume CR  The patient often requires verbal cues and gentle reminders due to forgetfulness  He performs his sessions w/correct hemodynamic responses  His resting vitals were HR 68 and /74  His peak vitals were HR 97 and /78  His recovery vitals were HR 76 and /72  He performs his sessions on room air and is able to maintain his 02 saturation at 96% or greater  He rates his program a 3 to 5 on a 1-10 RPE Scale  He denies any symptoms  He is able to work consistently at a 3 59 to 4 01 MET Level  Zaid Sood has been a good rehabilitation candidate due to high prior level of function, willingness to progress and support system  His program will continue to  be progressed as he is able to tolerate  He will continue to receive education specific to his disease process  He has been participating in the formal education sessions  Zaid Sood has been progressing well in CR  He gives great effort during each session  He is working at a 4 07 MET Level improved from a 3 95 MET Level one month ago  He has been asymptomatic until this weekend  He was [facility] to the [facility] w/ complaints of chest pain  He is working hard to improve his lipid panel and A1C  Medication compliance: Yes   Comments: Pt reports to be compliant with medications  Fall Risk: Low   Comments: Ambulates with a steady gait with no assist device    EKG changes: Telemetry reveals NSR w/T wave Inversion and occasional PVCs  History of Sinus Bradycardia; Betablocker recently decreased by half w/good results  EXERCISE ASSESSMENT and PLAN    Current Exercise Program in Rehab:       Frequency: 3 days/week Supplement with home exercise 2+ days/wk as tolerated       Minutes: 45-50      METS: 4 to 4 5           HR: 20-30 > RHR 85-95   RPE: 4-6        Modalities: Treadmill, Airdyne bike, UBE, Lifecycle, NuStep and Recumbent bike      Exercise Progression 30 Day Goals :    Frequency: 3 days/week of cardiac rehab     Supplement with home exercise 2+ days/wk as tolerated    Minutes: 50-60                   >150 mins/wk of moderate intensity exercise   METS: 4 5 to 5   HR: 85-95 RPE:4-6   Modalities: Treadmill, Airdyne bike, UBE, Lifecycle, NuStep and Recumbent bike    Strength trainin-3 days / week  12-15 repetitions  1-2 sets per modality    Modalities: Leg Press    Home Exercise: Patient has been perfroming a walking program, weather permitting  Increased activity level days off therapy have been encouraged  Goals: 10% improvement in functional capacity - based on max METs achieved in fitness assessment, Reduced dyspnea with physical activity  0-1/10, improved DASI score by 10%, Exercise 5 days/wk, >150mins/wk of moderate intensity exercise and Attend Rehab regularly    Progression Toward Goals: Will continue to educate and progress as tolerated      Education: Benefit of exercise for CAD risk factors   Plan:education on home exercise guidelines, home exercise 30+ mins 2 days opposite CR and Education class: Risk Factors for Heart Disease  Readiness to change: Action:  (Changing behavior)      NUTRITION ASSESSMENT AND PLAN    Weight control:    Starting weight: 248   Current weight:   245  Waist circumference:    Starting:NA   Current:  NA  Diabetes: N/A  Lipid management: Discussed diet and lipid management    Goals:LDL <100, HDL >40, TRG <150, CHOL <200, fasting BG , improved A1c  < 7 0% and Improved Rate Your Plate score  >18    Progression Toward Goals: Will continue to educate and progress as tolerated  Education: heart healthy eating  low sodium diet  hydration  nutrition for  lipid management  nutrition for Improved BG control  exercise and diabetes management   Plan: Education class: Reading Food Labels, Education Class: Heart Healthy Eating, replace butter with soft spreads made with olive oil, canola or yogurt, replace refined grain bread with whole grain bread, replace unhealthy snacks with fruits & vegs, eat fewer desserts and sweets, avoid processed foods, will try new grains like brown rice, quinoa, farro, drink more water and keep added daily sugar <25g/day  Readiness to change: Action:  (Changing behavior)      PSYCHOSOCIAL ASSESSMENT AND PLAN    Emotional:  Depression assessment:  PHQ-9 = 1-4 = Minimal Depression            Anxiety measure:  KATE-7 = 0-4  = Not anxious  Self-reported stress level:  5  Social support: Very Good    Goals:  Reduce perceived stress to 1-3/10, improved [facility] QOL < 27, PHQ-9 - reduced severity by one level, continue medical therapy, Physical Fitness in [facility] Score < 3, Social Support in [facility] Score < 3, Daily Activity in [facility] Score < 3, Overall Health in [facility] Score < 3, Quality of Life in [facility] Score < 3 , Change in Health in [place] Score < 3  and improved positive thoughts of well being    Progression Toward Goals: Will continue to educate and progress as tolerated      Education: signs/sxs of depression, benefits of a positive support system, stress management techniques and class:  Stress and Your Health   Plan: Class: Stress and Your Health, Class: Relaxation, Practice relaxation techniques and Exercise  Readiness to change: Action:  (Changing behavior)      OTHER CORE COMPONENTS     Tobacco:   Social History     Tobacco Use   Smoking Status Never Smoker   Smokeless Tobacco Never Used       Tobacco Use Intervention:   N/A:  Patient is a non-smoker     Anginal Symptoms:  None   NTG use: No prescription    Blood pressure: Patient's resting BP has not been consistently < 130/80   Restin/74   Exercise: 150/78   Recovery: 112/72    Goals: consistent BP < 130/80, reduced dietary sodium <2300mg, moderate intensity exercise >150 mins/wk and medication compliance    Progression Toward Goals: Will continue to educate and progress as tolerated      Education:  understanding high blood pressure and it's relationship to CAD, low sodium diet and HTN, Education class:  Common Heart Medications and Education class: Understanding Heart Disease  Plan: Class: Understanding Heart Disease and Class: Common Heart Medications  Readiness to change: Action:  (Changing behavior)

## 2021-06-01 ENCOUNTER — APPOINTMENT (OUTPATIENT)
Dept: CARDIAC REHAB | Facility: HOSPITAL | Age: 66
End: 2021-06-01
Payer: COMMERCIAL

## 2021-06-02 ENCOUNTER — CLINICAL SUPPORT (OUTPATIENT)
Dept: CARDIAC REHAB | Facility: HOSPITAL | Age: 66
End: 2021-06-02
Payer: COMMERCIAL

## 2021-06-02 DIAGNOSIS — Z95.1 S/P CABG (CORONARY ARTERY BYPASS GRAFT): ICD-10-CM

## 2021-06-02 PROCEDURE — 93798 PHYS/QHP OP CAR RHAB W/ECG: CPT

## 2021-06-04 ENCOUNTER — APPOINTMENT (OUTPATIENT)
Dept: CARDIAC REHAB | Facility: HOSPITAL | Age: 66
End: 2021-06-04
Payer: COMMERCIAL

## 2021-06-07 ENCOUNTER — CLINICAL SUPPORT (OUTPATIENT)
Dept: CARDIAC REHAB | Facility: HOSPITAL | Age: 66
End: 2021-06-07
Payer: COMMERCIAL

## 2021-06-07 DIAGNOSIS — Z95.1 S/P CABG (CORONARY ARTERY BYPASS GRAFT): ICD-10-CM

## 2021-06-07 PROCEDURE — 93798 PHYS/QHP OP CAR RHAB W/ECG: CPT

## 2021-06-16 ENCOUNTER — TELEPHONE (OUTPATIENT)
Dept: CARDIAC REHAB | Facility: HOSPITAL | Age: 66
End: 2021-06-16

## 2021-06-16 ENCOUNTER — APPOINTMENT (OUTPATIENT)
Dept: CARDIAC REHAB | Facility: HOSPITAL | Age: 66
End: 2021-06-16
Payer: COMMERCIAL

## 2021-06-18 ENCOUNTER — CLINICAL SUPPORT (OUTPATIENT)
Dept: CARDIAC REHAB | Facility: HOSPITAL | Age: 66
End: 2021-06-18
Payer: COMMERCIAL

## 2021-06-18 DIAGNOSIS — Z95.1 S/P CABG (CORONARY ARTERY BYPASS GRAFT): ICD-10-CM

## 2021-06-18 PROCEDURE — 93798 PHYS/QHP OP CAR RHAB W/ECG: CPT

## 2021-06-18 NOTE — PROGRESS NOTES
Cardiac Rehabilitation Plan of Care   180 Day Assesment          Today's date: 2021   # of Exercise Sessions Completed:   Patient name: Kristin Araya      : 1955  Age: 77 y o  MRN: [id number]  Referring Physician: Lucinda Doherty PA-C  Cardiologist: Dr Kobe French MD  Provider: Oscar perea  Clinician: ZHENG Lyn    Dx: S/P CABG X 2 2020  Hx SHERIF X2-RCA & OM1 2020  NSTEMI  Hx SHERIF 2019  HTN    Date of onset: 2020      SUMMARY OF PROGRESS:  Patient has completed 36CR  visits as of this date  Patient also received neurologic referral due to "fogginess" following a recent TIA  Patient was medically cleared to resume CR  The patient often requires verbal cues and gentle reminders due to forgetfulness  He performs his sessions w/correct hemodynamic responses  His resting vitals were HR 68 and /74  His peak vitals were HR 97 and /78  His recovery vitals were HR 76 and /72  He performs his sessions on room air and is able to maintain his 02 saturation at 96% or greater  He rates his program a 3 to 5 on a 1-10 RPE Scale  He denies any symptoms  He is able to work consistently at a 3 59 to 4 01 MET Level  Patsy Ferrera has been a good rehabilitation candidate due to high prior level of function, willingness to progress and support system  Patsy Carpentergretchen has been progressing well in CR  He gives great effort during each session  He is working at a 4 07 MET Level improved from a 3 95 MET Level one month ago  He has been asymptomatic  He is working hard to improve his lipid panel and A1C  Patsy Ferrera performed his follow up testing; refer to attached outcome summary for all improvements  He has been discharged this date to a HEP  Patsy Ferrera as been pleased w/the progress he has made in CR  He is now more confident of his abilities  Vandananiya Ferrera is discharged to a HEP this session       Medication compliance: Yes   Comments: Pt reports to be compliant with medications  Fall Risk: Low   Comments: Ambulates with a steady gait with no assist device    EKG changes: Telemetry reveals NSR w/T wave Inversion and occasional PVCs  History of Sinus Bradycardia; Betablocker recently decreased by half w/good results  EXERCISE ASSESSMENT and PLAN    Current Exercise Program in Rehab:       Frequency: 3 days/week Supplement with home exercise 2+ days/wk as tolerated       Minutes: 45-50      METS: 4 to 4 5           HR: 20-30 > RHR 85-95   RPE: 4-6        Modalities: Treadmill, Airdyne bike, UBE, Lifecycle, NuStep and Recumbent bike      Exercise Progression 30 Day Goals :    Frequency: 3 days/week of cardiac rehab     Supplement with home exercise 2+ days/wk as tolerated    Minutes: 50-60                   >150 mins/wk of moderate intensity exercise   METS: 4 5 to 5   HR: 85-95 RPE:4-6   Modalities: Treadmill, Airdyne bike, UBE, Lifecycle, NuStep and Recumbent bike    Strength trainin-3 days / week  12-15 repetitions  1-2 sets per modality    Modalities: Leg Press    Home Exercise: Patient has been perfroming a walking program, weather permitting  Increased activity level days off therapy have been encouraged  Goals: 10% improvement in functional capacity - based on max METs achieved in fitness assessment, Reduced dyspnea with physical activity  0-1/10, improved DASI score by 10%, Exercise 5 days/wk, >150mins/wk of moderate intensity exercise and Attend Rehab regularly    Progression Toward Goals:  Patient will be encouraged to focus on lifestyle modifications following discharge      Education: Benefit of exercise for CAD risk factors   Plan:education on home exercise guidelines, home exercise 30+ mins 2 days opposite CR and Education class: Risk Factors for Heart Disease  Readiness to change: Action:  (Changing behavior)      NUTRITION ASSESSMENT AND PLAN    Weight control:    Starting weight: 248   Current weight:   245  Waist circumference:    Starting:NA   Current: NA  Diabetes: N/A  Lipid management: Discussed diet and lipid management    Goals:LDL <100, HDL >40, TRG <150, CHOL <200, fasting BG , improved A1c  < 7 0% and Improved Rate Your Plate score  >52    Progression Toward Goals: Patient will be encouraged to focus on lifestyle modifications following discharge  Education: heart healthy eating  low sodium diet  hydration  nutrition for  lipid management  nutrition for Improved BG control  exercise and diabetes management   Plan: Education class: Reading Food Labels, Education Class: Heart Healthy Eating, replace butter with soft spreads made with olive oil, canola or yogurt, replace refined grain bread with whole grain bread, replace unhealthy snacks with fruits & vegs, eat fewer desserts and sweets, avoid processed foods, will try new grains like brown rice, quinoa, farro, drink more water and keep added daily sugar <25g/day  Readiness to change: Action:  (Changing behavior)      PSYCHOSOCIAL ASSESSMENT AND PLAN    Emotional:  Depression assessment:  PHQ-9 = 1-4 = Minimal Depression            Anxiety measure:  KATE-7 = 0-4  = Not anxious  Self-reported stress level:  5  Social support: Very Good    Goals:  Reduce perceived stress to 1-3/10, improved [facility] QOL < 27, PHQ-9 - reduced severity by one level, continue medical therapy, Physical Fitness in [facility] Score < 3, Social Support in [facility] Score < 3, Daily Activity in [facility] Score < 3, Overall Health in [facility] Score < 3, Quality of Life in [facility] Score < 3 , Change in Health in [place] Score < 3  and improved positive thoughts of well being    Progression Toward Goals: Patient will be encouraged to focus on lifestyle modifications following discharge      Education: signs/sxs of depression, benefits of a positive support system, stress management techniques and class:  Stress and Your Health   Plan: Class: Stress and Your Health, Class: Relaxation, Practice relaxation techniques and Exercise  Readiness to change: Action:  (Changing behavior)      OTHER CORE COMPONENTS     Tobacco:   Social History     Tobacco Use   Smoking Status Never Smoker   Smokeless Tobacco Never Used       Tobacco Use Intervention:   N/A:  Patient is a non-smoker     Anginal Symptoms:  None   NTG use: No prescription    Blood pressure: Patient's resting BP has not been consistently < 130/80   Restin/56   Exercise: 134/64   Recovery: 120/58    Goals: consistent BP < 130/80, reduced dietary sodium <2300mg, moderate intensity exercise >150 mins/wk and medication compliance    Progression Toward Goals: Goals met: Resting BPs consistently < 130/80 , Patient will be encouraged to focus on lifestyle modifications following discharge      Education:  understanding high blood pressure and it's relationship to CAD, low sodium diet and HTN, Education class:  Common Heart Medications and Education class: Understanding Heart Disease  Plan: Class: Understanding Heart Disease and Class: Common Heart Medications  Readiness to change: Action:  (Changing behavior)

## 2021-06-21 ENCOUNTER — APPOINTMENT (OUTPATIENT)
Dept: CARDIAC REHAB | Facility: HOSPITAL | Age: 66
End: 2021-06-21
Payer: COMMERCIAL

## 2021-06-23 ENCOUNTER — APPOINTMENT (OUTPATIENT)
Dept: CARDIAC REHAB | Facility: HOSPITAL | Age: 66
End: 2021-06-23
Payer: COMMERCIAL

## 2021-06-25 ENCOUNTER — APPOINTMENT (OUTPATIENT)
Dept: CARDIAC REHAB | Facility: HOSPITAL | Age: 66
End: 2021-06-25
Payer: COMMERCIAL

## 2021-08-24 ENCOUNTER — TELEPHONE (OUTPATIENT)
Dept: NEUROLOGY | Facility: CLINIC | Age: 66
End: 2021-08-24

## 2021-08-24 NOTE — TELEPHONE ENCOUNTER
8/24 DR CHARLES, PT'S PCP, IS NON-PAR W/ [facility] - PER RAMIRO NEURO WILL STLL SEE PT 8/26 BUT PHYS ORDERS WILL BE REQUIRED FOR FUTURE VISITS    PT MADE AWARE & WILL ESTABLISH W/ NEW PCP

## 2021-08-27 DIAGNOSIS — J45.909 UNCOMPLICATED ASTHMA, UNSPECIFIED ASTHMA SEVERITY, UNSPECIFIED WHETHER PERSISTENT: ICD-10-CM

## 2021-08-27 RX ORDER — BUDESONIDE AND FORMOTEROL FUMARATE DIHYDRATE 160; 4.5 UG/1; UG/1
AEROSOL RESPIRATORY (INHALATION)
Qty: 10.2 G | Refills: 0 | Status: SHIPPED | OUTPATIENT
Start: 2021-08-27 | End: 2021-10-25

## 2021-09-09 ENCOUNTER — VBI (OUTPATIENT)
Dept: ADMINISTRATIVE | Facility: OTHER | Age: 66
End: 2021-09-09

## 2021-10-19 DIAGNOSIS — I10 ESSENTIAL HYPERTENSION: ICD-10-CM

## 2021-10-20 RX ORDER — LOSARTAN POTASSIUM 50 MG/1
50 TABLET ORAL DAILY
Qty: 30 TABLET | Refills: 0 | Status: SHIPPED | OUTPATIENT
Start: 2021-10-20 | End: 2022-03-28

## 2021-10-22 DIAGNOSIS — J45.909 UNCOMPLICATED ASTHMA, UNSPECIFIED ASTHMA SEVERITY, UNSPECIFIED WHETHER PERSISTENT: ICD-10-CM

## 2021-10-25 RX ORDER — BUDESONIDE AND FORMOTEROL FUMARATE DIHYDRATE 160; 4.5 UG/1; UG/1
AEROSOL RESPIRATORY (INHALATION)
Qty: 10.2 G | Refills: 0 | Status: SHIPPED | OUTPATIENT
Start: 2021-10-25 | End: 2021-12-27

## 2021-11-30 DIAGNOSIS — I25.10 CORONARY ARTERY DISEASE INVOLVING NATIVE CORONARY ARTERY OF NATIVE HEART WITHOUT ANGINA PECTORIS: ICD-10-CM

## 2021-11-30 DIAGNOSIS — I10 ESSENTIAL HYPERTENSION: ICD-10-CM

## 2021-11-30 RX ORDER — METOPROLOL SUCCINATE 25 MG/1
TABLET, EXTENDED RELEASE ORAL
Qty: 30 TABLET | Refills: 0 | Status: SHIPPED | OUTPATIENT
Start: 2021-11-30 | End: 2022-01-25

## 2021-12-27 DIAGNOSIS — J45.909 UNCOMPLICATED ASTHMA, UNSPECIFIED ASTHMA SEVERITY, UNSPECIFIED WHETHER PERSISTENT: ICD-10-CM

## 2021-12-27 RX ORDER — BUDESONIDE AND FORMOTEROL FUMARATE DIHYDRATE 160; 4.5 UG/1; UG/1
AEROSOL RESPIRATORY (INHALATION)
Qty: 10.2 G | Refills: 0 | Status: SHIPPED | OUTPATIENT
Start: 2021-12-27 | End: 2022-02-09

## 2021-12-28 ENCOUNTER — VBI (OUTPATIENT)
Dept: ADMINISTRATIVE | Facility: OTHER | Age: 66
End: 2021-12-28

## 2022-01-25 DIAGNOSIS — I10 ESSENTIAL HYPERTENSION: ICD-10-CM

## 2022-01-25 DIAGNOSIS — I25.10 CORONARY ARTERY DISEASE INVOLVING NATIVE CORONARY ARTERY OF NATIVE HEART WITHOUT ANGINA PECTORIS: ICD-10-CM

## 2022-01-25 RX ORDER — METOPROLOL SUCCINATE 25 MG/1
TABLET, EXTENDED RELEASE ORAL
Qty: 30 TABLET | Refills: 0 | Status: SHIPPED | OUTPATIENT
Start: 2022-01-25 | End: 2022-03-28

## 2022-02-09 DIAGNOSIS — J45.909 UNCOMPLICATED ASTHMA, UNSPECIFIED ASTHMA SEVERITY, UNSPECIFIED WHETHER PERSISTENT: ICD-10-CM

## 2022-02-09 RX ORDER — BUDESONIDE AND FORMOTEROL FUMARATE DIHYDRATE 160; 4.5 UG/1; UG/1
AEROSOL RESPIRATORY (INHALATION)
Qty: 10.2 G | Refills: 0 | Status: SHIPPED | OUTPATIENT
Start: 2022-02-09 | End: 2022-03-28

## 2022-03-28 DIAGNOSIS — Z95.1 S/P CABG (CORONARY ARTERY BYPASS GRAFT): ICD-10-CM

## 2022-03-28 DIAGNOSIS — I25.10 CORONARY ARTERY DISEASE INVOLVING NATIVE CORONARY ARTERY OF NATIVE HEART WITHOUT ANGINA PECTORIS: ICD-10-CM

## 2022-03-28 DIAGNOSIS — I10 ESSENTIAL HYPERTENSION: ICD-10-CM

## 2022-03-28 DIAGNOSIS — J45.909 UNCOMPLICATED ASTHMA, UNSPECIFIED ASTHMA SEVERITY, UNSPECIFIED WHETHER PERSISTENT: ICD-10-CM

## 2022-03-28 RX ORDER — TICAGRELOR 90 MG/1
TABLET ORAL
Qty: 60 TABLET | Refills: 0 | Status: ON HOLD | OUTPATIENT
Start: 2022-03-28 | End: 2022-06-16 | Stop reason: CLARIF

## 2022-03-28 RX ORDER — BUDESONIDE AND FORMOTEROL FUMARATE DIHYDRATE 160; 4.5 UG/1; UG/1
AEROSOL RESPIRATORY (INHALATION)
Qty: 10.2 G | Refills: 0 | Status: SHIPPED | OUTPATIENT
Start: 2022-03-28

## 2022-03-28 RX ORDER — METOPROLOL SUCCINATE 25 MG/1
TABLET, EXTENDED RELEASE ORAL
Qty: 30 TABLET | Refills: 0 | Status: ON HOLD | OUTPATIENT
Start: 2022-03-28 | End: 2022-06-16 | Stop reason: CLARIF

## 2022-03-28 RX ORDER — LOSARTAN POTASSIUM 50 MG/1
50 TABLET ORAL DAILY
Qty: 30 TABLET | Refills: 0 | Status: SHIPPED | OUTPATIENT
Start: 2022-03-28 | End: 2022-06-21

## 2022-05-18 DIAGNOSIS — I10 ESSENTIAL HYPERTENSION: ICD-10-CM

## 2022-05-18 DIAGNOSIS — I25.10 CORONARY ARTERY DISEASE INVOLVING NATIVE CORONARY ARTERY OF NATIVE HEART WITHOUT ANGINA PECTORIS: ICD-10-CM

## 2022-05-18 DIAGNOSIS — J45.909 UNCOMPLICATED ASTHMA, UNSPECIFIED ASTHMA SEVERITY, UNSPECIFIED WHETHER PERSISTENT: ICD-10-CM

## 2022-05-18 DIAGNOSIS — Z95.1 S/P CABG (CORONARY ARTERY BYPASS GRAFT): ICD-10-CM

## 2022-05-18 RX ORDER — BUDESONIDE AND FORMOTEROL FUMARATE DIHYDRATE 160; 4.5 UG/1; UG/1
AEROSOL RESPIRATORY (INHALATION)
Qty: 10.2 G | Refills: 0 | OUTPATIENT
Start: 2022-05-18

## 2022-05-18 RX ORDER — LOSARTAN POTASSIUM 50 MG/1
50 TABLET ORAL DAILY
Qty: 30 TABLET | Refills: 0 | OUTPATIENT
Start: 2022-05-18

## 2022-05-18 RX ORDER — TICAGRELOR 90 MG/1
TABLET ORAL
Qty: 60 TABLET | Refills: 0 | OUTPATIENT
Start: 2022-05-18

## 2022-05-18 NOTE — TELEPHONE ENCOUNTER
Patient has not been seen since 2021  He has either no showed or cancelled appointments that were made  Brilinta was discontinued in April 2020, one year post stent

## 2022-06-15 ENCOUNTER — HOSPITAL ENCOUNTER (EMERGENCY)
Facility: HOSPITAL | Age: 67
Discharge: NURSING, REHAB OR LONG-TERM CARE | End: 2022-06-16
Attending: EMERGENCY MEDICINE | Admitting: EMERGENCY MEDICINE
Payer: COMMERCIAL

## 2022-06-15 ENCOUNTER — APPOINTMENT (EMERGENCY)
Dept: CT IMAGING | Facility: HOSPITAL | Age: 67
End: 2022-06-15
Payer: COMMERCIAL

## 2022-06-15 DIAGNOSIS — G45.9 TIA (TRANSIENT ISCHEMIC ATTACK): ICD-10-CM

## 2022-06-15 DIAGNOSIS — E04.2 MULTIPLE THYROID NODULES: ICD-10-CM

## 2022-06-15 DIAGNOSIS — I65.22 STENOSIS OF LEFT CAROTID ARTERY: Primary | ICD-10-CM

## 2022-06-15 DIAGNOSIS — I16.0 HYPERTENSIVE URGENCY: ICD-10-CM

## 2022-06-15 LAB
ALBUMIN SERPL BCP-MCNC: 3.8 G/DL (ref 3.5–5)
ALP SERPL-CCNC: 81 U/L (ref 46–116)
ALT SERPL W P-5'-P-CCNC: 27 U/L (ref 12–78)
ANION GAP SERPL CALCULATED.3IONS-SCNC: 7 MMOL/L (ref 4–13)
APTT PPP: 33 SECONDS (ref 23–37)
AST SERPL W P-5'-P-CCNC: 18 U/L (ref 5–45)
BASOPHILS # BLD AUTO: 0.04 THOUSANDS/ΜL (ref 0–0.1)
BASOPHILS NFR BLD AUTO: 1 % (ref 0–1)
BILIRUB SERPL-MCNC: 1.53 MG/DL (ref 0.2–1)
BUN SERPL-MCNC: 21 MG/DL (ref 5–25)
CALCIUM SERPL-MCNC: 8.8 MG/DL (ref 8.3–10.1)
CHLORIDE SERPL-SCNC: 103 MMOL/L (ref 100–108)
CHOLEST SERPL-MCNC: 211 MG/DL
CO2 SERPL-SCNC: 30 MMOL/L (ref 21–32)
CREAT SERPL-MCNC: 1.44 MG/DL (ref 0.6–1.3)
EOSINOPHIL # BLD AUTO: 0.18 THOUSAND/ΜL (ref 0–0.61)
EOSINOPHIL NFR BLD AUTO: 3 % (ref 0–6)
ERYTHROCYTE [DISTWIDTH] IN BLOOD BY AUTOMATED COUNT: 13 % (ref 11.6–15.1)
GFR SERPL CREATININE-BSD FRML MDRD: 49 ML/MIN/1.73SQ M
GLUCOSE SERPL-MCNC: 159 MG/DL (ref 65–140)
GLUCOSE SERPL-MCNC: 162 MG/DL (ref 65–140)
HCT VFR BLD AUTO: 46.1 % (ref 36.5–49.3)
HDLC SERPL-MCNC: 42 MG/DL
HGB BLD-MCNC: 14.8 G/DL (ref 12–17)
IMM GRANULOCYTES # BLD AUTO: 0.01 THOUSAND/UL (ref 0–0.2)
IMM GRANULOCYTES NFR BLD AUTO: 0 % (ref 0–2)
INR PPP: 0.96 (ref 0.84–1.19)
LDLC SERPL CALC-MCNC: 123 MG/DL (ref 0–100)
LYMPHOCYTES # BLD AUTO: 1.46 THOUSANDS/ΜL (ref 0.6–4.47)
LYMPHOCYTES NFR BLD AUTO: 21 % (ref 14–44)
MCH RBC QN AUTO: 29.1 PG (ref 26.8–34.3)
MCHC RBC AUTO-ENTMCNC: 32.1 G/DL (ref 31.4–37.4)
MCV RBC AUTO: 91 FL (ref 82–98)
MONOCYTES # BLD AUTO: 0.62 THOUSAND/ΜL (ref 0.17–1.22)
MONOCYTES NFR BLD AUTO: 9 % (ref 4–12)
NEUTROPHILS # BLD AUTO: 4.72 THOUSANDS/ΜL (ref 1.85–7.62)
NEUTS SEG NFR BLD AUTO: 66 % (ref 43–75)
NRBC BLD AUTO-RTO: 0 /100 WBCS
NT-PROBNP SERPL-MCNC: 166 PG/ML
PLATELET # BLD AUTO: 208 THOUSANDS/UL (ref 149–390)
PMV BLD AUTO: 9.6 FL (ref 8.9–12.7)
POTASSIUM SERPL-SCNC: 3.3 MMOL/L (ref 3.5–5.3)
PROT SERPL-MCNC: 6.9 G/DL (ref 6.4–8.2)
PROTHROMBIN TIME: 12.3 SECONDS (ref 11.6–14.5)
RBC # BLD AUTO: 5.08 MILLION/UL (ref 3.88–5.62)
SODIUM SERPL-SCNC: 140 MMOL/L (ref 136–145)
TRIGL SERPL-MCNC: 230 MG/DL
WBC # BLD AUTO: 7.03 THOUSAND/UL (ref 4.31–10.16)

## 2022-06-15 PROCEDURE — 93005 ELECTROCARDIOGRAM TRACING: CPT

## 2022-06-15 PROCEDURE — 99285 EMERGENCY DEPT VISIT HI MDM: CPT

## 2022-06-15 PROCEDURE — 80061 LIPID PANEL: CPT | Performed by: EMERGENCY MEDICINE

## 2022-06-15 PROCEDURE — 85610 PROTHROMBIN TIME: CPT | Performed by: EMERGENCY MEDICINE

## 2022-06-15 PROCEDURE — 96374 THER/PROPH/DIAG INJ IV PUSH: CPT

## 2022-06-15 PROCEDURE — 85025 COMPLETE CBC W/AUTO DIFF WBC: CPT | Performed by: EMERGENCY MEDICINE

## 2022-06-15 PROCEDURE — 70496 CT ANGIOGRAPHY HEAD: CPT

## 2022-06-15 PROCEDURE — 82948 REAGENT STRIP/BLOOD GLUCOSE: CPT

## 2022-06-15 PROCEDURE — 36415 COLL VENOUS BLD VENIPUNCTURE: CPT | Performed by: EMERGENCY MEDICINE

## 2022-06-15 PROCEDURE — 99285 EMERGENCY DEPT VISIT HI MDM: CPT | Performed by: EMERGENCY MEDICINE

## 2022-06-15 PROCEDURE — 80053 COMPREHEN METABOLIC PANEL: CPT | Performed by: EMERGENCY MEDICINE

## 2022-06-15 PROCEDURE — 70498 CT ANGIOGRAPHY NECK: CPT

## 2022-06-15 PROCEDURE — 83880 ASSAY OF NATRIURETIC PEPTIDE: CPT | Performed by: EMERGENCY MEDICINE

## 2022-06-15 PROCEDURE — 85730 THROMBOPLASTIN TIME PARTIAL: CPT | Performed by: EMERGENCY MEDICINE

## 2022-06-15 PROCEDURE — 83036 HEMOGLOBIN GLYCOSYLATED A1C: CPT | Performed by: EMERGENCY MEDICINE

## 2022-06-15 PROCEDURE — G1004 CDSM NDSC: HCPCS

## 2022-06-15 RX ORDER — LABETALOL HYDROCHLORIDE 5 MG/ML
10 INJECTION, SOLUTION INTRAVENOUS ONCE
Status: COMPLETED | OUTPATIENT
Start: 2022-06-15 | End: 2022-06-15

## 2022-06-15 RX ADMIN — LABETALOL HYDROCHLORIDE 10 MG: 5 INJECTION, SOLUTION INTRAVENOUS at 21:54

## 2022-06-15 RX ADMIN — IOHEXOL 65 ML: 350 INJECTION, SOLUTION INTRAVENOUS at 22:21

## 2022-06-16 ENCOUNTER — APPOINTMENT (INPATIENT)
Dept: NON INVASIVE DIAGNOSTICS | Facility: HOSPITAL | Age: 67
DRG: 039 | End: 2022-06-16
Payer: COMMERCIAL

## 2022-06-16 ENCOUNTER — HOSPITAL ENCOUNTER (INPATIENT)
Facility: HOSPITAL | Age: 67
LOS: 5 days | Discharge: HOME | DRG: 039 | End: 2022-06-21
Attending: INTERNAL MEDICINE | Admitting: FAMILY MEDICINE
Payer: COMMERCIAL

## 2022-06-16 VITALS
HEART RATE: 61 BPM | RESPIRATION RATE: 17 BRPM | SYSTOLIC BLOOD PRESSURE: 177 MMHG | WEIGHT: 223.33 LBS | BODY MASS INDEX: 27.18 KG/M2 | TEMPERATURE: 97.5 F | DIASTOLIC BLOOD PRESSURE: 74 MMHG | OXYGEN SATURATION: 97 %

## 2022-06-16 DIAGNOSIS — I65.29 STENOSIS OF CAROTID ARTERY, UNSPECIFIED LATERALITY: ICD-10-CM

## 2022-06-16 DIAGNOSIS — I10 ESSENTIAL HYPERTENSION: ICD-10-CM

## 2022-06-16 DIAGNOSIS — I65.22 STENOSIS OF LEFT CAROTID ARTERY: ICD-10-CM

## 2022-06-16 DIAGNOSIS — I63.9 ACUTE CVA (CEREBROVASCULAR ACCIDENT) (HCC): ICD-10-CM

## 2022-06-16 DIAGNOSIS — I65.22 STENOSIS OF LEFT INTERNAL CAROTID ARTERY: ICD-10-CM

## 2022-06-16 DIAGNOSIS — I63.9 STROKE (HCC): ICD-10-CM

## 2022-06-16 DIAGNOSIS — G45.9 TIA (TRANSIENT ISCHEMIC ATTACK): Primary | ICD-10-CM

## 2022-06-16 PROBLEM — E04.2 MULTIPLE THYROID NODULES: Status: ACTIVE | Noted: 2022-06-16

## 2022-06-16 PROBLEM — R79.89 ELEVATED SERUM CREATININE: Status: ACTIVE | Noted: 2022-06-16

## 2022-06-16 PROBLEM — E04.1 THYROID NODULE: Status: ACTIVE | Noted: 2022-06-16

## 2022-06-16 PROBLEM — I48.91 ATRIAL FIBRILLATION (HCC): Status: ACTIVE | Noted: 2022-06-16

## 2022-06-16 LAB
AORTIC ROOT: 3.5 CM
APICAL FOUR CHAMBER EJECTION FRACTION: 62 %
ASCENDING AORTA: 3.3 CM
ATRIAL RATE: 66 BPM
CARDIAC TROPONIN I PNL SERPL HS: 6 NG/L
CHOLEST SERPL-MCNC: 207 MG/DL
CHOLEST SERPL-MCNC: 211 MG/DL
DOP CALC LVOT AREA: 3.8 CM2
DOP CALC LVOT DIAMETER: 2.2 CM
E WAVE DECELERATION TIME: 278 MS
EST. AVERAGE GLUCOSE BLD GHB EST-MCNC: 126 MG/DL
EST. AVERAGE GLUCOSE BLD GHB EST-MCNC: 128 MG/DL
FRACTIONAL SHORTENING: 38 (ref 28–44)
HBA1C MFR BLD: 6 %
HBA1C MFR BLD: 6.1 %
HDLC SERPL-MCNC: 47 MG/DL
HDLC SERPL-MCNC: 47 MG/DL
INTERVENTRICULAR SEPTUM IN DIASTOLE (PARASTERNAL SHORT AXIS VIEW): 1.7 CM
INTERVENTRICULAR SEPTUM: 1.7 CM (ref 0.6–1.1)
LAAS-AP2: 19.1 CM2
LAAS-AP4: 15.9 CM2
LDLC SERPL CALC-MCNC: 130 MG/DL (ref 0–100)
LDLC SERPL CALC-MCNC: 136 MG/DL (ref 0–100)
LEFT ATRIUM SIZE: 4.1 CM
LEFT INTERNAL DIMENSION IN SYSTOLE: 2.4 CM (ref 2.1–4)
LEFT VENTRICULAR INTERNAL DIMENSION IN DIASTOLE: 3.9 CM (ref 3.5–6)
LEFT VENTRICULAR POSTERIOR WALL IN END DIASTOLE: 1.8 CM
LEFT VENTRICULAR STROKE VOLUME: 47 ML
LVSV (TEICH): 47 ML
MV E'TISSUE VEL-SEP: 6 CM/S
MV PEAK A VEL: 0.94 M/S
MV PEAK E VEL: 71 CM/S
MV STENOSIS PRESSURE HALF TIME: 81 MS
MV VALVE AREA P 1/2 METHOD: 2.72
NONHDLC SERPL-MCNC: 160 MG/DL
P AXIS: 76 DEGREES
PLATELET # BLD AUTO: 202 THOUSANDS/UL (ref 149–390)
PMV BLD AUTO: 10.3 FL (ref 8.9–12.7)
PR INTERVAL: 154 MS
QRS AXIS: 112 DEGREES
QRSD INTERVAL: 104 MS
QT INTERVAL: 424 MS
QTC INTERVAL: 444 MS
RIGHT ATRIUM AREA SYSTOLE A4C: 20 CM2
RIGHT VENTRICLE ID DIMENSION: 3.8 CM
SL CV LEFT ATRIUM LENGTH A2C: 6.4 CM
SL CV LV EF: 62
SL CV PED ECHO LEFT VENTRICLE DIASTOLIC VOLUME (MOD BIPLANE) 2D: 67 ML
SL CV PED ECHO LEFT VENTRICLE SYSTOLIC VOLUME (MOD BIPLANE) 2D: 20 ML
T WAVE AXIS: -3 DEGREES
TRIGL SERPL-MCNC: 142 MG/DL
TRIGL SERPL-MCNC: 152 MG/DL
TSH SERPL DL<=0.05 MIU/L-ACNC: 2.93 UIU/ML (ref 0.45–4.5)
VENTRICULAR RATE: 66 BPM

## 2022-06-16 PROCEDURE — 99221 1ST HOSP IP/OBS SF/LOW 40: CPT | Performed by: SURGERY

## 2022-06-16 PROCEDURE — 80061 LIPID PANEL: CPT | Performed by: INTERNAL MEDICINE

## 2022-06-16 PROCEDURE — 99232 SBSQ HOSP IP/OBS MODERATE 35: CPT | Performed by: INTERNAL MEDICINE

## 2022-06-16 PROCEDURE — 84484 ASSAY OF TROPONIN QUANT: CPT | Performed by: EMERGENCY MEDICINE

## 2022-06-16 PROCEDURE — 93010 ELECTROCARDIOGRAM REPORT: CPT | Performed by: INTERNAL MEDICINE

## 2022-06-16 PROCEDURE — 99223 1ST HOSP IP/OBS HIGH 75: CPT | Performed by: PSYCHIATRY & NEUROLOGY

## 2022-06-16 PROCEDURE — 93880 EXTRACRANIAL BILAT STUDY: CPT

## 2022-06-16 PROCEDURE — 85049 AUTOMATED PLATELET COUNT: CPT | Performed by: INTERNAL MEDICINE

## 2022-06-16 PROCEDURE — 93306 TTE W/DOPPLER COMPLETE: CPT

## 2022-06-16 PROCEDURE — 84443 ASSAY THYROID STIM HORMONE: CPT | Performed by: INTERNAL MEDICINE

## 2022-06-16 PROCEDURE — 97166 OT EVAL MOD COMPLEX 45 MIN: CPT

## 2022-06-16 PROCEDURE — 83036 HEMOGLOBIN GLYCOSYLATED A1C: CPT | Performed by: INTERNAL MEDICINE

## 2022-06-16 PROCEDURE — 99223 1ST HOSP IP/OBS HIGH 75: CPT | Performed by: NURSE PRACTITIONER

## 2022-06-16 PROCEDURE — 96372 THER/PROPH/DIAG INJ SC/IM: CPT

## 2022-06-16 PROCEDURE — 99223 1ST HOSP IP/OBS HIGH 75: CPT | Performed by: INTERNAL MEDICINE

## 2022-06-16 PROCEDURE — 36415 COLL VENOUS BLD VENIPUNCTURE: CPT | Performed by: EMERGENCY MEDICINE

## 2022-06-16 PROCEDURE — 97162 PT EVAL MOD COMPLEX 30 MIN: CPT

## 2022-06-16 RX ORDER — ATORVASTATIN CALCIUM 40 MG/1
40 TABLET, FILM COATED ORAL EVERY EVENING
Status: DISCONTINUED | OUTPATIENT
Start: 2022-06-16 | End: 2022-06-16

## 2022-06-16 RX ORDER — SODIUM CHLORIDE 9 MG/ML
75 INJECTION, SOLUTION INTRAVENOUS CONTINUOUS
Status: DISCONTINUED | OUTPATIENT
Start: 2022-06-17 | End: 2022-06-18

## 2022-06-16 RX ORDER — LABETALOL HYDROCHLORIDE 5 MG/ML
10 INJECTION, SOLUTION INTRAVENOUS ONCE AS NEEDED
Status: DISCONTINUED | OUTPATIENT
Start: 2022-06-16 | End: 2022-06-16 | Stop reason: HOSPADM

## 2022-06-16 RX ORDER — ASPIRIN 81 MG/1
81 TABLET ORAL DAILY
Status: DISCONTINUED | OUTPATIENT
Start: 2022-06-16 | End: 2022-06-21 | Stop reason: HOSPADM

## 2022-06-16 RX ORDER — ASPIRIN 81 MG/1
81 TABLET, CHEWABLE ORAL DAILY
Status: DISCONTINUED | OUTPATIENT
Start: 2022-06-16 | End: 2022-06-16

## 2022-06-16 RX ORDER — ATORVASTATIN CALCIUM 80 MG/1
80 TABLET, FILM COATED ORAL EVERY EVENING
Status: DISCONTINUED | OUTPATIENT
Start: 2022-06-16 | End: 2022-06-21 | Stop reason: HOSPADM

## 2022-06-16 RX ORDER — CEFAZOLIN SODIUM 2 G/50ML
2000 SOLUTION INTRAVENOUS ONCE
Status: CANCELLED | OUTPATIENT
Start: 2022-06-16 | End: 2022-06-16

## 2022-06-16 RX ORDER — ATORVASTATIN CALCIUM 80 MG/1
80 TABLET, FILM COATED ORAL
Status: DISCONTINUED | OUTPATIENT
Start: 2022-06-16 | End: 2022-06-16

## 2022-06-16 RX ORDER — CARVEDILOL 6.25 MG/1
6.25 TABLET ORAL 2 TIMES DAILY WITH MEALS
Status: DISCONTINUED | OUTPATIENT
Start: 2022-06-16 | End: 2022-06-20

## 2022-06-16 RX ORDER — CHLORHEXIDINE GLUCONATE 0.12 MG/ML
15 RINSE ORAL ONCE
Status: COMPLETED | OUTPATIENT
Start: 2022-06-16 | End: 2022-06-18

## 2022-06-16 RX ORDER — CARVEDILOL PHOSPHATE 40 MG/1
40 CAPSULE, EXTENDED RELEASE ORAL DAILY
COMMUNITY
End: 2022-07-28 | Stop reason: ALTCHOICE

## 2022-06-16 RX ORDER — ENOXAPARIN SODIUM 100 MG/ML
1 INJECTION SUBCUTANEOUS ONCE
Status: COMPLETED | OUTPATIENT
Start: 2022-06-16 | End: 2022-06-16

## 2022-06-16 RX ORDER — HEPARIN SODIUM 5000 [USP'U]/ML
5000 INJECTION, SOLUTION INTRAVENOUS; SUBCUTANEOUS EVERY 8 HOURS SCHEDULED
Status: DISCONTINUED | OUTPATIENT
Start: 2022-06-16 | End: 2022-06-18

## 2022-06-16 RX ADMIN — CARVEDILOL 6.25 MG: 6.25 TABLET, FILM COATED ORAL at 11:02

## 2022-06-16 RX ADMIN — TICAGRELOR 90 MG: 90 TABLET ORAL at 11:02

## 2022-06-16 RX ADMIN — ENOXAPARIN SODIUM 100 MG: 100 INJECTION SUBCUTANEOUS at 00:27

## 2022-06-16 RX ADMIN — ASPIRIN 81 MG: 81 TABLET, COATED ORAL at 08:40

## 2022-06-16 RX ADMIN — TICAGRELOR 90 MG: 90 TABLET ORAL at 21:21

## 2022-06-16 RX ADMIN — ATORVASTATIN CALCIUM 40 MG: 40 TABLET, FILM COATED ORAL at 08:40

## 2022-06-16 RX ADMIN — CARVEDILOL 6.25 MG: 6.25 TABLET, FILM COATED ORAL at 18:11

## 2022-06-16 RX ADMIN — ATORVASTATIN CALCIUM 80 MG: 80 TABLET, FILM COATED ORAL at 18:10

## 2022-06-16 NOTE — ASSESSMENT & PLAN NOTE
PAF, not typically on [facility]  · Reportedly was recently switched from lopressor to coreg, continue coreg for now  · Telemetry  · [facility] recs per cards   Is on DAPT currently

## 2022-06-16 NOTE — SPEECH THERAPY NOTE
Speech/Language Pathology Progress Note    Patient Name: Jose Ramon De La Paz  ECYWC'T Date: 6/16/2022     Consult received and chart reviewed  Per chart review and discussion with RN, pt passed RN dysphagia assessment and is tolerating regular diet with thin liquids with no s/s of aspiration  Speech/Language deficits also denied  Formal speech/swallow evaluation does not appear to be indicated at this time  If new concerns arise, please reconsult  Thank you

## 2022-06-16 NOTE — PHYSICAL THERAPY NOTE
Physical Therapy Evaluation    Patient Name: Jackie Villatoro    LWGBB'C Date: 6/16/2022     Problem List  Principal Problem:    TIA/Stroke  Active Problems:    S/P CABG (coronary artery bypass graft)    Mixed hyperlipidemia    Coronary artery disease involving native coronary artery of native heart without angina pectoris    Essential hypertension    Carotid artery stenosis    Thyroid nodule    Atrial fibrillation (HCC)    Elevated serum creatinine       Past Medical History  Past Medical History:   Diagnosis Date    Asthma     Atrial fibrillation ([facility] [address] )     Coronary artery disease     HTN (hypertension)     Hyperlipidemia     Myocardial infarction ([facility] [address] ) 02/2019    Stroke [facility])     TIA (transient ischemic attack)         Past Surgical History  Past Surgical History:   Procedure Laterality Date    APPENDECTOMY      CARDIAC CATHETERIZATION  04/17/2020    RCA: 80% distal lesion  LCX/OM2 stent placement  LAD with 70-80% lesion at D1 bifurcation  CARDIAC CATHETERIZATION  2019    OM2 and proximal LCX stenting      CATARACT EXTRACTION Right     CORONARY ARTERY BYPASS GRAFT      CORONARY STENT PLACEMENT  2019    OM2 and proximal LCX stenting           06/16/22 1005   PT Last Visit   PT Visit Date 06/16/22   Note Type   Note type Evaluation   Pain Assessment   Pain Assessment Tool 0-10   Pain Score No Pain   Restrictions/Precautions   Weight Bearing Precautions Per Order No   Other Precautions   (HTN, SBP ~160)   Home Living   Type of Home House   Home Layout Two level;Stairs to enter with rails;Bed/bath upstairs  (3 YOLANDA, full flight to 2nd floro bed/bath)   Bathroom Shower/Tub Tub/shower unit   Bathroom Toilet Standard   Bathroom Equipment Grab bars in shower   Additional Comments pt denies any DME or AD use PTA   Prior Function   Level of [place] Independent with ADLs and functional mobility   Lives With Spouse  (vs alone)   Receives Help From Family   ADL Assistance Independent   IADLs Independent   Falls in the last 6 months 0   Vocational Full time employment  ()   Comments pt reports living with spouse in above home set up and also alone in 2nd home closer to work during the week  General   Additional Pertinent History per chart review, pt has not been taking BP meds for months   Family/Caregiver Present No   Cognition   Overall Cognitive Status WFL   Arousal/Participation Cooperative   Orientation Level Oriented X4   Memory Within functional limits   Following Commands Follows all commands and directions without difficulty   Comments very pleasant to work with   RLE Assessment   RLE Assessment WFL   LLE Assessment   LLE Assessment WFL   Light Touch   RLE Light Touch Grossly intact   LLE Light Touch Grossly intact   Bed Mobility   Supine to Sit 6  Modified independent   Sit to Supine 6  Modified independent   Transfers   Sit to Stand 6  Modified independent   Stand to Sit 6  Modified independent   Stand pivot 6  Modified independent   Additional Comments no AD utilized   Ambulation/Elevation   Gait pattern Decreased foot clearance; Short stride; WNL   Gait Assistance 6  Modified independent   Assistive Device None   Distance 75' throughout room and hallway   Stair Management Assistance Not tested  (denies concerns  Marched with no UE support and appropriate foot clearance with S)   Ambulation/Elevation Additional Comments no LOB noted; tandem walking x10' with S and no LOB noted   Endurance Deficit   Endurance Deficit No   Activity Tolerance   Activity Tolerance Patient tolerated treatment well; Other (Comment)  (limited by HTN (209/105)-RN cleared to mobilize)   Medical Staff Made Aware OT, elenita 2* medical complexity   Nurse Made Aware pt cleared to see and mobilize per nursing   Assessment   Prognosis Good   Assessment Pt is a 79 y o  male [facility] and [facility] on 6/16/2022 with transient neurologic deficits including R leg numbness (Below knee) lasting 1 hours day prior, difficulty with speech and R arm numbness (lasted 15 min and gradually improved)  Per chart review, pt with 70-99% stenosis of L ICA and recommended for carotid endartectomy  Pt received a primary medical dx of TIA/stroke  Pt has the following comorbidities which affect their treatment: asthma, CAD, h/o CABG, HTN, HLD, a-fib, TIA, stroke,  as well as personal factors including stairs to access home and being +home alone at times  Pt has a moderate complexity clinical presentation due to Ongoing medical management for primary dx, Decreased activity tolerance compared to baseline, Fall risk, Ongoing telemetry monitoring, Trending lab values, Diagnostic imaging pending, Continuous pulse oximetry monitoring  , and PMH  PT was consulted to evaluate pt's functional mobility and discharge needs  Upon evaluation, patient required mod I for bed mobility, mod I for STS transfers, mod I for ambulation, and S for static marching to mimic stair training  Eval limited by HTN (209/105)  Cleared to see per RN  At conclusion of eval, pt remained seated in chair with phone, call bell, and all other personal needs within reach  The patient's AM-PAC Basic Mobility Inpatient Short Form Raw Score is 23  A Raw score of greater than 16 suggests the patient may benefit from discharge to home  Please also refer to the recommendation of the Physical Therapist for safe discharge planning  At this time, pt has no further acute care PT needs 2* being mod I for all functional mobility and having a supportive wife at home who can assist as needed  Pt denies any concerns regarding their functional mobility or ability to return home safely at this time  Recommend pt continues to mobilize with nursing and restorative staff during hospital stay  Please consult with any changes in mobility status  D/C recommendations are home, no PT needs anticipated at this time     Goals   Patient Goals to go home   PT Treatment Day 0   Plan   PT Frequency Other (Comment)  (d/c acute care PT)   Recommendation   PT Discharge Recommendation No rehabilitation needs  (home with increased support from wife)   Equipment Recommended   (none)   Additional Comments pt denies any concerns regarding his current functional mobility or ability to return home safely at this time   Stefanie 8 in Bed Without Bedrails 4   Lying on Back to Sitting on Edge of Flat Bed 4   Moving Bed to Chair 4   Standing Up From Chair 4   Walk in Room 4   Climb 3-5 Stairs 3   Basic Mobility Inpatient Raw Score 23   Basic Mobility Standardized Score 50 88   Highest Level Of Mobility   -HL Goal 7: Walk 25 feet or more   -HLM Achieved 7: Walk 25 feet or more   Modified Mitchell Scale   Modified Luis Scale 1   Barthel Index   Feeding 10   Bathing 5   Grooming Score 5   Dressing Score 10   Bladder Score 10   Bowels Score 10   Toilet Use Score 10   Transfers (Bed/Chair) Score 15   Mobility (Level Surface) Score 15   Stairs Score 10   Barthel Index Score 100   Mihir Mcmahon, PT, DPT

## 2022-06-16 NOTE — ASSESSMENT & PLAN NOTE
-Tingling/loss of movement in R ring finger that improved 1 week ago   -Saw Neurologist at [facility] Monday and SBP noted to be in the 190's  -Acute onset R lower leg numbness/weakness about 30 hours ago which completely resolved after 1 hour  -At 1900 last night had entire R arm go numb and had weakness with resolution after 15 minutes  Then reported expressive aphasia which improved after 1 hour   -Denies any symptoms since   -Initially seen at [facility] ER and noted to have SBP of 200  -GCS 15 on arrival, no focal deficits on exam   -CTA head and Neck: Age indeterminate infarcts left caudate and left thalamus  Mild chronic microangiopathic changes  Suspect acute left maxillary sinusitis  Correlate clinically  No evidence of large vessel occlusion within the major vasculature of the [place]  Moderate stenosis within distal M2/perisylvian branch of the left MCA  Significant atherosclerotic disease left proximal cervical internal carotid artery resulting in severe stenosis/near total occlusion just distal to the ICA origin (greater than 90% by NASCET criteria)  Interventional/surgical consult advised  Incidental thyroid nodules for which nonemergent follow-up thyroid ultrasound is recommended  -ER staff at [facility] spoke with Neurology on call who recommended Aspirin, a single dose of Lovenox 1mg/kg and vascular surgery consult urgently; Neurology recommending permissive HTN with SBP up to 220's   -ER staff spoke with Vascular on call who reported that patient could have consult performed on AM of 6/16 and they would not perform a CEA emergently overnight  -EKG: Normal sinus rhythm, ventricular rate 66, SD interval 154, , , right axis deviation, there are Q-waves in lead V2 suggestive of age-indeterminate septal infarct, there are T-wave abnormalities in lateral precordial as well as inferior leads, there is no STEMI    -Upon arrival at [place], GCS 15 and no focal deficits on exam Plan:  >Admit to medicine on telemetry CVA pathway  Aspirin and Atorvastatin ordered  Continue pta Brillinta  Order MRI brain and 2d echocardiogram  Will put on SC heparin 5000 units for now and await opinion of Vascular team on further anticoagulation  Keep npo except for medications  >Vascular and Neurology teams consulted  >Neuro checks per CVA protocol   >order lipid panel, a1c and tsh   >Daily labs

## 2022-06-16 NOTE — ASSESSMENT & PLAN NOTE
Presented with with R arm paresthesias and slurred speech [facility], transferred to [facility] for further evaluation after being found to have severe carotid stenosis   Per neurology is TIA vs small stroke, likely from symptomatic L carotid  · CTA head and neck with and without contrast showed age indeterminate infarcts L caudate, L thalamus, no large vessel occlusion, moderate stenosis of distal M2, significant  atherosclerotic disease left proximal cervical internal carotid artery resulting in severe stenosis/near total occlusion just distal to the ICA origin   · MRI brain pending  · Goal SBP around 160 per neurology, however cautious lowering--see below   · Echo pending, carotid duplex pending  · Lipid Panel - Trig 142, Chol 211, LDL 136H  · Hemoglobin A1c - 6 0  · On DAPT, Aspirin 81 mg and Brilinta, the patient reports he was not taking Brillinta at home  · Atorvastatin 40 mg  · Telemetry ordered  · Vascular/Cards following  · PT/OT/ST

## 2022-06-16 NOTE — DISCHARGE INSTRUCTIONS
DISCHARGE INSTRUCTIONS  CAROTID ENDARTERECTOMY    ACTIVITY:  Limit your physical activity to walking for the first week and then increase your activity as tolerated  Walking up steps and normal activities may be resumed as you feel ready  Avoid strenuous activity such as vigorous exercise  Avoid heavy lifting (do not lift more than 15 pounds) for the first four weeks after surgery  You should not drive a car for at least one week following discharge from the hospital and you are off all narcotic pain medication  You may ride in a car  If you have had a stroke or mini-stroke, please consult with your neurologist prior to driving  DO NOT START OR RESUME ANY PREVIOUSLY PLANNED THERAPY (PHYSICAL, CARDIAC, REHAB, ETC) UNTIL YOU DISCUSS WITH YOUR SURGEON AND THEY FEEL IT IS SAFE TO ENGAGE IN THERAPY  DIET:  Resume your normal diet  Good nutrition is important for healing of your incision  You can expect to have a sore throat and trouble swallowing after surgery which should improve quickly  If you feel like you are choking, please call your doctor  RECURRENT SYMPTOMS: If you develop any new numbness, weakness, vision changes, drooping of the face, or difficulty with speech after discharge, CALL 911 or go to the nearest Emergency department immediately  INCISION SITE:  You may have surgical glue at your incision site  There are stitches present under the skin which will absorb on their own  The glue is used to cover the incision, assist in closure, and prevent contamination  This adhesive will darken and peel away on its own within one to two weeks  Do not pick at it  If you have a bandage, please remove this on the second day after surgery  You should shower daily  Wash incision daily with soap and water, but do not rub or scrub the incision; rinse thoroughly and pat dry  Do not bathe in a tub or swim for the first 4 weeks following surgery or if you have any open wounds    It is normal to have some bruising, swelling or discoloration around the incision  IF increasing redness, pain, bleeding or a bulge develops, call our office immediately  If you notice any active bleeding at the site, apply pressure to the site and call our office ([phone]) or [phone]  FOLLOW UP STUDIES: Doppler ultrasound studies are very important to your post-operative care  Your surgeon will arrange them at your first postoperative visit  The first study will be 3 months after surgery  FOLLOW UP APPOINTMENTS:  Making and keeping follow up appointments and ultrasound tests are important to your recovery  If you have difficulty making it to or keeping your follow up appointments, call the office  If you have increased pain, fever >101 5, increased drainage, redness or a bad smell at your surgery site, new coldness/numbness of your arm or leg, please call us immediately and GO directly to the ER  Appt juan/ Luis Goodell, PAC: 6/30/2022 at [facility] [place]      [address]  [phone]  [phone]  [address] [place] [address] , [address], [place], [facility]  [address], [address] [facility], [address] [facility]  [address], [facility], [address] [place] [address]  [address] [facility], [address] [facility], [facility], [address] [facility]  [address], [address], [address], [address] [facility], [address]  [address] [facility], [address] [facility], [address]  [address] [facility], [address] [facility], [address] [facility],[address], [address]  [address] [facility], [address]  [address] [place] [address] Kendy PEREIRAgalebronCone Health [facility]am[facility]

## 2022-06-16 NOTE — CASE MANAGEMENT
Case Management Assessment & Discharge Planning Note    Patient name Loni Benedict  Location [address] [place] [address] [facility] [id number]  : 1955 Date 2022       Current Admission Date: 2022  Current Admission Diagnosis:TIA/Stroke   Patient Active Problem List    Diagnosis Date Noted    Stroke [facility]) 2022    TIA/Stroke 2022    Carotid artery stenosis 2022    Thyroid nodule 2022    Atrial fibrillation ([address] ) 2022    Elevated serum creatinine 2022    Chest pain 2021    S/P CABG (coronary artery bypass graft)     Uncomplicated asthma 1908    Mixed hyperlipidemia 12/10/2020    Coronary artery disease involving native coronary artery of native heart without angina pectoris 12/10/2020    Essential hypertension 12/10/2020      LOS (days): 0  Geometric Mean LOS (GMLOS) (days): 2 10  Days to GMLOS:1 6     OBJECTIVE:    Risk of Unplanned Readmission Score: 8 66         Current admission status: Inpatient  Referral Reason: Stroke    Preferred Pharmacy:   [facility] [address]  [address] [place] [address]  Phone: [phone] Fax: [phone]    Primary Care Provider: No primary care provider on file  Primary Insurance:  [facility]  Secondary Insurance: [facility]    ASSESSMENT:  Active Health Care Proxies    There are no active Health Care Proxies on file  Readmission Root Cause  30 Day Readmission: No    Patient Information  Admitted from[de-identified] Home  Mental Status: Alert  During Assessment patient was accompanied by: Not accompanied during assessment  Assessment information provided by[de-identified] Patient  Primary Caregiver: Self  Support Systems: Family members  What city do you live in?: [facility] entry access options   Select all that apply : Stairs  Number of steps to enter home : 4  Do the steps have railings?: No  Type of Current Residence: [address] [place] [address] home  Upon entering residence, is there a bedroom on the main floor (no further steps)?: No  A bedroom is located on the following floor levels of residence (select all that apply):: 2nd Floor  Upon entering residence, is there a bathroom on the main floor (no further steps)?: Yes  Number of steps to 2nd floor from main floor: One Flight  In the last 12 months, was there a time when you were not able to pay the mortgage or rent on time?: No  In the last 12 months, how many places have you lived?: 1  In the last 12 months, was there a time when you did not have a steady place to sleep or slept in a shelter (including now)?: No  Homeless/housing insecurity resource given?: No  Living Arrangements: Lives w/ Spouse/significant other  Is patient a ?: No    Activities of Daily Living Prior to Admission  Functional Status: Independent  Completes ADLs independently?: Yes  Ambulates independently?: Yes  Does patient use assisted devices?: No  Does patient currently own DME?: No  Does patient have a history of Outpatient Therapy (PT/OT)?: No  Does the patient have a history of Short-Term Rehab?: No  Does patient have a history of HHC?: No  Does patient currently have [facility]?: No         Patient Information Continued  Income Source: Employed  Does patient have prescription coverage?: Yes  Within the past 12 months, you worried that your food would run out before you got the money to buy more : Never true  Within the past 12 months, the food you bought just didn't last and you didn't have money to get more : Never true  Food insecurity resource given?: N/A  Does patient receive dialysis treatments?: No  Does patient have a history of substance abuse?: No  Does patient have a history of Mental Health Diagnosis?: No    PHQ 2/9 Screening   Reviewed PHQ 2/9 Depression Screening Score?: No    Means of Transportation  Means of Transport to Appts[de-identified] Drives Self  In the past 12 months, has lack of transportation kept you from medical appointments or from getting medications?: No  In the past 12 months, has lack of transportation kept you from meetings, work, or from getting things needed for daily living?: No  Was application for public transport provided?: N/A        DISCHARGE DETAILS:    Discharge planning discussed with[de-identified] CM spoke with the pt  Freedom of Choice: Yes  Comments - Freedom of Choice: FOC explored and the pt is cleared by PT to return home without services  CM contacted family/caregiver?: No- see comments (Pt states he will update spouse)  Were Treatment Team discharge recommendations reviewed with patient/caregiver?: Yes  Did patient/caregiver verbalize understanding of patient care needs?: Yes  Were patient/caregiver advised of the risks associated with not following Treatment Team discharge recommendations?: Yes    Contacts  Reason/Outcome: Continuity of Care, Discharge [address]         Is the patient interested in [facility] at discharge?: No    DME Referral Provided  Referral made for DME?: No    Other Referral/Resources/Interventions Provided:  Interventions: None Indicated    Would you like to participate in our [address] service program?  : No - Declined    Treatment Team Recommendation: Home  Discharge Destination Plan[de-identified] Home  Transport at Discharge : Family            Additional Comments: OPEN opened in lieu of TIA dx  The pt is w/o physical/cognitive deficits s/p TIA  PT/OT evaluated and cleared the pt to return home without therapy referrals on this date  Once stable for discharge, the pt will return home  CM will continue to follow

## 2022-06-16 NOTE — ASSESSMENT & PLAN NOTE
Suspect some element of CKD, no prior labs in our system  Able to find labs 4/2020 from [facility] where creatine was 1 1    · 1 4 on arrival, baseline?   · Monitor BMP  · Avoid hypotension  · Holding losartan

## 2022-06-16 NOTE — CONSULTS
PHYSICAL MEDICINE AND REHABILITATION CONSULT NOTE  James Correa 79 y o  male MRN: [id number]  Unit/Bed#: [facility] 288-67 Encounter: [id number]    Requested by (Physician/Service): Eri Alejandra MD  Reason for Consultation:  Assessment of rehabilitation needs    Assessment:  Rehabilitation Diagnosis:    TIA   Severe left ICA stenosis    Impaired mobility and self care    Recommendations:  Rehabilitation Plan:   Therapy evaluations are pending however the patient is currently without deficit on exam and will likely be able to d/c to home when cleared  He may require surgical intervention for stenosis   Covid-19 Testing: [facility] inpatient rehabilitation units require testing within 48 hours of all potential admissions at this time  *Re-testing is NOT required for patients recovering from COVID-19 infection if isolation has been discontinued per CDC criteria  Medical Co-morbidities Plan:  · CAD s/p drug eluting stent and CABG  · Paroxysmal atrial fibrillation   · CABG  · Hypertension  · HLD  · DVT ppx: Heparin and SCD    Thank you for this consultation  Do not hesitate to contact service with further questions  KISHOR Marcelino  PM&R    History of Present Illness:  James Correa is a 79 y o  male with a PMH of TIA, CAD s/p drug eluting stent x 2 in 2019, CABG in 2020, hypertension, hyperlipidemia who presented to the [address] on 6/15/22 with a week long history of weakness and numbness of his right ring finger  He did see his neurologist at [facility] on Monday and at that time he was hypertensive with SBP 190s  He reported that he was prescribed an anit-hypertensive at that time  He then developed right leg numbness and weakness followed by RUE weakness/numbness that resolved after a few minutes  He also had an episode of aphasia and dysarthria which also was transient   He presented initially to [address] [place] [address],[address] and by the time he presented his symptoms had resolved  CTA of the head and neck showed severe stenosis with essentially string sign of left proximal ICA  There was moderate stenosis of distal M2 and chronic infarcts of left caudate and left thalamus  He was transferred to [facility] for vascular evaluation  He was placed on ASA, Brillintal and Lipitor  Neurology and MRI are pending  PM&R are consulted for rehabilitation recommendations  The patient was seen in his room  He reports his symptoms have resolved  He denies any chest pain, SOB, dizziness, blurred vision, double vision, headache, weakness, numbness or tingling  Review of Systems: 10 point ROS negative except for what is noted in HPI    Function:  Prior level of function and living situation: The patient lives with his spouse in a two story home  He works in [place] and does stay there when working  He was independent  Current level of function:  Physical Therapy: Pending   Occupational Therapy: Pending   Speech Therapy: Pending     Physical Exam:  BP (!) 191/99   Pulse 64   SpO2 97%      No intake or output data in the 24 hours ending 06/16/22 0943    There is no height or weight on file to calculate BMI  Physical Exam  Constitutional:       Appearance: Normal appearance  HENT:      Head: Normocephalic and atraumatic  Right Ear: External ear normal       Left Ear: External ear normal       Nose: Nose normal    Eyes:      Extraocular Movements: Extraocular movements intact  Cardiovascular:      Pulses: Normal pulses  Pulmonary:      Effort: Pulmonary effort is normal    Abdominal:      General: There is no distension  Musculoskeletal:         General: Normal range of motion  Skin:     General: Skin is warm and dry  Neurological:      General: No focal deficit present  Mental Status: He is alert and oriented to person, place, and time  Mental status is at baseline     Psychiatric:         Mood and Affect: Mood normal         Social History: Social History     Socioeconomic History    Marital status: /Civil Union     Spouse name: Not on file    Number of children: Not on file    Years of education: Not on file    Highest education level: Not on file   Occupational History    Not on file   Tobacco Use    Smoking status: Never Smoker    Smokeless tobacco: Never Used   Vaping Use    Vaping Use: Never used   Substance and Sexual Activity    Alcohol use: Never    Drug use: Never    Sexual activity: Not Currently   Other Topics Concern    Not on file   Social History Narrative    Not on file     Social Determinants of Health     Financial Resource Strain: Not on file   Food Insecurity: Not on file   Transportation Needs: Not on file   Physical Activity: Not on file   Stress: Not on file   Social Connections: Not on file   Intimate Partner Violence: Not on file   Housing Stability: Not on file        Family History:    Family History   Problem Relation Age of Onset    Heart disease Mother     Kidney disease Mother     Heart disease Father          Medications:     Current Facility-Administered Medications:     aspirin (ECOTRIN LOW STRENGTH) EC tablet 81 mg, 81 mg, Oral, Daily, Elli Perez DO, 81 mg at 06/16/22 0840    atorvastatin (LIPITOR) tablet 40 mg, 40 mg, Oral, QPM, Elli Perez DO, 40 mg at 06/16/22 0840    heparin (porcine) subcutaneous injection 5,000 Units, 5,000 Units, Subcutaneous, Q8H [address] **AND** [COMPLETED] Platelet count, , , Once, Elli Perez DO    ticagrelor (BRILINTA) tablet 90 mg, 90 mg, Oral, Q12H [address], Elli Perez DO    Past Medical History:     Past Medical History:   Diagnosis Date    Asthma     Atrial fibrillation ([facility] )     Coronary artery disease     HTN (hypertension)     Hyperlipidemia     Myocardial infarction ([facility] ) 02/2019    Stroke ([place] [address] )     TIA (transient ischemic attack)         Past Surgical History:     Past Surgical History:   Procedure Laterality Date    APPENDECTOMY      CARDIAC CATHETERIZATION  04/17/2020    RCA: 80% distal lesion  LCX/OM2 stent placement  LAD with 70-80% lesion at D1 bifurcation   CARDIAC CATHETERIZATION  2019    OM2 and proximal LCX stenting   CATARACT EXTRACTION Right     CORONARY ARTERY BYPASS GRAFT      CORONARY STENT PLACEMENT  2019    OM2 and proximal LCX stenting         Allergies: Allergies   Allergen Reactions    Eggs Or Egg-Derived Products - Food Allergy Shortness Of Breath           LABORATORY RESULTS:      Lab Results   Component Value Date    HGB 14 8 06/15/2022    HCT 46 1 06/15/2022    WBC 7 03 06/15/2022     Lab Results   Component Value Date    BUN 21 06/15/2022    K 3 3 (L) 06/15/2022     06/15/2022    CREATININE 1 44 (H) 06/15/2022     Lab Results   Component Value Date    PROTIME 12 3 06/15/2022    INR 0 96 06/15/2022        DIAGNOSTIC STUDIES: Reviewed  No results found

## 2022-06-16 NOTE — EMTALA/ACUTE CARE TRANSFER
[facility] EMERGENCY DEPARTMENT  [address] [place] [id number]  Dept: [address] TRANSFER CONSENT    NAME Ward Hernandez                                         1955                              MRN [id number]    I have been informed of my rights regarding examination, treatment, and transfer   by Dr Erica Glasgow MD    Benefits: Specialized equipment and/or services available at the receiving facility (Include comment)__Neurology, Vascular Surgery___    Risks: Potential for delay in receiving treatment, Potential deterioration of medical condition, Loss of IV, Increased discomfort during transfer, Possible worsening of condition or death during transfer      Consent for Transfer:  I acknowledge that my medical condition has been evaluated and explained to me by the emergency department physician or other qualified medical person and/or my attending physician, who has recommended that I be transferred to the service of  Accepting Physician: Dr Perry Jorgensen at [facility] Name, [address] : [facility]  The above potential benefits of such transfer, the potential risks associated with such transfer, and the probable risks of not being transferred have been explained to me, and I fully understand them  The doctor has explained that, in my case, the benefits of transfer outweigh the risks  I agree to be transferred  I authorize the performance of emergency medical procedures and treatments upon me in both transit and upon arrival at the receiving facility  Additionally, I authorize the release of any and all medical records to the receiving facility and request they be transported with me, if possible  I understand that the safest mode of transportation during a medical emergency is an ambulance and that the Hospital advocates the use of this mode of transport   Risks of traveling to the receiving facility by car, including absence of medical control, life sustaining equipment, such as oxygen, and medical personnel has been explained to me and I fully understand them  (DELLA CORRECT BOX BELOW)  [  ]  I consent to the stated transfer and to be transported by ambulance/helicopter  [  ]  I consent to the stated transfer, but refuse transportation by ambulance and accept full responsibility for my transportation by car  I understand the risks of non-ambulance transfers and I exonerate the Hospital and its staff from any deterioration in my condition that results from this refusal     X___________________________________________    DATE  22  TIME________  Signature of patient or legally responsible individual signing on patient behalf           RELATIONSHIP TO PATIENT_________________________          Provider Certification    NAME Kerri Presumadelina                                         1955                              MRN [id number]    A medical screening exam was performed on the above named patient  Based on the examination:    Condition Necessitating Transfer The primary encounter diagnosis was Stenosis of left carotid artery  A diagnosis of TIA (transient ischemic attack) was also pertinent to this visit      Patient Condition: The patient has been stabilized such that within reasonable medical probability, no material deterioration of the patient condition or the condition of the unborn child(alexa) is likely to result from the transfer    Reason for Transfer: Level of Care needed not available at this facility    Transfer Requirements: [facility]   · Space available and qualified personnel available for treatment as acknowledged by    · Agreed to accept transfer and to provide appropriate medical treatment as acknowledged by       Dr Chacorta Mahmood  · Appropriate medical records of the examination and treatment of the patient are provided at the time of transfer   [address] _______  · Transfer will be performed by qualified personnel from    and appropriate transfer equipment as required, including the use of necessary and appropriate life support measures  Provider Certification: I have examined the patient and explained the following risks and benefits of being transferred/refusing transfer to the patient/family:         Based on these reasonable risks and benefits to the patient and/or the unborn child(alexa), and based upon the information available at the time of the patients examination, I certify that the medical benefits reasonably to be expected from the provision of appropriate medical treatments at another medical facility outweigh the increasing risks, if any, to the individuals medical condition, and in the case of labor to the unborn child, from effecting the transfer      X____________________________________________ DATE 06/16/22        TIME_______      ORIGINAL - SEND TO MEDICAL RECORDS   COPY - SEND WITH PATIENT DURING TRANSFER

## 2022-06-16 NOTE — ASSESSMENT & PLAN NOTE
With hypertensive urgency upon arrival  Upon my eval this AM, /105  · D/w neuro and cards, goal SBP around 160, with cautious lowering given carotid stenosis/TIA/Stroke  · Start Coreg 6 25 mg BID in place of home long acting Coreg  · Hold home losartan  · Consideration for additional of Norvasc later today if still above goal

## 2022-06-16 NOTE — ASSESSMENT & PLAN NOTE
CAD - s/p SHERIF lt circ 2019, SHERIF OM1 and distal RCA with robotic CABG- LIMA to LAD-diagonal 4/2020  · Cardiology consulted in setting of clearance for eventual carotid endarterectomy   · On DAPT, statin   Start Coreg at 6 25 mg BID per cards, typically on long acting Coreg at home

## 2022-06-16 NOTE — H&P
[address] [facility]  H&P- Jose Ramon [facility], [place] 1955, 79 y o  male MRN: [id number]  [facility] Encounter: [id number]  Primary Care Provider: No primary care provider on file  Date and time admitted to hospital: 6/16/2022  5:43 AM    * TIA (transient ischemic attack)  Assessment & Plan  -Tingling/loss of movement in R ring finger that improved 1 week ago   -Saw Neurologist at [facility] Monday and SBP noted to be in the 190's  -Acute onset R lower leg numbness/weakness about 30 hours ago which completely resolved after 1 hour  -At 1900 last night had entire R arm go numb and had weakness with resolution after 15 minutes  Then reported expressive aphasia which improved after 1 hour   -Denies any symptoms since   -Initially seen at [facility] ER and noted to have SBP of 200  -GCS 15 on arrival, no focal deficits on exam   -CTA head and Neck: Age indeterminate infarcts left caudate and left thalamus  Mild chronic microangiopathic changes  Suspect acute left maxillary sinusitis  Correlate clinically  No evidence of large vessel occlusion within the major vasculature of the [place]  Moderate stenosis within distal M2/perisylvian branch of the left MCA  Significant atherosclerotic disease left proximal cervical internal carotid artery resulting in severe stenosis/near total occlusion just distal to the ICA origin (greater than 90% by NASCET criteria)  Interventional/surgical consult advised  Incidental thyroid nodules for which nonemergent follow-up thyroid ultrasound is recommended  -ER staff at [facility] spoke with Neurology on call who recommended Aspirin, a single dose of Lovenox 1mg/kg and vascular surgery consult urgently;  Neurology recommending permissive HTN with SBP up to 220's   -ER staff spoke with Vascular on call who reported that patient could have consult performed on AM of 6/16 and they would not perform a CEA emergently overnight  -EKG: Normal sinus rhythm, ventricular rate 66, GA interval 154, , , right axis deviation, there are Q-waves in lead V2 suggestive of age-indeterminate septal infarct, there are T-wave abnormalities in lateral precordial as well as inferior leads, there is no STEMI  -Upon arrival at [facility], GCS 15 and no focal deficits on exam   Plan:  >Admit to medicine on telemetry CVA pathway  Aspirin and Atorvastatin ordered  Continue pta Brillinta  Order MRI brain and 2d echocardiogram  Will put on SC heparin 5000 units for now and await opinion of Vascular team on further anticoagulation  Keep npo except for medications  >Vascular and Neurology teams consulted  >Neuro checks per CVA protocol   >order lipid panel, a1c and tsh   >Daily labs         Thyroid nodule  Assessment & Plan  Noted on CTA H&N  Plan:  >Order tsh  >Will need non-emergent thyroid ultrasound     Carotid artery stenosis  Assessment & Plan  CTA notable for: No evidence of large vessel occlusion within the major vasculature of the [place]  Moderate stenosis within distal M2/perisylvian branch of the left MCA  Significant atherosclerotic disease left proximal cervical internal carotid artery resulting in severe stenosis/near total occlusion just distal to the ICA origin (greater than 90% by NASCET criteria)  Interventional/surgical consult advised  Plan:  >As above in TIA section     Mixed hyperlipidemia  Assessment & Plan  Hx HLD  Plan:  >Order lipid panel  Order Atorvastatin 40mg qhs       S/P CABG (coronary artery bypass graft)  Assessment & Plan  -Hx SHERIF x 2 in 2019   -Hx CABG in 2020   -Denies any chest pain   -troponin negative x 1, Denies any chest pain  -EKG: Normal sinus rhythm, ventricular rate 66, GA interval 154, , , right axis deviation, there are Q-waves in lead V2 suggestive of age-indeterminate septal infarct, there are T-wave abnormalities in lateral precordial as well as inferior leads, there is no stemi    -On pta Aspirin, Brillinta and metoprolol   Plan:  >Aspirin, Brillinta and Atorvastatin  Echo ordered as part of CVA pathway  Consider Cardiology consultation  >Hold pta metoprolol as allowing for permissive HTN            VTE Prophylaxis: Heparin  / sequential compression device   Code Status: Level 1 - Full Code     Anticipated Length of Stay:  Patient will be admitted on an Inpatient basis with an anticipated length of stay of  > 2 midnights  Justification for Hospital Stay: Please see detailed plans noted above  Chief Complaint:     TIA  History of Present Illness:  Ilan Gloria is a 79 y o  male who has past medical history significant for previous TIA, coronary disease status post drug-eluting stent x2 in 2019 and then CABG in 2020, hypertension, hyperlipidemia who presented to [facility] as transfer from [facility] ER for several TIAs over the past 30 hours  Patient reports that about a week ago the he had acute onset weakness and numbness of his right ring finger  He reports that this improved but this caused him to see a neurologist at [address] this past Monday where he was found to have a systolic blood pressure in the 190s  Patient reports that he was prescribed an antihypertensive medication but underwent no further imaging  Patient then reports that he had acute onset right lower leg numbness and weakness that improved after 1 hour on its own  Patient then reports that last night at around 1900 had acute onset right upper extremity weakness and numbness which resolved after a couple minutes  Patient then reports having episodes of slurred speech and what sounds like an expressive aphasia which resolved after about 30 minutes  Patient does report having had a TIA in 2019 when he 1st underwent stent placement for a myocardial infarction  Upon arrival at [facility] ER, patient reports that his neurologic deficits had all completely resolved    Upon arrival at [place], patient denies any symptoms of weakness or numbness  Patient denies any chest pain or shortness of breath  Review of Systems:    Constitutional:  Denies fever or chills   Eyes:  Denies change in visual acuity   HENT:  Denies nasal congestion or sore throat   Respiratory:  Denies cough or shortness of breath   Cardiovascular:  Denies chest pain or edema   GI:  Denies abdominal pain or bloody stools  :  Denies dysuria   Musculoskeletal:  Denies back pain or joint pain   Integument:  Denies rash   Neurologic:  Positive for weakness and sensory changes  Endocrine:  Denies polyuria or polydipsia   Lymphatic:  Denies swollen glands   Psychiatric:  Denies depression or anxiety     Past Medical and Surgical History:   Past Medical History:   Diagnosis Date    Asthma     Atrial fibrillation ([place] [address] )     Coronary artery disease     HTN (hypertension)     Hyperlipidemia     Myocardial infarction ([place] [address] ) 02/2019    Stroke ([place] [address] )     TIA (transient ischemic attack)      Past Surgical History:   Procedure Laterality Date    APPENDECTOMY      CARDIAC CATHETERIZATION  04/17/2020    RCA: 80% distal lesion  LCX/OM2 stent placement  LAD with 70-80% lesion at D1 bifurcation   CARDIAC CATHETERIZATION  2019    OM2 and proximal LCX stenting   CATARACT EXTRACTION Right     CORONARY ARTERY BYPASS GRAFT      CORONARY STENT PLACEMENT  2019    OM2 and proximal LCX stenting       Meds/Allergies:  Medications Prior to Admission   Medication    albuterol (PROVENTIL HFA,VENTOLIN HFA) 90 mcg/act inhaler    aspirin (ECOTRIN LOW STRENGTH) 81 mg EC tablet    Brilinta 90 MG    ezetimibe (ZETIA) 10 mg tablet    losartan (COZAAR) 50 mg tablet    metoprolol succinate (TOPROL-XL) 25 mg 24 hr tablet    metoprolol tartrate (LOPRESSOR) 25 mg tablet    nitroglycerin (NITROSTAT) 0 4 mg SL tablet    Symbicort 160-4 5 MCG/ACT inhaler       Allergies:    Allergies   Allergen Reactions    Eggs Or Egg-Derived Products - Food Allergy Shortness Of Breath History:    Substance Use History:   Social History     Substance and Sexual Activity   Alcohol Use Never     Social History     Tobacco Use   Smoking Status Never Smoker   Smokeless Tobacco Never Used     Social History     Substance and Sexual Activity   Drug Use Never       Family History:  Family History   Problem Relation Age of Onset    Heart disease Mother     Kidney disease Mother     Heart disease Father        Physical Exam:     Vitals:   Blood Pressure: (!) 205/99 (06/16/22 0556)    Constitutional:  Nontoxic appearing  Eyes:  PERRL, EOMI  HENT:  Atraumatic, oropharynx moist  Neck- non-tender   Respiratory:  No respiratory distress, no rales, no wheezing   Cardiovascular:  Normal rate,  no gallops, no rubs   GI:  Soft, nondistended, no guarding   :  No costovertebral angle tenderness   Musculoskeletal:  No edema, no tenderness, no deformities  Back- no tenderness  Integument:  Well hydrated, no rash   Lymphatic:  No lymphadenopathy noted   Neurologic:  Alert &awake, communicative, CN 2-12 normal,  no focal deficits noted on exam at [facility] on arrival  Psychiatric:  Speech and behavior appropriate       Lab Results: I have personally reviewed pertinent reports  Results from last 7 days   Lab Units 06/15/22  2202   WBC Thousand/uL 7 03   HEMOGLOBIN g/dL 14 8   HEMATOCRIT % 46 1   PLATELETS Thousands/uL 208   NEUTROS PCT % 66   LYMPHS PCT % 21   MONOS PCT % 9   EOS PCT % 3     Results from last 7 days   Lab Units 06/15/22  2202   POTASSIUM mmol/L 3 3*   CHLORIDE mmol/L 103   CO2 mmol/L 30   BUN mg/dL 21   CREATININE mg/dL 1 44*   CALCIUM mg/dL 8 8   ALK PHOS U/L 81   ALT U/L 27   AST U/L 18     Results from last 7 days   Lab Units 06/15/22  2202   INR  0 96         Imaging: I have personally reviewed pertinent reports        CTA head and neck with and without contrast    Result Date: 6/15/2022  Narrative: CTA NECK AND BRAIN WITH AND WITHOUT CONTRAST INDICATION: difficulty with speech and right arm and leg numbness one hour ago, symptoms now resolved COMPARISON:   None  TECHNIQUE:  Routine CT imaging of the Brain without contrast   Post contrast imaging was performed after administration of iodinated contrast through the neck and brain  Post contrast axial 0 625 mm images timed to opacify the arterial system  3D rendering was performed on an independent workstation  MIP reconstructions performed  Coronal reconstructions were performed of the noncontrast portion of the brain  Radiation dose length product (DLP) for this visit:  1414 13 mGy-cm   This examination, like all CT scans performed in the [facility], was performed utilizing techniques to minimize radiation dose exposure, including the use of iterative reconstruction and automated exposure control  IV Contrast:  65 mL of iohexol (OMNIPAQUE)  IMAGE QUALITY:   Diagnostic FINDINGS: NONCONTRAST BRAIN PARENCHYMA: Decreased attenuation is noted in periventricular and subcortical white matter demonstrating an appearance that is statistically most likely to represent mild microangiopathic change  No CT signs of acute territorial infarction  Age indeterminate infarcts are noted in the left caudate and left thalamus  No intracranial mass, mass effect or midline shift  No acute parenchymal hemorrhage  VENTRICLES AND EXTRA-AXIAL SPACES:  Normal for the patient's age  VISUALIZED ORBITS AND PARANASAL SINUSES:  Post bilateral ocular lens replacement  Mucosal thickening with frothy secretion in the left maxillary sinus  CERVICAL VASCULATURE AORTIC ARCH AND GREAT VESSELS:  Normal aortic arch and great vessel origins  Normal visualized subclavian vessels  RIGHT VERTEBRAL ARTERY CERVICAL SEGMENT:  Normal origin  The vessel is normal in caliber throughout the neck  LEFT VERTEBRAL ARTERY CERVICAL SEGMENT:  Normal origin  The vessel is normal in caliber throughout the neck   RIGHT EXTRACRANIAL CAROTID SEGMENT:  Trace atherosclerotic disease of the distal common carotid artery and proximal cervical internal carotid artery without significant stenosis compared to the more distal ICA  LEFT EXTRACRANIAL CAROTID SEGMENT:  Severe atherosclerotic changes are noted at the proximal cervical internal carotid artery with severe stenosis/near total occlusion (greater than 90%) just distal to the ICA origin with only a faint sliver of contrast seen at this level (5:340-350)  NASCET criteria was used to determine the degree of internal carotid artery diameter stenosis  INTRACRANIAL VASCULATURE INTERNAL CAROTID ARTERIES:  Normal enhancement of the intracranial portions of the internal carotid arteries  Normal ophthalmic artery origins  Normal ICA terminus  ANTERIOR CIRCULATION:  Symmetric A1 segments and anterior cerebral arteries with normal enhancement  Normal anterior communicating artery  MIDDLE CEREBRAL ARTERY CIRCULATION:  Patent MCA's proximally  There is moderate stenosis within distal M2/perisylvian branch of the left MCA noted (5:151 -161)  DISTAL VERTEBRAL ARTERIES:  Normal distal vertebral arteries, dominant on the left  Posterior inferior cerebellar artery origins are normal  Normal vertebral basilar junction  BASILAR ARTERY:  Basilar artery is normal in caliber  Normal superior cerebellar arteries  POSTERIOR CEREBRAL ARTERIES: There is fetal origin of the right posterior cerebral artery  The left posterior cerebral artery arises from the basilar tip  Both demonstrate no focal stenosis  Normal posterior communicating arteries  VENOUS STRUCTURES:  Patent  NON VASCULAR ANATOMY BONY STRUCTURES:  No acute osseous abnormality   SOFT TISSUES OF THE NECK: Incidental discovery of one or more thyroid nodule(s) measuring more than 1 5 cm and without suspicious features is noted in this patient who is above 28years old; according to guidelines published in the February 2015 white paper on incidental thyroid nodules in the Journal of the [employer] of Radiology [place]), further characterization with thyroid ultrasound is recommended  THORACIC INLET:  Normal      Impression: Age indeterminate infarcts left caudate and left thalamus  Mild chronic microangiopathic changes  Suspect acute left maxillary sinusitis  Correlate clinically  No evidence of large vessel occlusion within the major vasculature of the [place]  Moderate stenosis within distal M2/perisylvian branch of the left MCA  Significant atherosclerotic disease left proximal cervical internal carotid artery resulting in severe stenosis/near total occlusion just distal to the ICA origin (greater than 90% by NASCET criteria)  Interventional/surgical consult advised  Incidental thyroid nodules for which nonemergent follow-up thyroid ultrasound is recommended  Above findings discussed with Dr Jolly Alejandro at 11:15 PM on 6/15/2022  Workstation performed: [id number]         ** Please Note: Dragon 360 Dictation voice to text software was used in the creation of this document   **

## 2022-06-16 NOTE — CONSULTS
Consultation - Neurology   Nina Wagner 79 y o  male MRN: [id number]  Unit/Bed#: [facility] 717-01 Encounter: [id number]    Assessment/Plan   * TIA (transient ischemic attack)  Assessment & Plan  79year old male with hx of afib (not on anticoagulation), CAD/MI/CABG/SHERIF, htn, hyperlipidemia, and TIA/Stroke  The patient presents with two focal neurological events over the last few days on the right side, concerning for TIA vs small stroke  Most likely etiology is from a symptomatic left carotid (ascvd left proximal cervical internal carotid artery resulting in severe/near total occlusion just distal to the ICA origin,as well as moderate stenosis of distal M2 of left MCA), MRI of brain to clarify  The patient does have a hx of afib and is not on anticoagulation      - Stroke/TIA pathway   MRI brain with out contrast   Echo   Carotid doppler   Lipid Panel - Trig 142, Chol 211, LDL 136H   Hemoglobin A1c - 6 0   On DAPT, Aspirin 81 mg and Brilinta, the patient reports he was not taking this at home   Atorvastatin 40 mg   Normotensive blood pressure goals with cautious lowering to goal, avoid hypotension for cerebral perfusion   Continue telemetry   PT/OT/ST   PMR   Frequent neuro checks  Continue to monitor and notify neurology with any changes  - Medical management and supportive care per primary team  Correction of any metabolic or infectious disturbances  - Vascular and cardiology consulted  Carotid artery stenosis  Assessment & Plan  As above    Nina Wagner will need follow up in in 6 weeks with neurovascular attending or advance practitioner  History of Present Illness     Reason for Consult / Principal Problem: TIA    HPI: Nina Wagner is a 79 y o   male with hx of afib (not on anticoagulation), CAD/MI,CABG/SHERIF, htn, hyperlipidemia, TIA/Stroke  Pre record review, the patient presented with right arm paraesthesia and slurred speech  It is reported his symptoms resolved    CTA was completed - with age indeterminate infarcts int he left caudate and left thalamus, mild chronic microangiopathic changes, moderate stenosis within the left M2 and perisylvian branch of the left MCA  There was significant atherosclerotic disease of left proximal cervical internal carotid artery resulting in severe stenosis/near total occlusion just distal to the ICA origin (90%)  The patient presented to the ED at [facility] and was transferred to [facility] for further evaluation and management  Cardiology, vascular and neurology were consulted  It is reported in the initial HPI the patient has been having TIAs over the last few days  The patient reported that about a week ago and had acute onset of weakness and numbness of his right ring finger  It was reported to have improved and saw his neurologist at [facility], and found to have an elevated blood pressure 302 systolic, the patient was prescribed anti hypertensive medications  The patient also had right lower leg numbness and weakness that improved after 1 hour, he had the night prior to arrival acute onset of slurred speech and expressive aphasia which lasted 30 minutes  Secondary to these events he came to the ED for evaluation  There is a note in care everywhere by neurology which I was able to review, parts of this encounter  It was reported for his right sided symptoms they would like to get an MRI of the brain, CPAP for sleep apnea, as well as be on Asa 81 mg daily  The patient reports he has an extensive cardiac hx, he has a hx of CABG and SHERIF, he reports he was on [place], the Brilinta was discontinued  He has not been compliant with his ASA  He reports he has a hx of TIA in the past, transient right upper extremity numbness/weakness  He reports that he has chronic numbness in his right toe  He reports that he a several transient neurological issues over the last few days, which started on Tuesday evening     On Tuesday he had transient right lower extremity weakness and numbness, from the knee down the left, no pain or radicular features  He reports this was dead weight  This lasted 90 minutes and resolved  He reports on Wednesday, he had a transient episode of the left upper extremity numbness and weakness, with inability to get his words out, this lasted a few minutes and resolved  After the last episode is when he came to the hospital for evaluation  There was no other associated symptoms with the above  Currently he is back to baseline, he may still have some dexterity difficulties on his right upper, 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   However, subjective, not objective  Inpatient consult to Neurology  Consult performed by: Diya Casas PA-C  Consult ordered by: Matthew Swain DO        Review of Systems   Constitutional: Negative  HENT: Negative  Eyes: Negative  Respiratory: Negative  Cardiovascular: Negative  Gastrointestinal: Negative  Endocrine: Negative  Genitourinary: Negative  Musculoskeletal: Negative  Allergic/Immunologic: Negative  Neurological:        Transient focal neurological weakness and numbness, as well as aphasia  Possible slight dexterity problems on his right upper compared to left, subjective   Hematological: Negative  Psychiatric/Behavioral: Negative  Historical Information   Past Medical History:   Diagnosis Date    Asthma     [facility]     Coronary artery disease     HTN (hypertension)     Hyperlipidemia     Myocardial infarction ([facility] ) 02/2019    [facility]     TIA (transient ischemic attack)      Past Surgical History:   Procedure Laterality Date    APPENDECTOMY      CARDIAC CATHETERIZATION  04/17/2020    RCA: 80% distal lesion  LCX/OM2 stent placement  LAD with 70-80% lesion at D1 bifurcation   CARDIAC CATHETERIZATION  2019    OM2 and proximal LCX stenting      CATARACT EXTRACTION Right     CORONARY ARTERY BYPASS GRAFT      CORONARY STENT PLACEMENT  2019    OM2 and proximal LCX stenting     Social History   Social History     Substance and Sexual Activity   Alcohol Use Never     Social History     Substance and Sexual Activity   Drug Use Never     E-Cigarette/Vaping    E-Cigarette Use Never User      E-Cigarette/Vaping Substances    Nicotine No     THC No     CBD No     Flavoring No     Other No     Unknown No      Social History     Tobacco Use   Smoking Status Never Smoker   Smokeless Tobacco Never Used     Family History:   Family History   Problem Relation Age of Onset    Heart disease Mother     Kidney disease Mother     Heart disease Father      Review of previous medical records was completed  Reviewed cardiology and neurology notes  Meds/Allergies   all current active meds have been reviewed, current meds:   Current Facility-Administered Medications   Medication Dose Route Frequency    aspirin (ECOTRIN LOW STRENGTH) EC tablet 81 mg  81 mg Oral Daily    atorvastatin (LIPITOR) tablet 40 mg  40 mg Oral QPM    heparin (porcine) subcutaneous injection 5,000 Units  5,000 Units Subcutaneous Q8H [facility]    ticagrelor (BRILINTA) tablet 90 mg  90 mg Oral Q12H [facility]    and PTA meds:   Prior to Admission Medications   Prescriptions Last Dose Informant Patient Reported? Taking? Brilinta 90 MG   No No   Sig: Take 1 tablet (60 mg total) by mouth every 12 (twelve) hours   Symbicort 160-4 5 MCG/ACT inhaler   No No   Sig: Inhale 2 puffs 2 (two) times a day Rinse mouth after use     albuterol (PROVENTIL HFA,VENTOLIN HFA) 90 mcg/act inhaler   No No   Sig: Inhale 2 puffs every 6 (six) hours as needed for wheezing   aspirin (ECOTRIN LOW STRENGTH) 81 mg EC tablet   Yes No   Sig: Take 81 mg by mouth daily   ezetimibe (ZETIA) 10 mg tablet  Self Yes No   Sig: Take 10 mg by mouth daily Take one tablet by mouth once daily at night    losartan (COZAAR) 50 mg tablet   No No   Sig: Take 1 tablet (50 mg total) by mouth daily   metoprolol succinate (TOPROL-XL) 25 mg 24 hr tablet   No No   Sig: Take 1/2 tablets (12 5 mg total) by mouth daily   metoprolol tartrate (LOPRESSOR) 25 mg tablet   No No   Sig: Take 0 5 tablets (12 5 mg total) by mouth every 12 (twelve) hours   nitroglycerin (NITROSTAT) 0 4 mg SL tablet   No No   Sig: Place 1 tablet (0 4 mg total) under the tongue every 5 (five) minutes as needed for chest pain      Facility-Administered Medications: None     Allergies   Allergen Reactions    Eggs Or Egg-Derived Products - Food Allergy Shortness Of Breath     Objective   Vitals:Blood pressure (!) 209/105, pulse 64, SpO2 95 %  ,There is no height or weight on file to calculate BMI  No intake or output data in the 24 hours ending 06/16/22 1011    Invasive Devices: Invasive Devices  Report    Peripheral Intravenous Line  Duration           Peripheral IV 06/15/22 Right Antecubital <1 day              VS reviewed - 191/99 mmhg  Constitutional - in NAD, lying in bed in nad, was on the phone prior to arrival    HEENT - NC/AT, no icterus noted, conjuctiva clear, oral mucosa free of exudate  Cardiac -rate regular  Lungs - no respiratory distress noted, no dyspnea to conversation  Abdomen - non distended  Extremities - No edema noted  Skin - no rashes noted    Neurological    Mental status - the patient is awake alert oriented x 3, with no evidence of aphasia or dysarthria, patient is able to follow simple commands, is able to follow complex commands  No paraphasic errors noted  The patient is able to do calculations, read and repeat  He has good insight and good historian  Cranial nerves 2 through 12 are intact    Motor - 5/5 upper extremities and lower extremities, without drift, normal tone and bulk    No tremor or fixed deficit noted    Rapid movements are equal bilaterally    Sensation - nonfocal to touch, no neglect noted       Coordination -  no ataxia noted on finger-to-nose or heel-to-shin    [facility] intact bilateral uppers, finger to finger testing normal bilaterally  Reflexes are equal - 1 in the uppers and lowers  Toes are downgoing bilaterally    No evidence of clonus or myoclonus or tremor  No evidence of clinical seizure activity, observed  NIHSS:    1a Level of Consciousness: 0 = Alert   1b  LOC Questions: 0 = Answers both correctly   1c  LOC Commands: 0 = Obeys both correctly   2  Best Gaze: 0 = Normal   3  Visual: 0 = No visual field loss   4  Facial Palsy: 0=Normal symmetric movement   5a  Motor Right Arm: 0=No drift, limb holds 90 (or 45) degrees for full 10 seconds   5b  Motor Left Arm: 0=No drift, limb holds 90 (or 45) degrees for full 10 seconds   6a  Motor Right Le=No drift, limb holds 90 (or 45) degrees for full 10 seconds   6b  Motor Left Le=No drift, limb holds 90 (or 45) degrees for full 10 seconds   7  Limb Ataxia:  0=Absent   8  Sensory: 0=Normal; no sensory loss   9  Best Language:  0=No aphasia, normal   10  Dysarthria: 0=Normal articulation   11  Extinction and Inattention (formerly Neglect): 0=No abnormality   Total Score: 0     Lab Results:   I have personally reviewed pertinent reports    , CBC:   Results from last 7 days   Lab Units 22  0638 06/15/22  2202   WBC Thousand/uL  --  7 03   RBC Million/uL  --  5 08   HEMOGLOBIN g/dL  --  14 8   HEMATOCRIT %  --  46 1   MCV fL  --  91   PLATELETS Thousands/uL 202 208   , BMP/CMP:   Results from last 7 days   Lab Units 06/15/22  2202   SODIUM mmol/L 140   POTASSIUM mmol/L 3 3*   CHLORIDE mmol/L 103   CO2 mmol/L 30   BUN mg/dL 21   CREATININE mg/dL 1 44*   CALCIUM mg/dL 8 8   AST U/L 18   ALT U/L 27   ALK PHOS U/L 81   EGFR ml/min/1 73sq m 49   , Vitamin B12:   , HgBA1C:   Results from last 7 days   Lab Units 22  0638   HEMOGLOBIN A1C % 6 0*   , TSH:   Results from last 7 days   Lab Units 22  0853   TSH 3RD GENERATON uIU/mL 2 930   , Coagulation:   Results from last 7 days   Lab Units 06/15/22  2202   INR 0 96   , Lipid Profile:   Results from last 7 days   Lab Units 06/16/22  0853   HDL mg/dL 47   LDL CALC mg/dL 130*   TRIGLYCERIDES mg/dL 152*   , Ammonia:   , Urinalysis:       Invalid input(s): URIBILINOGEN, Drug Screen:   , Medication Drug Levels:       Invalid input(s): CARBAMAZEPINE,  PHENOBARB, LACOSAMIDE, OXCARBAZEPINE     Procedure: CTA head and neck with and without contrast    Result Date: 6/15/2022  Narrative: CTA NECK AND BRAIN WITH AND WITHOUT CONTRAST INDICATION: difficulty with speech and right arm and leg numbness one hour ago, symptoms now resolved COMPARISON:   None  TECHNIQUE:  Routine CT imaging of the Brain without contrast   Post contrast imaging was performed after administration of iodinated contrast through the neck and brain  Post contrast axial 0 625 mm images timed to opacify the arterial system  3D rendering was performed on an independent workstation  MIP reconstructions performed  Coronal reconstructions were performed of the noncontrast portion of the brain  Radiation dose length product (DLP) for this visit:  1414 13 mGy-cm   This examination, like all CT scans performed in the [facility], was performed utilizing techniques to minimize radiation dose exposure, including the use of iterative reconstruction and automated exposure control  IV Contrast:  65 mL of iohexol (OMNIPAQUE)  IMAGE QUALITY:   Diagnostic FINDINGS: NONCONTRAST BRAIN PARENCHYMA: Decreased attenuation is noted in periventricular and subcortical white matter demonstrating an appearance that is statistically most likely to represent mild microangiopathic change  No CT signs of acute territorial infarction  Age indeterminate infarcts are noted in the left caudate and left thalamus  No intracranial mass, mass effect or midline shift  No acute parenchymal hemorrhage  VENTRICLES AND EXTRA-AXIAL SPACES:  Normal for the patient's age   VISUALIZED ORBITS AND PARANASAL SINUSES:  Post bilateral ocular lens replacement  Mucosal thickening with frothy secretion in the left maxillary sinus  CERVICAL VASCULATURE AORTIC ARCH AND GREAT VESSELS:  Normal aortic arch and great vessel origins  Normal visualized subclavian vessels  RIGHT VERTEBRAL ARTERY CERVICAL SEGMENT:  Normal origin  The vessel is normal in caliber throughout the neck  LEFT VERTEBRAL ARTERY CERVICAL SEGMENT:  Normal origin  The vessel is normal in caliber throughout the neck  RIGHT EXTRACRANIAL CAROTID SEGMENT:  Trace atherosclerotic disease of the distal common carotid artery and proximal cervical internal carotid artery without significant stenosis compared to the more distal ICA  LEFT EXTRACRANIAL CAROTID SEGMENT:  Severe atherosclerotic changes are noted at the proximal cervical internal carotid artery with severe stenosis/near total occlusion (greater than 90%) just distal to the ICA origin with only a faint sliver of contrast seen at this level (5:340-350)  NASCET criteria was used to determine the degree of internal carotid artery diameter stenosis  INTRACRANIAL VASCULATURE INTERNAL CAROTID ARTERIES:  Normal enhancement of the intracranial portions of the internal carotid arteries  Normal ophthalmic artery origins  Normal ICA terminus  ANTERIOR CIRCULATION:  Symmetric A1 segments and anterior cerebral arteries with normal enhancement  Normal anterior communicating artery  MIDDLE CEREBRAL ARTERY CIRCULATION:  Patent MCA's proximally  There is moderate stenosis within distal M2/perisylvian branch of the left MCA noted (5:151 -161)  DISTAL VERTEBRAL ARTERIES:  Normal distal vertebral arteries, dominant on the left  Posterior inferior cerebellar artery origins are normal  Normal vertebral basilar junction  BASILAR ARTERY:  Basilar artery is normal in caliber  Normal superior cerebellar arteries  POSTERIOR CEREBRAL ARTERIES: There is fetal origin of the right posterior cerebral artery    The left posterior cerebral artery arises from the basilar tip   Both demonstrate no focal stenosis  Normal posterior communicating arteries  VENOUS STRUCTURES:  Patent  NON VASCULAR ANATOMY BONY STRUCTURES:  No acute osseous abnormality  SOFT TISSUES OF THE NECK: Incidental discovery of one or more thyroid nodule(s) measuring more than 1 5 cm and without suspicious features is noted in this patient who is above 28years old; according to guidelines published in the February 2015 white paper on incidental thyroid nodules in the Journal of the [employer] of Radiology Shiva Taylor), further characterization with thyroid ultrasound is recommended  THORACIC INLET:  Normal      Impression: Age indeterminate infarcts left caudate and left thalamus  Mild chronic microangiopathic changes  Suspect acute left maxillary sinusitis  Correlate clinically  No evidence of large vessel occlusion within the major vasculature of the [place]  Moderate stenosis within distal M2/perisylvian branch of the left MCA  Significant atherosclerotic disease left proximal cervical internal carotid artery resulting in severe stenosis/near total occlusion just distal to the ICA origin (greater than 90% by NASCET criteria)  Interventional/surgical consult advised  Incidental thyroid nodules for which nonemergent follow-up thyroid ultrasound is recommended  Above findings discussed with Dr Jan Ferreira at 11:15 PM on 6/15/2022  Workstation performed: [id number]     Imaging Studies: I have personally reviewed pertinent reports  EKG, Pathology, and Other Studies: I have personally reviewed pertinent reports  Code Status: Level 1 - Full Code    Counseling / Coordination of Care  Reviewed case with neurology attending, history and physical examination, labs and imaging completed, plan of care as per attending physician  Please see attestation for further details  Examined alongside attending physician

## 2022-06-16 NOTE — ASSESSMENT & PLAN NOTE
CTA notable for: No evidence of large vessel occlusion within the major vasculature of the [place]  Moderate stenosis within distal M2/perisylvian branch of the left MCA  Significant atherosclerotic disease left proximal cervical internal carotid artery resulting in severe stenosis/near total occlusion just distal to the ICA origin (greater than 90% by NASCET criteria)  Interventional/surgical consult advised    Plan:  >As above in TIA section

## 2022-06-16 NOTE — CONSULTS
Consultation - Cardiology Team 1  Lawrence Senior 79 y o  male MRN: [id number]  Unit/Bed#: [facility] 024-69 Encounter: [id number]    Assessment/Plan     Principal Problem:    TIA (transient ischemic attack)  Active Problems:    S/P CABG (coronary artery bypass graft)    Mixed hyperlipidemia    Carotid artery stenosis    Thyroid nodule      Assessment/Plan    1  TIA- patient has had 2 focal neurological events over last few days on the right side concerning for TIA versus small stroke  Per neurology- most likely etiology for symptomatic left carotid stenosis  Neurology evaluated  For brain MRI  Follow-up with TTE  Carotid Doppler  Currently on DA PT-aspirin and Brilinta  Will monitor telemetry  Presenting EKG- NSR    2  Carotid artery stenosis  Left proximal cervical internal carotid artery with significant atherosclerotic disease resulting in severe stenosis/near occlusion just distal to ICA origin  Vascular surgery requesting cardiology consult for risk stratification for carotid endarterectomy  Feels lesion is non amenable to TCAR  Patient has had coronary revascularization 04/2020 with normal stress echocardiogram last month no anginal symptoms  I do not anticipate any further cardiac workup prior to the planned procedure but will review with attending  Patient with normal LV function and no history of congestive heart failure  Hs Troponin 6  ProBNP 166     3  CAD - s/p SHERIF lt circ 2019, SHERIF OM1 and distal RCA with robotic CABG- LIMA to LAD-diagonal 4/2020  Recent stress echo - no evidence of ischemia  Patient without complaints of anginal symptoms  His Brilinta has been discontinued and I confirmed he is not taking, he takes aspirin  Not currently on statin due to myalgia  He has a agreeable to retry and his already been started on atorvastatin 40 mg daily  His beta-blocker has been resumed-with carvedilol 6 25 mg b i d  (he believes he was recently changed to carvedilol CR 40 mg daily)    4   HTN- patient reports his blood pressure more so recently has been elevated  Permissive HTN of SBP to 220's initially  Neurology now recommends more normotensive blood pressure but no significant drop  Neurology spoke with slim to is recommending SBP closer to 160s today  Starting carvedilol 6 25 mg b i d  And may need amlodipine added this afternoon  Patient previously was on Toprol believes he was recently changed to carvedilol CR 40 mg daily just this past Monday  Also on losartan 50 mg which is currently on hold  5  HLD-cholesterol 211/triglyceride 142/  Started on atorvastatin 40 mg  Myalgias with prior statin but patient does not recall details    6  PAF-per reports PAF at the time of his MI 2019 for which he spontaneously converted  No anticoagulation has been recommended  Will continue to monitor telemetry  Consider outpatient loop recorder for atrial fibrillation surveillance  Presenting EKG sinus rhythm    History of Present Illness   Physician Requesting Consult: Josie Guerrero MD  Reason for Consult / Principal Problem:  Preoperative risk stratification    HPI: Christos Chambers is a 79y o  year old male with CAD/ acute MI Feb 2019 associated with paroxysmal atrial fibrillation, S/P left circ stent followed by OM1 SHERIF , distal RCA SHERIF and robotic CABG with LIMA to LAD-diagonal at [facility]  He is followed locally in our [place] office  Additionally with history of asthma, hypertension, hyperlipidemia, TIA at time of MI 2019 per his report  On arrival to [place] blood pressure 205/99  Neurology suggested- aspirin with a single dose of Lovenox 1 milligram/kilogram followed by vascular surgery consult  Neurology recommended permissive hypertension with SBP up to the 220s  Today neurology recommends more normotensive  blood pressure goals with cautious lowering  Avoiding hypotension  Suggestion of SBP less than 160   Patient feels well without any complaints of neurological symptoms  No chest pain or shortness of breath  He presented to [facility] ER yesterday with right leg numbness and elevated blood pressure  CTA head and neck demonstrates severe stenosis/near total occlusion just distal to the ICA origin  Moderate stenosis of distal M2 and chronic infarcts of left caudate and left thalamus  Stress echocardiogram 04/2022  BP with peak exercise 220/80  EKG nondiagnostic due to failure chief target heart rate  Post stress echocardiogram did not meet criteria for ischemia  ST depression noted at peak exercise but remained nondiagnostic due to baseline ST segment abnormalities    Stress echocardiogram 03/2021-no regional motion abnormalities with peak stress  Reviewed with SLIM    Inpatient consult to Cardiology  Consult performed by: KISHOR Abdullahi  Consult ordered by: Kole Santoyo MD          Review of Systems   Constitutional: Negative  HENT: Negative  Eyes: Positive for visual disturbance  Respiratory: Negative for shortness of breath  Cardiovascular: Negative for chest pain  Gastrointestinal: Negative  Endocrine: Negative  Genitourinary: Negative  Musculoskeletal: Negative  Skin: Negative  Allergic/Immunologic: Negative  Neurological: Positive for weakness  Negative for light-headedness  Hematological: Negative  Psychiatric/Behavioral: Negative  All other systems reviewed and are negative  Historical Information   Past Medical History:   Diagnosis Date    Asthma     [facility]     Coronary artery disease     HTN (hypertension)     Hyperlipidemia     Myocardial infarction ([facility] ) 02/2019    Stroke ([facility]     TIA (transient ischemic attack)      Past Surgical History:   Procedure Laterality Date    APPENDECTOMY      CARDIAC CATHETERIZATION  04/17/2020    RCA: 80% distal lesion  LCX/OM2 stent placement  LAD with 70-80% lesion at D1 bifurcation      CARDIAC CATHETERIZATION  2019    OM2 and proximal LCX stenting   CATARACT EXTRACTION Right     CORONARY ARTERY BYPASS GRAFT      CORONARY STENT PLACEMENT  2019    OM2 and proximal LCX stenting     Social History     Substance and Sexual Activity   Alcohol Use Never     Social History     Substance and Sexual Activity   Drug Use Never     Social History     Tobacco Use   Smoking Status Never Smoker   Smokeless Tobacco Never Used     Family History:   Family History   Problem Relation Age of Onset    Heart disease Mother     Kidney disease Mother     Heart disease Father        Meds/Allergies   current meds:   Current Facility-Administered Medications   Medication Dose Route Frequency    aspirin (ECOTRIN LOW STRENGTH) EC tablet 81 mg  81 mg Oral Daily    atorvastatin (LIPITOR) tablet 40 mg  40 mg Oral QPM    heparin (porcine) subcutaneous injection 5,000 Units  5,000 Units Subcutaneous Q8H [facility]    ticagrelor (BRILINTA) tablet 90 mg  90 mg Oral Q12H [facility]    and PTA meds:    Medications Prior to Admission   Medication    albuterol (PROVENTIL HFA,VENTOLIN HFA) 90 mcg/act inhaler    aspirin (ECOTRIN LOW STRENGTH) 81 mg EC tablet    Brilinta 90 MG    ezetimibe (ZETIA) 10 mg tablet    losartan (COZAAR) 50 mg tablet    metoprolol succinate (TOPROL-XL) 25 mg 24 hr tablet    metoprolol tartrate (LOPRESSOR) 25 mg tablet    nitroglycerin (NITROSTAT) 0 4 mg SL tablet    Symbicort 160-4 5 MCG/ACT inhaler     Allergies   Allergen Reactions    Eggs Or Egg-Derived Products - Food Allergy Shortness Of Breath       Objective   Vitals: Blood pressure (!) 191/99, pulse 64, SpO2 97 %    Orthostatic Blood Pressures    Flowsheet Row Most Recent Value   Blood Pressure 191/99 filed at 06/16/2022 0834          No intake or output data in the 24 hours ending 06/16/22 0951    Invasive Devices  Report    Peripheral Intravenous Line  Duration           Peripheral IV 06/15/22 Right Antecubital <1 day                Physical Exam: BP (!) 191/99   Pulse 64   SpO2 97% General Appearance:    Alert, cooperative, no distress, appears stated age   Head:    Normocephalic, no scleral icterus   Eyes:    PERRL   Nose:   Nares normal, septum midline, mucosa normal, no drainage    Throat:   Lips, mucosa, and tongue normal   Neck:   Supple, symmetrical, trachea midline            Lungs:     Clear to auscultation bilaterally, respirations unlabored   Chest Wall:    No tenderness or deformity    Heart:    Regular rate and rhythm, S1 and S2 normal, no murmur, rub   or gallop   Abdomen:     Soft, non-tender, bowel sounds active all four quadrants,     no masses, no organomegaly   Extremities:   Extremities normal, atraumatic, no cyanosis or edema   Pulses:   2+ and symmetric all extremities   Skin:   Skin color, texture, turgor normal, no rashes or lesions   Neurologic:   Alert and oriented to person place and time          Lab Results:   Recent Results (from the past 72 hour(s))   Fingerstick Glucose (POCT)    Collection Time: 06/15/22  9:32 PM   Result Value Ref Range    POC Glucose 159 (H) 65 - 140 mg/dl   Comprehensive metabolic panel    Collection Time: 06/15/22 10:02 PM   Result Value Ref Range    Sodium 140 136 - 145 mmol/L    Potassium 3 3 (L) 3 5 - 5 3 mmol/L    Chloride 103 100 - 108 mmol/L    CO2 30 21 - 32 mmol/L    ANION GAP 7 4 - 13 mmol/L    BUN 21 5 - 25 mg/dL    Creatinine 1 44 (H) 0 60 - 1 30 mg/dL    Glucose 162 (H) 65 - 140 mg/dL    Calcium 8 8 8 3 - 10 1 mg/dL    AST 18 5 - 45 U/L    ALT 27 12 - 78 U/L    Alkaline Phosphatase 81 46 - 116 U/L    Total Protein 6 9 6 4 - 8 2 g/dL    Albumin 3 8 3 5 - 5 0 g/dL    Total Bilirubin 1 53 (H) 0 20 - 1 00 mg/dL    eGFR 49 ml/min/1 73sq m   CBC and differential    Collection Time: 06/15/22 10:02 PM   Result Value Ref Range    WBC 7 03 4 31 - 10 16 Thousand/uL    RBC 5 08 3 88 - 5 62 Million/uL    Hemoglobin 14 8 12 0 - 17 0 g/dL    Hematocrit 46 1 36 5 - 49 3 %    MCV 91 82 - 98 fL    MCH 29 1 26 8 - 34 3 pg    MCHC 32 1 31 4 - 37 4 g/dL    RDW 13 0 11 6 - 15 1 %    MPV 9 6 8 9 - 12 7 fL    Platelets 373 765 - 553 Thousands/uL    nRBC 0 /100 WBCs    Neutrophils Relative 66 43 - 75 %    Immat GRANS % 0 0 - 2 %    Lymphocytes Relative 21 14 - 44 %    Monocytes Relative 9 4 - 12 %    Eosinophils Relative 3 0 - 6 %    Basophils Relative 1 0 - 1 %    Neutrophils Absolute 4 72 1 85 - 7 62 Thousands/µL    Immature Grans Absolute 0 01 0 00 - 0 20 Thousand/uL    Lymphocytes Absolute 1 46 0 60 - 4 47 Thousands/µL    Monocytes Absolute 0 62 0 17 - 1 22 Thousand/µL    Eosinophils Absolute 0 18 0 00 - 0 61 Thousand/µL    Basophils Absolute 0 04 0 00 - 0 10 Thousands/µL   Protime-INR    Collection Time: 06/15/22 10:02 PM   Result Value Ref Range    Protime 12 3 11 6 - 14 5 seconds    INR 0 96 0 84 - 1 19   APTT    Collection Time: 06/15/22 10:02 PM   Result Value Ref Range    PTT 33 23 - 37 seconds   NT-BNP PRO    Collection Time: 06/15/22 10:02 PM   Result Value Ref Range    NT-proBNP 166 (H) <125 pg/mL   Lipid Panel with Direct LDL reflex    Collection Time: 06/15/22 10:02 PM   Result Value Ref Range    Cholesterol 211 (H) See Comment mg/dL    Triglycerides 230 (H) See Comment mg/dL    HDL, Direct 42 >=40 mg/dL    LDL Calculated 123 (H) 0 - 100 mg/dL   HS Troponin 0hr (reflex protocol)    Collection Time: 06/16/22  2:29 AM   Result Value Ref Range    hs TnI 0hr 6 "Refer to ACS Flowchart"- see link ng/L   Lipid Panel with Direct LDL reflex    Collection Time: 06/16/22  6:38 AM   Result Value Ref Range    Cholesterol 211 (H) See Comment mg/dL    Triglycerides 142 See Comment mg/dL    HDL, Direct 47 >=40 mg/dL    LDL Calculated 136 (H) 0 - 100 mg/dL   Hemoglobin A1c w/EAG Estimation    Collection Time: 06/16/22  6:38 AM   Result Value Ref Range    Hemoglobin A1C 6 0 (H) Normal 3 8-5 6%; PreDiabetic 5 7-6 4%;  Diabetic >=6 5%; Glycemic control for adults with diabetes <7 0% %     mg/dl   Platelet count    Collection Time: 06/16/22  6:38 AM   Result Value Ref Range    Platelets 111 040 - 581 Thousands/uL    MPV 10 3 8 9 - 12 7 fL     Imaging: I have personally reviewed pertinent reports  EKG: NSR  VTE Prophylaxis: Enoxaparin (Lovenox)    Code Status: Level 1 - Full Code  Advance Directive and Living Will:      Power of :    POLST:      Counseling / Coordination of Care  Total floor / unit time spent today 70 minutes  Greater than 50% of total time was spent with the patient and / or family counseling and / or coordination of care

## 2022-06-16 NOTE — DISCHARGE INSTR - AVS FIRST PAGE
Incisional care: Shower daily  Wash incision daily w/ soap and water  Pat dry thoroughly    Allow skin glue to slough

## 2022-06-16 NOTE — ASSESSMENT & PLAN NOTE
79year old male with HTN, HLD, CAD s/p CABG, PAF presented to [address] on 6/15/2022 with transient neurologic deficits  He had two isolated episodes of neurologic symptoms: The first episode manifested as heaviness/numbness of the RLE, lasting about 1 hour  The second episode consisted of total R arm numbness, dysarthria, and word finding difficulty, lasting for 15 min  BP on arrival 220/11  CTH noted age indeterminate infarcts of the left caudate and left thalamus  CTA noted significant athersclerotic disease of the left proximal ICA  MRI lynsey ultimately revealed multifocal infarctions in the left frontal and left occipital lobes, within the L MCA territory  Suspect symptomatic L ICA stenosis  Patient underwent L CEA on 6/18/2022  Plan:  - Continue DAPT with ASA 81 mg QD, Brilinta 90 mg Q12H    - Continue on atorvastatin 80 mg QPM   - Telemetry  - Post-op care as per vascular surgery team   - PT/OT/ST as needed  - Medical management and supportive care per primary team  Correction of any metabolic or infectious disturbances

## 2022-06-16 NOTE — ASSESSMENT & PLAN NOTE
-Hx SHERIF x 2 in 2019   -Hx CABG in 2020   -Denies any chest pain   -troponin negative x 1, Denies any chest pain  -EKG: Normal sinus rhythm, ventricular rate 66, TX interval 154, , , right axis deviation, there are Q-waves in lead V2 suggestive of age-indeterminate septal infarct, there are T-wave abnormalities in lateral precordial as well as inferior leads, there is no stemi    -On pta Aspirin, Brillinta and metoprolol   Plan:  >Aspirin, Brillinta and Atorvastatin  Echo ordered as part of CVA pathway  Consider Cardiology consultation     >Hold pta metoprolol as allowing for permissive HTN

## 2022-06-16 NOTE — ASSESSMENT & PLAN NOTE
CTA head and neck reviewed demonstrating severe stenosis with essentially string sign of left proximal ICA  · Vascular consulted, continue DAPT and statin for now  · Carotid duplex pending  · NPO MN for possible carotid endarterectomy pending cards clearance

## 2022-06-16 NOTE — OCCUPATIONAL THERAPY NOTE
Occupational Therapy Evaluation     Patient Name: Niko Russo  SPKIJ'X Date: 6/16/2022  Problem List  Principal Problem:    TIA/Stroke  Active Problems:    S/P CABG (coronary artery bypass graft)    Mixed hyperlipidemia    Coronary artery disease involving native coronary artery of native heart without angina pectoris    Essential hypertension    Carotid artery stenosis    Thyroid nodule    Atrial fibrillation (HCC)    Elevated serum creatinine    Past Medical History  Past Medical History:   Diagnosis Date    Asthma     [facility] [address] [facility]     Coronary artery disease     HTN (hypertension)     Hyperlipidemia     [facility]    [facility] ([facility] )     TIA (transient ischemic attack)      Past Surgical History  Past Surgical History:   Procedure Laterality Date    APPENDECTOMY      CARDIAC CATHETERIZATION  04/17/2020    RCA: 80% distal lesion  LCX/OM2 stent placement  LAD with 70-80% lesion at D1 bifurcation   CARDIAC CATHETERIZATION  2019    OM2 and proximal LCX stenting   CATARACT EXTRACTION Right     CORONARY ARTERY BYPASS GRAFT      CORONARY STENT PLACEMENT  2019    OM2 and proximal LCX stenting      06/16/22 0953   OT Last Visit   OT Visit Date 06/16/22   Note Type   Note type Evaluation   Restrictions/Precautions   Weight Bearing Precautions Per Order No   Pain Assessment   Pain Assessment Tool 0-10   Pain Score No Pain   Home Living   Type of Home House   Home Layout Two level;1/2 bath on main level  (+3STE)   Bathroom Shower/Tub Tub/shower unit   Bathroom Toilet Standard   Bathroom Equipment Grab bars in shower   Bathroom Accessibility Accessible   Prior Function   Level of [place] Independent with ADLs and functional mobility   Lives With Spouse; Alone   Receives Help From Family   ADL Assistance Independent   IADLs Independent   Falls in the last 6 months 0   Vocational Full time employment   Comments pt lives with spouse in a [facility] with YOLANDA 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  Lifestyle   Autonomy I with ADL's/IADL's, no AD with functional ambulation, +drives   Reciprocal Relationships supportive spouse   Service to Others works full time as a  -private and school setttings   Intrinsic Gratification music   Psychosocial   Psychosocial (WDL) WDL   ADL   Eating Assistance 7  Independent   Grooming Assistance 7  Independent   UB Bathing Assistance 5  Supervision/Setup   LB Bathing Assistance 5  Supervision/Setup   UB Dressing Assistance 5  Supervision/Setup   LB Dressing Assistance 5  [address]  5  Supervision/Setup   Bed Mobility   Supine to Sit 6  Modified independent   Sit to Supine 6  Modified independent   Transfers   Sit to Stand 6  Modified independent   Stand to Sit 6  Modified independent   Additional Comments transfers without AD   Functional Mobility   Functional Mobility 5  Supervision   Additional Comments S with short distance functional mobility no LOB/BARRETO noted, limited mobility to HTN during session 201/109 RN aware and cleared for therapy   Additional items   (No AD)   Balance   Static Sitting Normal   Dynamic Sitting Fair +   Static Standing Fair +   Dynamic Standing Fair   Ambulatory Fair   Activity Tolerance   Activity Tolerance Patient tolerated treatment well   Medical Staff Made Aware PT ambrose due to the patient's co-morbidities, clinically unstable presentation, and present impairments which are a regression from the patient's baseline      Nurse Made Aware RN cleared pt for therapy   RUE Assessment   RUE Assessment WFL   LUE Assessment   LUE Assessment WFL   Hand Function   Gross Motor Coordination Functional   Fine Motor Coordination Functional  (WFL finger to thumb sequncing with B/L hands, right hand forward/backward and double taps with sequencing and good accuracy and speed )   Vision-Basic Assessment   Current Vision No visual deficits   Cognition Overall Cognitive Status [facility]   Arousal/Participation Alert; Responsive   Attention Within functional limits   Orientation Level Oriented X4   Memory Within functional limits   Following Commands Follows all commands and directions without difficulty   Comments pt motivated and pleasant to work with, demonstrated good cognition with memory, direction following and safety awareness  Assessment   Assessment Pt is a 79 y o  male who was admitted to [facility] on 6/16/2022 with tingling and loss of movement in R ring finger approx~1 week ago,Acute onset R lower leg numbness/weakness about 30 hours ago which completely resolved after 1 hour, At 1900 last night had entire R arm go numb and had weakness with resolution after 15 minutes  Then reported expressive aphasia which improved after 1 hour and now here with   TIA (transient ischemic attack), thyroid nodule, CAD, mixed hyperlipidemia,s/p CABG x2  in 2019   Pt's problem list also includes PMH of  has a past medical history of Asthma, Atrial fibrillation ([facility] [address] ), Coronary artery disease, HTN (hypertension), Hyperlipidemia, Myocardial infarction ([facility] [address] ) (02/2019), Stroke [facility]), and TIA (transient ischemic attack)    At baseline pt was completing I with ADL's/iaDl's, no AD with functional mobility, +drives, works full time Pt lives with supportive spouse in a [facility] with 3STE  Currently pt requires S/set-up for overall ADLS and mod I without AD for functional mobility/transfers  Pt currently presents with impairments in the following categories -difficulty performing IADLS    These impairments, as well as pt's fatigue and risk for falls  limit pt's ability to safely engage in all baseline areas of occupation, includingcommunity mobility, driving, house maintenance, work/volunteer work , social participation  and leisure activities  From OT standpoint, recommend home with increased family support and no DME needs upon D/C   No further acute OT needs indicated at this time - Anticipate d/c home with family support when medically cleared - d/c from caseload   Goals   Patient Goals go home   Recommendation   OT Discharge Recommendation No rehabilitation needs   OT - OK to Discharge Yes   AM-PAC Daily Activity Inpatient   Lower Body Dressing 4   Bathing 4   Toileting 4   Upper Body Dressing 4   Grooming 4   Eating 4   Daily Activity Raw Score 24   Daily Activity Standardized Score (Calc for Raw Score >=11) 57 54   AM-PAC Applied Cognition Inpatient   Following a Speech/Presentation 4   Understanding Ordinary Conversation 4   Taking Medications 4   Remembering Where Things Are Placed or Put Away 4   Remembering List of 4-5 Errands 4   Taking Care of Complicated Tasks 4   Applied Cognition Raw Score 24   Applied Cognition Standardized Score 62 21      Wendy Bashir MOT, OTR/L

## 2022-06-16 NOTE — ED PROVIDER NOTES
EMERGENCY DEPARTMENT ENCOUNTER NOTE    This note has been generated using a voice recognition software  There may be typographic, grammatic, or word substitution errors that have escaped editorial review  Emergency Department Note- Lina Gan 79 y o  male MRN: [id number]    Unit/Bed#: YX19 Encounter: [id number]  ? CHIEF COMPLAINT  Chief Complaint   Patient presents with    CVA/TIA-like Symptoms     Patient reports waking last night with left leg numbness  Leg numbness mostly resolved throughout the day  Patient then started having difficulty making words sound right aprx 45min ago  Also had episode of right arm numbness aprx 45min ago that lasted 15min  HPI  Lina Gan is a 79 y o  male with PMH of asthma, coronary artery disease with history of CABG, hypertension, hyperlipidemia, TIA, stroke, presenting with transient neurologic deficits  Patient had right leg numbness below the knee lasting 1 hour yesterday and his blood pressure was elevated  His cardiologist prescribed carvedilol for the patient to take due to markedly elevated blood pressures  Patient is not sure whether he has taken this medication  Today, 1 hour prior to arrival, patient developed difficulty with speech and right arm numbness  Symptoms lasted for 15 minutes and gradually improved  The symptoms are completely resolved at present, however, patient is hypertensive with blood pressure of 220/110  No chest pain, no shortness of breath  Per wife, patient has not taking his blood pressure medications and months, however, started again within the past 3 days  Patient reports taking baby aspirin this morning  He has also taken a medication that starts with P that works to prevent platelets from sticking together, when asked whether the medication is Plavix, patient says yes  Patient also reports being on Coumadin  Patient is a poor historian      Medical record reviewed, patient had a neurology appointment at the [address] on 06/13/2022  Per medical record, his neurologist evaluated him for right leg numbness that comes and goes with plan to obtain brain MRI, obtaining recent A1c and lipid profile  REVIEW OF SYSTEMS    Constitutional: denies fevers, chills  Visual/Eyes: no changes in vision  HENT: no rhinorrhea, no sore throat  Cardiac: no chest pain  History of cardiac disease  Respiratory: no shortness of breath, no cough  GI: no abdominal pain, nausea, vomiting, or diarrhea  : no burning with urination  Heme/Onc: no easy bruising  Endocrine: no diabetes  Neuro:  As above  History of TIA and stroke  Ten systems reviewed and negative unless otherwise noted in HPI and above    PAST MEDICAL HISTORY  Past Medical History:   Diagnosis Date    Asthma     [facility]rial fibrillation ([facility] )     Coronary artery disease     HTN (hypertension)     Hyperlipidemia     [facility] ) 02/2019    Stroke ([facility] )     TIA (transient ischemic attack)        SURGICAL HISTORY  Past Surgical History:   Procedure Laterality Date    APPENDECTOMY      CARDIAC CATHETERIZATION  04/17/2020    RCA: 80% distal lesion  LCX/OM2 stent placement  LAD with 70-80% lesion at D1 bifurcation   CARDIAC CATHETERIZATION  2019    OM2 and proximal LCX stenting   CATARACT EXTRACTION Right     CORONARY ARTERY BYPASS GRAFT      CORONARY STENT PLACEMENT  2019    OM2 and proximal LCX stenting       FAMILY HISTORY  Family History   Problem Relation Age of Onset    Heart disease Mother     Kidney disease Mother     Heart disease Father         CURRENT MEDICATIONS  No current facility-administered medications on file prior to encounter       Current Outpatient Medications on File Prior to Encounter   Medication Sig    albuterol (PROVENTIL HFA,VENTOLIN HFA) 90 mcg/act inhaler Inhale 2 puffs every 6 (six) hours as needed for wheezing    aspirin (ECOTRIN LOW STRENGTH) 81 mg EC tablet Take 81 mg by mouth daily    Brilinta 90 MG Take 1 tablet (60 mg total) by mouth every 12 (twelve) hours    ezetimibe (ZETIA) 10 mg tablet Take 10 mg by mouth daily Take one tablet by mouth once daily at night   losartan (COZAAR) 50 mg tablet Take 1 tablet (50 mg total) by mouth daily    metoprolol succinate (TOPROL-XL) 25 mg 24 hr tablet Take 1/2 tablets (12 5 mg total) by mouth daily    metoprolol tartrate (LOPRESSOR) 25 mg tablet Take 0 5 tablets (12 5 mg total) by mouth every 12 (twelve) hours    nitroglycerin (NITROSTAT) 0 4 mg SL tablet Place 1 tablet (0 4 mg total) under the tongue every 5 (five) minutes as needed for chest pain    Symbicort 160-4 5 MCG/ACT inhaler Inhale 2 puffs 2 (two) times a day Rinse mouth after use         ALLERGIES  Allergies   Allergen Reactions    Eggs Or Egg-Derived Products - Food Allergy Shortness Of Breath       SOCIAL HISTORY  Social History     Socioeconomic History    Marital status: /Civil Union     Spouse name: None    Number of children: None    Years of education: None    Highest education level: None   Occupational History    None   Tobacco Use    Smoking status: Never Smoker    Smokeless tobacco: Never Used   Vaping Use    Vaping Use: Never used   Substance and Sexual Activity    Alcohol use: Never    Drug use: Never    Sexual activity: Not Currently   Other Topics Concern    None   Social History Narrative    None     Social Determinants of Health     Financial Resource Strain: Not on file   Food Insecurity: Not on file   Transportation Needs: Not on file   Physical Activity: Not on file   Stress: Not on file   Social Connections: Not on file   Intimate Partner Violence: Not on file   Housing Stability: Not on file       PHYSICAL EXAM    BP (!) 228/110 (BP Location: Left arm)   Pulse 69   Temp 97 5 °F (36 4 °C) (Tympanic)   Resp 18   Wt 101 kg (223 lb 5 2 oz)   SpO2 97%   BMI 27 18 kg/m²   Vital signs and nursing notes reviewed  CONSTITUTIONAL: male appearing stated age resting in bed, in no acute distress  HEENT: atraumatic, normocephalic  Sclera anicteric, conjunctiva are not injected  Moist oral mucosa  CARDIOVASCULAR/CHEST: RRR, no M/R/G  2+ radial pulses  Healed sternotomy  PULMONARY: Breathing comfortably on RA  Breath sounds are equal and clear to auscultation  ABDOMEN: non-distended  BS present, normoactive  Non-tender  MSK: moves all extremities, no deformities, no peripheral edema, no calf asymmetry  NEURO: Awake, alert, and oriented x 3  Face symmetric  Moves all extremities spontaneously  No focal neurologic deficits  SKIN: Warm, appears well-perfused  MENTAL STATUS: Normal affect     Stroke Assessment     Row Name 06/15/22 2147             NIH Stroke Scale    Interval Baseline      Level of Consciousness (1a ) 0      LOC Questions (1b ) 0      LOC Commands (1c ) 0      Best Gaze (2 ) 0      Visual (3 ) 0      Facial Palsy (4 ) 0      Motor Arm, Left (5a ) 0      Motor Arm, Right (5b ) 0      Motor Leg, Left (6a ) 0      Motor Leg, Right (6b ) 0      Limb Ataxia (7 ) 0      Sensory (8 ) 0      Best Language (9 ) 0      Dysarthria (10 ) 0      Extinction and Inattention (11 ) (Formerly Neglect) 0      Total 0              Flowsheet Row Most Recent Value   TPA Decision Options    TPA Decision Patient not a TPA candidate  Patient is not a candidate options Symptoms resolved/clearly non disabling          ?  LABS AND TESTS    Results Reviewed     Procedure Component Value Units Date/Time    COVID/FLU/RSV - 2 hour TAT [[id number]]     Lab Status: No result Specimen: Nares from Nose     Hemoglobin A1C [[id number]]     Lab Status: No result Specimen: Blood     NT-BNP PRO [[id number]]  (Abnormal) Collected: 06/15/22 2202    Lab Status: Final result Specimen: Blood from Arm, Right Updated: 06/15/22 2238     NT-proBNP 166 pg/mL     Lipid Panel with Direct LDL reflex [[id number]]  (Abnormal) Collected: 06/15/22 2202    Lab Status: Final result Specimen: Blood from Arm, Right Updated: 06/15/22 2238     Cholesterol 211 mg/dL      Triglycerides 230 mg/dL      HDL, Direct 42 mg/dL      LDL Calculated 123 mg/dL     Comprehensive metabolic panel [[id number]]  (Abnormal) Collected: 06/15/22 2202    Lab Status: Final result Specimen: Blood from Arm, Right Updated: 06/15/22 2232     Sodium 140 mmol/L      Potassium 3 3 mmol/L      Chloride 103 mmol/L      CO2 30 mmol/L      ANION GAP 7 mmol/L      BUN 21 mg/dL      Creatinine 1 44 mg/dL      Glucose 162 mg/dL      Calcium 8 8 mg/dL      AST 18 U/L      ALT 27 U/L      Alkaline Phosphatase 81 U/L      Total Protein 6 9 g/dL      Albumin 3 8 g/dL      Total Bilirubin 1 53 mg/dL      eGFR 49 ml/min/1 73sq m     Narrative:      [place] guidelines for Chronic Kidney Disease (CKD):     Stage 1 with normal or high GFR (GFR > 90 mL/min/1 73 square meters)    Stage 2 Mild CKD (GFR = 60-89 mL/min/1 73 square meters)    Stage 3A Moderate CKD (GFR = 45-59 mL/min/1 73 square meters)    Stage 3B Moderate CKD (GFR = 30-44 mL/min/1 73 square meters)    Stage 4 Severe CKD (GFR = 15-29 mL/min/1 73 square meters)    Stage 5 End Stage CKD (GFR <15 mL/min/1 73 square meters)  Note: GFR calculation is accurate only with a steady state creatinine    Protime-INR [[id number]]  (Normal) Collected: 06/15/22 2202    Lab Status: Final result Specimen: Blood from Arm, Right Updated: 06/15/22 2228     Protime 12 3 seconds      INR 0 96    APTT [[id number]]  (Normal) Collected: 06/15/22 2202    Lab Status: Final result Specimen: Blood from Arm, Right Updated: 06/15/22 2228     PTT 33 seconds     CBC and differential [[id number]] Collected: 06/15/22 2202    Lab Status: Final result Specimen: Blood from Arm, Right Updated: 06/15/22 2218     WBC 7 03 Thousand/uL      RBC 5 08 Million/uL      Hemoglobin 14 8 g/dL      Hematocrit 46 1 %      MCV 91 fL      MCH 29 1 pg      MCHC 32 1 g/dL      RDW 13 0 %      MPV 9 6 fL      Platelets 995 Thousands/uL      nRBC 0 /100 WBCs      Neutrophils Relative 66 %      Immat GRANS % 0 %      Lymphocytes Relative 21 %      Monocytes Relative 9 %      Eosinophils Relative 3 %      Basophils Relative 1 %      Neutrophils Absolute 4 72 Thousands/µL      Immature Grans Absolute 0 01 Thousand/uL      Lymphocytes Absolute 1 46 Thousands/µL      Monocytes Absolute 0 62 Thousand/µL      Eosinophils Absolute 0 18 Thousand/µL      Basophils Absolute 0 04 Thousands/µL     Fingerstick Glucose (POCT) [[id number]]  (Abnormal) Collected: 06/15/22 2132    Lab Status: Final result Updated: 06/15/22 2133     POC Glucose 159 mg/dl           CTA head and neck with and without contrast   Final Result by Minna Gresham MD (06/15 2325)      Age indeterminate infarcts left caudate and left thalamus  Mild chronic microangiopathic changes  Suspect acute left maxillary sinusitis  Correlate clinically  No evidence of large vessel occlusion within the major vasculature of the [place]  Moderate stenosis within distal M2/perisylvian branch of the left MCA  Significant atherosclerotic disease left proximal cervical internal carotid artery resulting in severe stenosis/near total occlusion just distal to the ICA origin (greater than 90% by NASCET criteria)  Interventional/surgical consult advised  Incidental thyroid nodules for which nonemergent follow-up thyroid ultrasound is recommended  Above findings discussed with Dr Dany Dietz at 11:15 PM on 6/15/2022        Workstation performed: [id number]             ED [facility]  ECG 12 Lead Documentation Only    Date/Time: 6/15/2022 9:50 PM  Performed by: Leonela Durant MD  Authorized by: Leonela Durant MD     Comments:      Normal sinus rhythm, ventricular rate 66, DE interval 154, , , right axis deviation, there are Q-waves in lead V2 suggestive of age-indeterminate septal infarct, there are T-wave abnormalities in lateral precordial as well as inferior leads, there is no STEMI  Medications   labetalol (NORMODYNE) injection 10 mg (has no administration in time range)   labetalol (NORMODYNE) injection 10 mg (10 mg Intravenous Given 6/15/22 2154)   iohexol (OMNIPAQUE) 350 MG/ML injection (MULTI-DOSE) 65 mL (65 mL Intravenous Given 6/15/22 2221)   enoxaparin (LOVENOX) subcutaneous injection 100 mg (100 mg Subcutaneous Given 6/16/22 267)     55-year-old male presenting with an episode of difficulty with speech and right arm numbness/sensation of arm falling asleep that occurred 1 hour prior to arrival, lasted for 15 minutes, completely resolved  Symptoms occurred around 8:00 p m     Vital signs reviewed, patient is quite hypertensive with blood pressure of 228 over 110  Differential diagnosis includes hypertensive emergency, hypertensive urgency, transient ischemic attack, versus another etiology of symptoms  EKG obtained, as reviewed by me, as above  Labs revealed with of care glucose is but 59  CMP is with creatinine of 1 44 consistent with acute kidney injury compared to prior, mild hypokalemia of 3 3  Lipid profile reveals elevated LDL and triglyceride levels  CBCs unremarkable  Troponin pending  Coags are within normal limits  Labetalol 10 mg IV administered given systolic blood pressure 814, blood pressure is now down to 196/86  Patient continues to be asymptomatic from neurologic standpoint  ED Course as of 06/16/22 0215   Wed Darci 15, 2022   2250 Acute kidney injury   2250 Creatinine(!): 1 44   2320 Per Dr Dick Gandhi with radiology: age indeterminate infarct in left caudate and left thalamus, has severe stenosis (near total occlusion) of cervical internal carotid artery on the left  2325 Reaching out to Neurology Dr Joshua Garcia  Thu Jun 16, 2022   0001 Dr Joshua Garcia let me know he is not on call tonight, reaching out to Dr Baldomero Coronado     0907 Per Dr Baldomero Coronado, "Would maintain DAPT  [place] sounds appropriate-can get AP efficacy checked w P2Y12 and asa sensitivity  I would likely give him a single dose of lovenox 1mg/kg  Vasc surg consult urgently tomorrow"   9237 Per neurology, patient is to have permissive hypertension with systolic blood pressure up to 220     0120 Per Dr Stan Carranza with [facility] Vascular surgery, recommends transfer and admission to [facility], no indication for emergent endarterectomy  Patient accepted for transfer to [facility] step-down [employer]  Given 2 transient ischemic attacks in 24 hours in setting of severe stenosis of the left ICA, we will attempt to transfer patient to [address] [place] [address] since his next neurologic deficit make her at any time and may be persistent and with a significant morbidity/mortality for the patient      MDM  Number of Diagnoses or Management Options  Stenosis of left carotid artery: new and requires workup  TIA (transient ischemic attack): new and requires workup     Amount and/or Complexity of Data Reviewed  Clinical lab tests: ordered and reviewed  Tests in the radiology section of CPT®: ordered and reviewed  Tests in the medicine section of CPT®: ordered and reviewed  Review and summarize past medical records: yes  Discuss the patient with other providers: yes (Neurology, vascular surgery)  Independent visualization of images, tracings, or specimens: yes    Risk of Complications, Morbidity, and/or Mortality  Presenting problems: high  Diagnostic procedures: high  Management options: high    Patient Progress  Patient progress: improved      CLINICAL IMPRESSION  Final diagnoses:   Stenosis of left carotid artery   TIA (transient ischemic attack)       DISPOSITION  Time reflects when diagnosis was documented in both MDM as applicable and the Disposition within this note     Time User Action Codes Description Comment    6/16/2022  1:25 AM Anny Schwartz [I65 22] Stenosis of left carotid artery     6/16/2022  1:25 AM Anny Rangel Add [G45 9] TIA (transient ischemic attack)       ED Disposition     ED Disposition   Transfer to Another Facility-In Network    Condition   --    Date/Time   Thu Jun 16, 2022 12:14 AM    Comment   Jeferson serina should be transferred out to [facility]             MD Documentation    [address] Most Recent Value   Patient Condition The patient has been stabilized such that within reasonable medical probability, no material deterioration of the patient condition or the condition of the unborn child(alexa) is likely to result from the transfer   Reason for Transfer Level of Care needed not available at this facility   Benefits of Transfer Specialized equipment and/or services available at the receiving facility (Include comment)________________________   Risks of Transfer Potential for delay in receiving treatment, Potential deterioration of medical condition, Loss of IV, Increased discomfort during transfer, Possible worsening of condition or death during transfer   Accepting Physician Dr Liset Baez Name, [facility]   Sending MD Dr Damien Dowd      RN Documentation    [address], [address] [address]          Ray Pugh MD  06/16/22 0146

## 2022-06-16 NOTE — CONSULTS
Consultation - Lloyd Favre 79 y o  male MRN: [id number]  Unit/Bed#: [facility] 717-01 Encounter: [id number]      Assessment/Plan      Assessment:  67M  with Afib (not on anticoagulation), HTN, HLD, history of MI (s/p DESx2 followed by CABG x1 4/2020), history of TIA presenting to [facility] ED with right arm paresthesias and slurred speech  CTA head and neck reviewed demonstrating severe stenosis with essentially string sign of left proximal ICA  It appears that there some flow through this tight lesion, we will further delineate with carotid duplex  Plan:  Continue neurovascular checks  Neurology consultation-MRI brain and TTE pending  Will obtain carotid duplex  Will need cardiology risk stratification for carotid endarterectomy, this is not amenable to TCAR  Consider dual antiplatelet therapy, unclear if patient was actually on Plavix looks like he may have been on Brilinta  Vascular surgery following      History of Present Illness   Physician Requesting Consult: Brigida Aviles MD  Reason for Consult / Principal Problem:  TIA    HPI: Dash Hernandez is a 79y o  year old male  with past medical history significant for Afib (not on anticoagulation), HTN, HLD, history of MI (s/p DESx2 followed by CABG x1 4/2020), history of TIA presenting to [facility] ED with right arm paresthesias and slurred speech  These symptoms have since resolved and patient was transferred to [facility] for further evaluation and management  Workup included CTA head and neck demonstrating severe stenosis with essentially string sign of left proximal ICA  There is moderate stenosis of distal M2 and chronic infarcts of left caudate and left thalamus  Patient reports 2 days ago he experienced right leg paresthesias for which he was planning to follow with neurology for further workup  This was self-limited after an hour    He then subsequently experienced right arm paresthesias with slurred speech prompting his visit to the ED  He reports he had similar symptoms back in 2019 prior to his MI  At that time, he was evaluated but reports no further workup was performed  Patient reports that he is taking aspirin and Plavix  However, most recent cardiology visit reports he is on [address]  Inpatient consult to Vascular Surgery  Consult performed by: Alysha Lyn MD  Consult ordered by: Lisette Nielsen DO          Review of Systems   All other systems reviewed and are negative  Historical Information   Past Medical History:   Diagnosis Date    Asthma     [facility]     Coronary artery disease     HTN (hypertension)     Hyperlipidemia     [facility] 02/2019    Stroke ([facility] )     TIA (transient ischemic attack)      Past Surgical History:   Procedure Laterality Date    APPENDECTOMY      CARDIAC CATHETERIZATION  04/17/2020    RCA: 80% distal lesion  LCX/OM2 stent placement  LAD with 70-80% lesion at D1 bifurcation   CARDIAC CATHETERIZATION  2019    OM2 and proximal LCX stenting      CATARACT EXTRACTION Right     CORONARY ARTERY BYPASS GRAFT      CORONARY STENT PLACEMENT  2019    OM2 and proximal LCX stenting     Social History   Social History     Substance and Sexual Activity   Alcohol Use Never     Social History     Substance and Sexual Activity   Drug Use Never     E-Cigarette/Vaping    E-Cigarette Use Never User      E-Cigarette/Vaping Substances    Nicotine No     THC No     CBD No     Flavoring No     Other No     Unknown No      Social History     Tobacco Use   Smoking Status Never Smoker   Smokeless Tobacco Never Used     Family History: non-contributory    Meds/Allergies   all current active meds have been reviewed    Allergies   Allergen Reactions    Eggs Or Egg-Derived Products - Food Allergy Shortness Of Breath       Objective   No intake or output data in the 24 hours ending 06/16/22 0750    Invasive Devices  Report    Peripheral Intravenous Line  Duration Peripheral IV 06/15/22 Right Antecubital <1 day                Physical Exam:    General: No acute distress  CV: Normal rate  Respiratory: Non-labored breathing  Abdominal: Soft  Extremities: Warm  Neurologic: Alert    Symmetric smile, no tongue deviation, bilateral upper and lower extremity strength 5/5

## 2022-06-16 NOTE — PROGRESS NOTES
Anel 89 1955, 79 y o  male MRN: [id number]  [facility] Encounter: [id number]  Primary Care Provider: No primary care provider on file  Date and time admitted to hospital: 6/16/2022  5:43 AM    [facility] Internal Medicine Post Admit Check:     Date of visit: 06/16/22    The patient was admitted earlier to the [facility] Internal Medicine Service  Please see initial intake history and physical for detailed admission history  This is a supplemental update following a post admit checkup  Subjective: Doing much better  Some mild RUE paresthesias but nothing significant  Speech normal  No headaches, cp, SOB, dizziness  Exam:   Gen: awake, alert, oriented  No acute distress  Neuro: speech WNL, non-focal  CV: RRR  Lungs: CTAB    Assessment and Plan:   * TIA/Stroke  Assessment & Plan  Presented with with R arm paresthesias and slurred speech to [facility], transferred to [facility] for further evaluation after being found to have severe carotid stenosis   Per neurology is TIA vs small stroke, likely from symptomatic L carotid  · CTA head and neck with and without contrast showed age indeterminate infarcts L caudate, L thalamus, no large vessel occlusion, moderate stenosis of distal M2, significant  atherosclerotic disease left proximal cervical internal carotid artery resulting in severe stenosis/near total occlusion just distal to the ICA origin   · MRI brain pending  · Goal SBP around 160 per neurology, however cautious lowering--see below   · Echo pending, carotid duplex pending  · Lipid Panel - Trig 142, Chol 211, LDL 136H  · Hemoglobin A1c - 6 0  · On DAPT, Aspirin 81 mg and Brilinta, the patient reports he was not taking Brillinta at home  · Atorvastatin 40 mg  · Telemetry ordered  · Vascular/Cards following  · PT/OT/ST    Carotid artery stenosis  Assessment & Plan  CTA head and neck reviewed demonstrating severe stenosis with essentially string sign of left proximal ICA  · Vascular consulted, continue DAPT and statin for now  · Carotid duplex pending  · NPO MN for possible carotid endarterectomy pending cards clearance     Essential hypertension  Assessment & Plan  With hypertensive urgency upon arrival  Upon my eval this AM, /105  · D/w neuro and cards, goal SBP around 160, with cautious lowering given carotid stenosis/TIA/Stroke  · Start Coreg 6 25 mg BID in place of home long acting Coreg  · Hold home losartan  · Consideration for additional of Norvasc later today if still above goal    Coronary artery disease involving native coronary artery of native heart without angina pectoris  Assessment & Plan  CAD - s/p SHERIF lt circ 2019, SHERIF OM1 and distal RCA with robotic CABG- LIMA to LAD-diagonal 4/2020  · Cardiology consulted in setting of clearance for eventual carotid endarterectomy   · On DAPT, statin  Start Coreg at 6 25 mg BID per cards, typically on long acting Coreg at home    Elevated serum creatinine  Assessment & Plan  Suspect some element of CKD, no prior labs in our system  Able to find labs 4/2020 from [facility] where creatine was 1 1    · 1 4 on arrival, baseline? · Monitor BMP  · Avoid hypotension  · Holding losartan     Atrial fibrillation (HCC)  Assessment & Plan  PAF, not typically on AC  · Reportedly was recently switched from lopressor to coreg, continue coreg for now  · Telemetry  · [facility] recs per cards   Is on DAPT currently     Thyroid nodule  Assessment & Plan  Noted on CTA H&N  · Outpatient thyroid U/S recommended     Mixed hyperlipidemia  Assessment & Plan  Lipid Panel - Trig 142, Chol 211, LDL 136H  · Continue Lipitor 40 daily     S/P CABG (coronary artery bypass graft)  Assessment & Plan  4/2020  · Plan as above under CAD  · Cards consulted     Genet Baez PA-C

## 2022-06-17 ENCOUNTER — APPOINTMENT (INPATIENT)
Dept: RADIOLOGY | Facility: HOSPITAL | Age: 67
DRG: 039 | End: 2022-06-17
Payer: COMMERCIAL

## 2022-06-17 PROBLEM — E87.6 HYPOKALEMIA: Status: ACTIVE | Noted: 2022-06-17

## 2022-06-17 PROBLEM — I63.9 ACUTE CVA (CEREBROVASCULAR ACCIDENT) (HCC): Status: ACTIVE | Noted: 2022-06-16

## 2022-06-17 LAB
ABO GROUP BLD: NORMAL
ABO GROUP BLD: NORMAL
ALBUMIN SERPL BCP-MCNC: 3.3 G/DL (ref 3.5–5)
ALP SERPL-CCNC: 64 U/L (ref 46–116)
ALT SERPL W P-5'-P-CCNC: 19 U/L (ref 12–78)
ANION GAP SERPL CALCULATED.3IONS-SCNC: 4 MMOL/L (ref 4–13)
AST SERPL W P-5'-P-CCNC: 12 U/L (ref 5–45)
BASOPHILS # BLD AUTO: 0.03 THOUSANDS/ΜL (ref 0–0.1)
BASOPHILS NFR BLD AUTO: 0 % (ref 0–1)
BILIRUB SERPL-MCNC: 2.63 MG/DL (ref 0.2–1)
BLD GP AB SCN SERPL QL: NEGATIVE
BUN SERPL-MCNC: 14 MG/DL (ref 5–25)
CALCIUM ALBUM COR SERPL-MCNC: 9.2 MG/DL (ref 8.3–10.1)
CALCIUM SERPL-MCNC: 8.6 MG/DL (ref 8.3–10.1)
CHLORIDE SERPL-SCNC: 105 MMOL/L (ref 100–108)
CO2 SERPL-SCNC: 30 MMOL/L (ref 21–32)
CREAT SERPL-MCNC: 0.98 MG/DL (ref 0.6–1.3)
EOSINOPHIL # BLD AUTO: 0.14 THOUSAND/ΜL (ref 0–0.61)
EOSINOPHIL NFR BLD AUTO: 2 % (ref 0–6)
ERYTHROCYTE [DISTWIDTH] IN BLOOD BY AUTOMATED COUNT: 13.1 % (ref 11.6–15.1)
GFR SERPL CREATININE-BSD FRML MDRD: 79 ML/MIN/1.73SQ M
GLUCOSE SERPL-MCNC: 99 MG/DL (ref 65–140)
HCT VFR BLD AUTO: 48.6 % (ref 36.5–49.3)
HGB BLD-MCNC: 15.9 G/DL (ref 12–17)
IMM GRANULOCYTES # BLD AUTO: 0.03 THOUSAND/UL (ref 0–0.2)
IMM GRANULOCYTES NFR BLD AUTO: 0 % (ref 0–2)
LYMPHOCYTES # BLD AUTO: 1.18 THOUSANDS/ΜL (ref 0.6–4.47)
LYMPHOCYTES NFR BLD AUTO: 14 % (ref 14–44)
MAGNESIUM SERPL-MCNC: 2.5 MG/DL (ref 1.6–2.6)
MCH RBC QN AUTO: 29.7 PG (ref 26.8–34.3)
MCHC RBC AUTO-ENTMCNC: 32.7 G/DL (ref 31.4–37.4)
MCV RBC AUTO: 91 FL (ref 82–98)
MONOCYTES # BLD AUTO: 0.72 THOUSAND/ΜL (ref 0.17–1.22)
MONOCYTES NFR BLD AUTO: 9 % (ref 4–12)
NEUTROPHILS # BLD AUTO: 6.13 THOUSANDS/ΜL (ref 1.85–7.62)
NEUTS SEG NFR BLD AUTO: 75 % (ref 43–75)
NRBC BLD AUTO-RTO: 0 /100 WBCS
PLATELET # BLD AUTO: 205 THOUSANDS/UL (ref 149–390)
PMV BLD AUTO: 9.6 FL (ref 8.9–12.7)
POTASSIUM SERPL-SCNC: 2.9 MMOL/L (ref 3.5–5.3)
PROT SERPL-MCNC: 6.4 G/DL (ref 6.4–8.2)
RBC # BLD AUTO: 5.35 MILLION/UL (ref 3.88–5.62)
RH BLD: NEGATIVE
RH BLD: NEGATIVE
SODIUM SERPL-SCNC: 139 MMOL/L (ref 136–145)
SPECIMEN EXPIRATION DATE: NORMAL
WBC # BLD AUTO: 8.23 THOUSAND/UL (ref 4.31–10.16)

## 2022-06-17 PROCEDURE — 86901 BLOOD TYPING SEROLOGIC RH(D): CPT | Performed by: PHYSICIAN ASSISTANT

## 2022-06-17 PROCEDURE — 86850 RBC ANTIBODY SCREEN: CPT | Performed by: PHYSICIAN ASSISTANT

## 2022-06-17 PROCEDURE — 86900 BLOOD TYPING SEROLOGIC ABO: CPT | Performed by: PHYSICIAN ASSISTANT

## 2022-06-17 PROCEDURE — G1004 CDSM NDSC: HCPCS

## 2022-06-17 PROCEDURE — 99232 SBSQ HOSP IP/OBS MODERATE 35: CPT | Performed by: INTERNAL MEDICINE

## 2022-06-17 PROCEDURE — 80053 COMPREHEN METABOLIC PANEL: CPT | Performed by: INTERNAL MEDICINE

## 2022-06-17 PROCEDURE — NC001 PR NO CHARGE: Performed by: PHYSICIAN ASSISTANT

## 2022-06-17 PROCEDURE — 85025 COMPLETE CBC W/AUTO DIFF WBC: CPT | Performed by: INTERNAL MEDICINE

## 2022-06-17 PROCEDURE — 83735 ASSAY OF MAGNESIUM: CPT | Performed by: INTERNAL MEDICINE

## 2022-06-17 PROCEDURE — 70551 MRI BRAIN STEM W/O DYE: CPT

## 2022-06-17 PROCEDURE — 99024 POSTOP FOLLOW-UP VISIT: CPT | Performed by: SURGERY

## 2022-06-17 PROCEDURE — 99232 SBSQ HOSP IP/OBS MODERATE 35: CPT | Performed by: PSYCHIATRY & NEUROLOGY

## 2022-06-17 PROCEDURE — 84132 ASSAY OF SERUM POTASSIUM: CPT | Performed by: INTERNAL MEDICINE

## 2022-06-17 RX ORDER — POTASSIUM CHLORIDE 20 MEQ/1
40 TABLET, EXTENDED RELEASE ORAL ONCE
Status: COMPLETED | OUTPATIENT
Start: 2022-06-17 | End: 2022-06-17

## 2022-06-17 RX ORDER — POTASSIUM CHLORIDE 14.9 MG/ML
20 INJECTION INTRAVENOUS
Status: COMPLETED | OUTPATIENT
Start: 2022-06-17 | End: 2022-06-18

## 2022-06-17 RX ORDER — BUDESONIDE AND FORMOTEROL FUMARATE DIHYDRATE 160; 4.5 UG/1; UG/1
2 AEROSOL RESPIRATORY (INHALATION) 2 TIMES DAILY
Status: DISCONTINUED | OUTPATIENT
Start: 2022-06-17 | End: 2022-06-21 | Stop reason: HOSPADM

## 2022-06-17 RX ADMIN — TICAGRELOR 90 MG: 90 TABLET ORAL at 08:03

## 2022-06-17 RX ADMIN — CARVEDILOL 6.25 MG: 6.25 TABLET, FILM COATED ORAL at 17:05

## 2022-06-17 RX ADMIN — CARVEDILOL 6.25 MG: 6.25 TABLET, FILM COATED ORAL at 07:42

## 2022-06-17 RX ADMIN — POTASSIUM CHLORIDE 20 MEQ: 14.9 INJECTION, SOLUTION INTRAVENOUS at 09:55

## 2022-06-17 RX ADMIN — SODIUM CHLORIDE 75 ML/HR: 0.9 INJECTION, SOLUTION INTRAVENOUS at 00:54

## 2022-06-17 RX ADMIN — POTASSIUM CHLORIDE 20 MEQ: 14.9 INJECTION, SOLUTION INTRAVENOUS at 12:26

## 2022-06-17 RX ADMIN — BUDESONIDE AND FORMOTEROL FUMARATE DIHYDRATE 2 PUFF: 160; 4.5 AEROSOL RESPIRATORY (INHALATION) at 17:05

## 2022-06-17 RX ADMIN — POTASSIUM CHLORIDE 40 MEQ: 1500 TABLET, EXTENDED RELEASE ORAL at 09:55

## 2022-06-17 RX ADMIN — TICAGRELOR 90 MG: 90 TABLET ORAL at 21:24

## 2022-06-17 RX ADMIN — ATORVASTATIN CALCIUM 80 MG: 80 TABLET, FILM COATED ORAL at 17:05

## 2022-06-17 RX ADMIN — ASPIRIN 81 MG: 81 TABLET, COATED ORAL at 08:03

## 2022-06-17 RX ADMIN — BUDESONIDE AND FORMOTEROL FUMARATE DIHYDRATE 2 PUFF: 160; 4.5 AEROSOL RESPIRATORY (INHALATION) at 08:03

## 2022-06-17 NOTE — PROGRESS NOTES
Vascular Surgery  Progress Note    Due to OR schedule and logistics of availability to start operative case, OR for L CEA cancelled for today  Rescheduled for am of 6/18/22  Pt seen along w/ Dr Richie Jimenez  Agreeable to re-scheduling  All Q's answered  --diet ordered  --NPO (midn)    Pt was "doing his yoga" upon our arrival to the room        Cody Maldonado PA-C   6/17/2022

## 2022-06-17 NOTE — PLAN OF CARE
Problem: Nutrition/Hydration-ADULT  Goal: Nutrient/Hydration intake appropriate for improving, restoring or maintaining nutritional needs  Description: Monitor and assess patient's nutrition/hydration status for malnutrition  Collaborate with interdisciplinary team and initiate plan and interventions as ordered  Monitor patient's weight and dietary intake as ordered or per policy  Utilize nutrition screening tool and intervene as necessary  Determine patient's food preferences and provide high-protein, high-caloric foods as appropriate       INTERVENTIONS:  - Monitor oral intake, urinary output, labs, and treatment plans  - Assess nutrition and hydration status and recommend course of action  - Evaluate amount of meals eaten  - Assist patient with eating if necessary   - Allow adequate time for meals  - Recommend/ encourage appropriate diets, oral nutritional supplements, and vitamin/mineral supplements  - Order, calculate, and assess calorie counts as needed  - Recommend, monitor, and adjust tube feedings and TPN/PPN based on assessed needs  - Assess need for intravenous fluids  - Provide specific nutrition/hydration education as appropriate  - Include patient/family/caregiver in decisions related to nutrition  Outcome: Progressing     Problem: PAIN - ADULT  Goal: Verbalizes/displays adequate comfort level or baseline comfort level  Description: Interventions:  - Encourage patient to monitor pain and request assistance  - Assess pain using appropriate pain scale  - Administer analgesics based on type and severity of pain and evaluate response  - Implement non-pharmacological measures as appropriate and evaluate response  - Consider cultural and social influences on pain and pain management  - Notify physician/advanced practitioner if interventions unsuccessful or patient reports new pain  Outcome: Progressing     Problem: INFECTION - ADULT  Goal: Absence or prevention of progression during hospitalization  Description: INTERVENTIONS:  - Assess and monitor for signs and symptoms of infection  - Monitor lab/diagnostic results  - Monitor all insertion sites, i e  indwelling lines, tubes, and drains  - Monitor endotracheal if appropriate and nasal secretions for changes in amount and color  - [place] appropriate cooling/warming therapies per order  - Administer medications as ordered  - Instruct and encourage patient and family to use good hand hygiene technique  - Identify and instruct in appropriate isolation precautions for identified infection/condition  Outcome: Progressing  Goal: Absence of fever/infection during neutropenic period  Description: INTERVENTIONS:  - Monitor WBC    Outcome: Progressing     Problem: SAFETY ADULT  Goal: Patient will remain free of falls  Description: INTERVENTIONS:  - Educate patient/family on patient safety including physical limitations  - Instruct patient to call for assistance with activity   - Consult OT/PT to assist with strengthening/mobility   - Keep Call bell within reach  - Keep bed low and locked with side rails adjusted as appropriate  - Keep care items and personal belongings within reach  - Initiate and maintain comfort rounds  - Make Fall Risk Sign visible to staff  - Offer Toileting every  Hours, in advance of need  - Initiate/Maintainalarm  - Obtain necessary fall risk management equipment:  - Apply yellow socks and bracelet for high fall risk patients  - Consider moving patient to room near nurses station  Outcome: Progressing  Goal: Maintain or return to baseline ADL function  Description: INTERVENTIONS:  -  Assess patient's ability to carry out ADLs; assess patient's baseline for ADL function and identify physical deficits which impact ability to perform ADLs (bathing, care of mouth/teeth, toileting, grooming, dressing, etc )  - Assess/evaluate cause of self-care deficits   - Assess range of motion  - Assess patient's mobility; develop plan if impaired  - Assess patient's need for assistive devices and provide as appropriate  - Encourage maximum independence but intervene and supervise when necessary  - Involve family in performance of ADLs  - Assess for home care needs following discharge   - Consider OT consult to assist with ADL evaluation and planning for discharge  - Provide patient education as appropriate  Outcome: Progressing  Goal: Maintains/Returns to pre admission functional level  Description: INTERVENTIONS:  - Perform BMAT or MOVE assessment daily    - Set and communicate daily mobility goal to care team and patient/family/caregiver  - Collaborate with rehabilitation services on mobility goals if consulted  - Perform Range of Motion  times a day  - Reposition patient every  hours    - Dangle patient  times a day  - Stand patient  times a day  - Ambulate patient  times a day  - Out of bed to chair times a day   - Out of bed for meal times a day  - Out of bed for toileting  - Record patient progress and toleration of activity level   Outcome: Progressing     Problem: DISCHARGE PLANNING  Goal: Discharge to home or other facility with appropriate resources  Description: INTERVENTIONS:  - Identify barriers to discharge w/patient and caregiver  - Arrange for needed discharge resources and transportation as appropriate  - Identify discharge learning needs (meds, wound care, etc )  - Arrange for interpretive services to assist at discharge as needed  - Refer to Case Management Department for coordinating discharge planning if the patient needs post-hospital services based on physician/advanced practitioner order or complex needs related to functional status, cognitive ability, or social support system  Outcome: Progressing     Problem: Knowledge Deficit  Goal: Patient/family/caregiver demonstrates understanding of disease process, treatment plan, medications, and discharge instructions  Description: Complete learning assessment and assess knowledge base   Interventions:  - Provide teaching at level of understanding  - Provide teaching via preferred learning methods  Outcome: Progressing

## 2022-06-17 NOTE — PROGRESS NOTES
Progress Note - Neurology   Marceline Schilder 79 y o  male MRN: [id number]  Unit/Bed#: [facility] 717-01 Encounter: [id number]      Assessment/Plan     * Acute CVA (cerebrovascular accident) [facility])  Assessment & Plan  79year old male with PAF as well as multiple vascular risk factors presented to [facility] on 6/15/22 with transient neurologic deficits  He had two isolated episodes of neurologic symptoms: The first episode manifested as heaviness/numbness of the RLE, lasting about 1 hour  The second episode consisted of total R arm numbness, dysarthria, and word finding difficulty, lasting for 15 min  BP on arrival 220/11  CTH noted age indeterminate infarcts of the left caudate and left thalamus  CTA noted significant athersclerotic disease of the left proximal ICA  MRI lynsey ultimately revealed multifocal infarctions in the left frontal and left occipital lobes, within the L MCA territory  Suspect symptomatic L ICA stenosis  Plan:  - Will be undergoing CEA this afternoon   - Continue DAPT and statin  - Telemetry  - PT/OT/ST as needed  - Medical management and supportive care per primary team  Correction of any metabolic or infectious disturbances  Carotid artery stenosis  Assessment & Plan  As above      Marceline Schilder will need follow up in in 6 weeks with neurovascular attending or advance practitioner  He will not require outpatient neurological testing  Subjective: Today, the patient is doing well  Has not experienced repeat whole lower right leg numbness or right arm numbness  He states that when it occurred, it felt like a generalized heaviness of the lower leg, like there was a weight attached to it  Has not had any trouble with speech or word finding  Still has right foot/ankle numbness, however he states someone told him it may be neuropathy  Patient has not experienced any severe headaches, vertigo, nausea/vomiting   Stated carotid endarterectomy will be performed around 3 or 4 pm  Patient concerned for long-standing neuro deficits with fine motor and sensory function in his right hand  Worries may affect his guitar playing abilities  Vitals: Blood pressure (!) 186/84, pulse 57, temperature (!) 97 2 °F (36 2 °C), height 6' 4" (1 93 m), weight 101 kg (223 lb), SpO2 98 %  ,Body mass index is 27 14 kg/m²  Physical Exam:   Vital signs and nursing notes reviewed  Constitutional: Appears comfortable, sitting up in chair  Well developed/well nourished  No diaphoresis  No acute distress  HENT: Normocephalic, atraumatic  B/L external ears and nose appears normal  Oropharynx clear and moist    Eyes: EOMs intact without nystagmus  No scleral icterus or injection  No discharge  Neck: Supple, normal ROM  No stridor noted  Cardiovascular: Regular rate  Pulmonary: No respiratory distress  Effort normal  No stridor or wheezing evident  Musculoskeletal: Normal ROM  No tenderness, edema, or deformities noted  Neurological: Detailed below  Skin: Warm and dry  No erythema or rashes  No jaundice  Psychiatric: Normal mood and affect, normal thought process/thought content  No hallucinations  Good insight  Neurologic Exam     Mental Status   Oriented to person, place, and time  Registration: recalls 2 of 3 objects  Follows 2 step commands  Attention: normal  Concentration: normal    Speech: speech is normal   Level of consciousness: alert  Knowledge: good  Able to name object  Able to read  Able to write  Normal comprehension  Cranial Nerves   Cranial nerves II through XII intact  CN II   Visual fields full to confrontation  Visual acuity: normal  Right visual field deficit: none  Left visual field deficit: none     CN III, IV, VI   Pupils are equal, round, and reactive to light  Extraocular motions are normal    Right pupil: Size: 2 mm  Consensual response: intact  Accommodation: intact  Left pupil: Size: 2 mm     Nystagmus: none   Diplopia: none  Ophthalmoparesis: none    CN V   Facial sensation intact  Right facial sensation deficit: none  Left facial sensation deficit: none    CN VII   Facial expression full, symmetric  Right facial weakness: none  Left facial weakness: none    CN VIII   CN VIII normal      CN IX, X   CN IX normal      CN XI   CN XI normal      CN XII   CN XII normal      Motor Exam   Muscle bulk: normal  Overall muscle tone: normal    Strength   Strength 5/5 throughout  Sensory Exam   Right arm light touch: normal  Left arm light touch: normal  Right leg light touch: decreased from ankle  Left leg light touch: normal  Right arm vibration: normal  Left arm vibration: normal  Right leg vibration: decreased from ankle  Left leg vibration: normal  Patient has decreased temperature/sensation of the right foot/ankle up to the right knee  Gait, Coordination, and Reflexes     Gait  Gait: (Deferred)    Coordination   Finger to nose coordination: normal  Heel to shin coordination: normal    Tremor   Resting tremor: absent  Intention tremor: absent    Reflexes   Right brachioradialis: 2+  Left brachioradialis: 2+  Right biceps: 2+  Left biceps: 2+  Right triceps: 2+  Left triceps: 2+  Right patellar: 2+  Left patellar: 2+  Right achilles: 2+  Left achilles: 2+      Lab, Imaging and other studies: I have personally reviewed pertinent reports  (including CTA, MRI)  VTE Prophylaxis: Sequential compression device (Venodyne)       Counseling / Coordination of Care  Total time spent today 25 minutes  Greater than 50% of total time was spent with the patient and/or family counseling and/or coordination of care  A description of the counseling/coordination of care:  Patient was seen and evaluated  Discussed imaging results, causes of stroke  Chart reviewed thoroughly including laboratory and imaging studies    Plan of care discussed with attending neurologist and primary team

## 2022-06-17 NOTE — ASSESSMENT & PLAN NOTE
CAD - s/p SHERIF lt circ 2019, SHERIF OM1 and distal RCA with robotic CABG- LIMA to LAD-diagonal 4/2020  · Continue aspirin statin Brilinta beta-blocker  · Cardiology input noted

## 2022-06-17 NOTE — PROGRESS NOTES
[address] [facility]  Progress Note - Niko Im 1955, 79 y o  male MRN: [id number]  Unit/Bed#: [facility] 717-01 Encounter: [id number]  Primary Care Provider: No primary care provider on file  Date and time admitted to hospital: 6/16/2022  5:43 AM    * Acute CVA (cerebrovascular accident) [facility])  Assessment & Plan  MRI brain imaging reveals multifocal small infarcts left frontal and left occipital lobes  Imaging studies revealed severe left proximal internal carotid artery stenosis  Continue aspirin Brilinta statin  Neurology input noted  Vascular surgery plans for carotid artery endarterectomy noted  Physical therapy    Carotid artery stenosis  Assessment & Plan  CTA head and neck severe stenosis left proximal ICA  Continue aspirin statin Brilinta  Vascular plans for carotid endarterectomy noted    Hypokalemia  Assessment & Plan  Replete  Monitor    Elevated serum creatinine  Assessment & Plan  Suspect some element of CKD, no prior labs in our system     · Monitor kidney function closely  · Avoid hypotension  · Monitor postvoid residuals    Atrial fibrillation [facility])  Assessment & Plan  Cardiology notes reviewed -  History of PAF post MI and spontaneously converted to sinus rhythm  Presently not on anticoagulation  Cardiology input noted plans for outpatient loop recorder noted      Thyroid nodule  Assessment & Plan  Noted on CTA H&N  · Outpatient thyroid U/S recommended     Essential hypertension  Assessment & Plan  Hypertensive urgency on admission  Blood pressure is now improving  Monitor blood pressures  Avoid hypotension    Coronary artery disease involving native coronary artery of native heart without angina pectoris  Assessment & Plan  CAD - s/p SHERIF lt circ 2019, SHERIF OM1 and distal RCA with robotic CABG- LIMA to LAD-diagonal 4/2020  · Continue aspirin statin Brilinta beta-blocker  · Cardiology input noted    Mixed hyperlipidemia  Assessment & Plan  · Continue atorvastatin    S/P CABG (coronary artery bypass graft)  Assessment & Plan  Continue aspirin statin beta-blocker            VTE Pharmacologic Prophylaxis: VTE Score: 7 High Risk (Score >/= 5) - Pharmacological DVT Prophylaxis Ordered: heparin  Sequential Compression Devices Ordered  Patient Centered Rounds: I performed bedside rounds with nursing staff today  Discussions with Specialists or Other Care Team Provider:  Vascular surgery    Education and Discussions with Family / Patient: Discussed with the patient, offered to call family reports he is keeping his family updated  Time Spent for Care: 30 minutes  More than 50% of total time spent on counseling and coordination of care as described above  Current Length of Stay: 1 day(s)  Current Patient Status: Inpatient   Certification Statement: The patient will continue to require additional inpatient hospital stay due to As outlined  Discharge Plan: Vascular surgery plans for carotid endarterectomy noted    Code Status: Level 1 - Full Code    Subjective:     Comfortably sitting up in chair  Reports feeling okay  History chart labs medications reviewed    Objective:     Vitals:   Temp (24hrs), Av 5 °F (36 4 °C), Min:97 2 °F (36 2 °C), Max:98 °F (36 7 °C)    Temp:  [97 2 °F (36 2 °C)-98 °F (36 7 °C)] 97 2 °F (36 2 °C)  HR:  [57-63] 57  BP: (173-186)/() 186/84  SpO2:  [95 %-98 %] 98 %  Body mass index is 27 14 kg/m²  Input and Output Summary (last 24 hours):      Intake/Output Summary (Last 24 hours) at 2022 1443  Last data filed at 2022 1700  Gross per 24 hour   Intake 220 ml   Output --   Net 220 ml       Physical Exam:   Physical Exam     Comfortably sitting up in chair  Neck supple  Lungs diminished breath sounds bilateral  No additional sounds  Heart sounds S1 and S2 noted  Abdomen soft nontender  Awake alert obeys simple commands  No pedal edema  No rash    Additional Data:     Labs:  Results from last 7 days   Lab Units 06/17/22  0613   WBC Thousand/uL 8 23   HEMOGLOBIN g/dL 15 9   HEMATOCRIT % 48 6   PLATELETS Thousands/uL 205   NEUTROS PCT % 75   LYMPHS PCT % 14   MONOS PCT % 9   EOS PCT % 2     Results from last 7 days   Lab Units 06/17/22  0613   SODIUM mmol/L 139   POTASSIUM mmol/L 2 9*   CHLORIDE mmol/L 105   CO2 mmol/L 30   BUN mg/dL 14   CREATININE mg/dL 0 98   ANION GAP mmol/L 4   CALCIUM mg/dL 8 6   ALBUMIN g/dL 3 3*   TOTAL BILIRUBIN mg/dL 2 63*   ALK PHOS U/L 64   ALT U/L 19   AST U/L 12   GLUCOSE RANDOM mg/dL 99     Results from last 7 days   Lab Units 06/15/22  2202   INR  0 96     Results from last 7 days   Lab Units 06/15/22  2132   POC GLUCOSE mg/dl 159*     Results from last 7 days   Lab Units 06/16/22  0638 06/15/22  2202   HEMOGLOBIN A1C % 6 0* 6 1*           Lines/Drains:  Invasive Devices  Report    Peripheral Intravenous Line  Duration           Peripheral IV 06/15/22 Left Antecubital 2 days                  Telemetry:  Telemetry Orders (From admission, onward)             48 Hour Telemetry Monitoring  Continuous x 48 hours        References:    Telemetry Guidelines   Question:  Reason for 48 Hour Telemetry  Answer:  Acute CVA (<24 hrs old, hemispheric strokes, selected brainstem strokes, cardiac arrhythmias)                 Telemetry Reviewed: Sinus rhythm  Indication for Continued Telemetry Use: Acute CVA             Imaging: Reviewed radiology reports from this admission including: MRI brain, ECHO and CTA head and neck    Recent Cultures (last 7 days):         Last 24 Hours Medication List:   Current Facility-Administered Medications   Medication Dose Route Frequency Provider Last Rate    aspirin  81 mg Oral Daily Jadon Clarke DO      atorvastatin  80 mg Oral QPM Jutsin Morse DO      budesonide-formoterol  2 puff Inhalation BID Roni Mane PA-C      carvedilol  6 25 mg Oral BID With Meals Lin Solo PA-C      chlorhexidine  15 mL Swish & Spit Once Jeremy Matos MD      heparin (porcine)  5,000 Units Subcutaneous Q8H [facility] Nessa Blend, DO      sodium chloride  75 mL/hr Intravenous Continuous Elba Fuentes PA-C 75 mL/hr (06/17/22 0054)    ticagrelor  90 mg Oral Q12H [facility] Nessa Blend, DO          Today, Patient Was Seen By: Jade Fuentes MD    **Please Note: This note may have been constructed using a voice recognition system  **

## 2022-06-17 NOTE — ASSESSMENT & PLAN NOTE
Hypertensive urgency on admission  Blood pressure is now improving  Monitor blood pressures  Avoid hypotension

## 2022-06-17 NOTE — ASSESSMENT & PLAN NOTE
MRI brain imaging reveals multifocal small infarcts left frontal and left occipital lobes  Imaging studies revealed severe left proximal internal carotid artery stenosis  Continue aspirin Brilinta statin  Neurology input noted  Vascular surgery plans for carotid artery endarterectomy noted  Physical therapy

## 2022-06-17 NOTE — ASSESSMENT & PLAN NOTE
Cardiology notes reviewed -  History of PAF post MI and spontaneously converted to sinus rhythm  Presently not on anticoagulation  Cardiology input noted plans for outpatient loop recorder noted

## 2022-06-17 NOTE — PLAN OF CARE
Problem: Nutrition/Hydration-ADULT  Goal: Nutrient/Hydration intake appropriate for improving, restoring or maintaining nutritional needs  Description: Monitor and assess patient's nutrition/hydration status for malnutrition  Collaborate with interdisciplinary team and initiate plan and interventions as ordered  Monitor patient's weight and dietary intake as ordered or per policy  Utilize nutrition screening tool and intervene as necessary  Determine patient's food preferences and provide high-protein, high-caloric foods as appropriate       INTERVENTIONS:  - Monitor oral intake, urinary output, labs, and treatment plans  - Assess nutrition and hydration status and recommend course of action  - Evaluate amount of meals eaten  - Assist patient with eating if necessary   - Allow adequate time for meals  - Recommend/ encourage appropriate diets, oral nutritional supplements, and vitamin/mineral supplements  - Order, calculate, and assess calorie counts as needed  - Recommend, monitor, and adjust tube feedings and TPN/PPN based on assessed needs  - Assess need for intravenous fluids  - Provide specific nutrition/hydration education as appropriate  - Include patient/family/caregiver in decisions related to nutrition  Outcome: Progressing     Problem: PAIN - ADULT  Goal: Verbalizes/displays adequate comfort level or baseline comfort level  Description: Interventions:  - Encourage patient to monitor pain and request assistance  - Assess pain using appropriate pain scale  - Administer analgesics based on type and severity of pain and evaluate response  - Implement non-pharmacological measures as appropriate and evaluate response  - Consider cultural and social influences on pain and pain management  - Notify physician/advanced practitioner if interventions unsuccessful or patient reports new pain  Outcome: Progressing     Problem: INFECTION - ADULT  Goal: Absence or prevention of progression during hospitalization  Description: INTERVENTIONS:  - Assess and monitor for signs and symptoms of infection  - Monitor lab/diagnostic results  - Monitor all insertion sites, i e  indwelling lines, tubes, and drains  - Monitor endotracheal if appropriate and nasal secretions for changes in amount and color  - [place] appropriate cooling/warming therapies per order  - Administer medications as ordered  - Instruct and encourage patient and family to use good hand hygiene technique  - Identify and instruct in appropriate isolation precautions for identified infection/condition  Outcome: Progressing  Goal: Absence of fever/infection during neutropenic period  Description: INTERVENTIONS:  - Monitor WBC    Outcome: Progressing     Problem: SAFETY ADULT  Goal: Patient will remain free of falls  Description: INTERVENTIONS:  - Educate patient/family on patient safety including physical limitations  - Instruct patient to call for assistance with activity   - Consult OT/PT to assist with strengthening/mobility   - Keep Call bell within reach  - Keep bed low and locked with side rails adjusted as appropriate  - Keep care items and personal belongings within reach  - Initiate and maintain comfort rounds  - Make Fall Risk Sign visible to staff  - Apply yellow socks and bracelet for high fall risk patients  - Consider moving patient to room near nurses station  Outcome: Progressing  Goal: Maintain or return to baseline ADL function  Description: INTERVENTIONS:  -  Assess patient's ability to carry out ADLs; assess patient's baseline for ADL function and identify physical deficits which impact ability to perform ADLs (bathing, care of mouth/teeth, toileting, grooming, dressing, etc )  - Assess/evaluate cause of self-care deficits   - Assess range of motion  - Assess patient's mobility; develop plan if impaired  - Assess patient's need for assistive devices and provide as appropriate  - Encourage maximum independence but intervene and supervise when necessary  - Involve family in performance of ADLs  - Assess for home care needs following discharge   - Consider OT consult to assist with ADL evaluation and planning for discharge  - Provide patient education as appropriate  Outcome: Progressing  Goal: Maintains/Returns to pre admission functional level  Description: INTERVENTIONS:  - Perform BMAT or MOVE assessment daily    - Set and communicate daily mobility goal to care team and patient/family/caregiver  - Collaborate with rehabilitation services on mobility goals if consulted  - Out of bed for toileting  - Record patient progress and toleration of activity level   Outcome: Progressing     Problem: DISCHARGE PLANNING  Goal: Discharge to home or other facility with appropriate resources  Description: INTERVENTIONS:  - Identify barriers to discharge w/patient and caregiver  - Arrange for needed discharge resources and transportation as appropriate  - Identify discharge learning needs (meds, wound care, etc )  - Arrange for interpretive services to assist at discharge as needed  - Refer to Case Management Department for coordinating discharge planning if the patient needs post-hospital services based on physician/advanced practitioner order or complex needs related to functional status, cognitive ability, or social support system  Outcome: Progressing     Problem: Knowledge Deficit  Goal: Patient/family/caregiver demonstrates understanding of disease process, treatment plan, medications, and discharge instructions  Description: Complete learning assessment and assess knowledge base    Interventions:  - Provide teaching at level of understanding  - Provide teaching via preferred learning methods  Outcome: Progressing

## 2022-06-17 NOTE — UTILIZATION REVIEW
Initial Clinical Review    Admission: Date/Time/Statement:   Admission Orders (From admission, onward)     Ordered        06/16/22 0556  Inpatient Admission  Once                      Orders Placed This Encounter   Procedures    Inpatient Admission     Standing Status:   Standing     Number of Occurrences:   1     Order Specific Question:   Level of Care     Answer:   Level 2 Stepdown / HOT [14]     Order Specific Question:   Estimated length of stay     Answer:   More than 2 Midnights     Order Specific Question:   Certification     Answer:   I certify that inpatient services are medically necessary for this patient for a duration of greater than two midnights  See H&P and MD Progress Notes for additional information about the patient's course of treatment  6/15/2022 - 6/16/2022 (7 hours)  [facility] Emergency Department  Stenosis of left carotid artery, TIA, HTN urgency  Transfer to [facility] for higher level of care  Initially treated with iv Labetalol           Initial Presentation: 79 y o  male received from [address] [place] [address] ED for inpatient step down admission to further evaluate and treat carotid artery stenosis  PMHX: HTN, MI, TIA  Imaging shows severe stenosis of left proximal ICA  Plan includes neurovascular checks, MRI brain, cardiology clearance for surgery, dual antiplatelet therapy and statin, L CEA  Date: 6-17-22  Day 2: inpatient   Iv kcl 40 meq and k-dur meq po for potassium 2 9  Continue  IV fluids  Collect and follow up hepatic function panel         Date: 06/17/22   Procedure: ENDARTERECTOMY ARTERY CAROTID (Left Neck)   Anesthesia type: General   Diagnosis: Stenosis of left carotid artery [I65 22]   Pre-op diagnosis: Stenosis of left carotid artery [I65 22]           ED Triage Vitals   06/16/22 1114 06/16/22 0834 -- 06/16/22 0556 06/16/22 0834   97 5 °F (36 4 °C) 64  (!) 205/99 97 %                No Pain          06/16/22 101 kg (223 lb)     Additional Vital Signs:     Date/Time Temp Pulse BP MAP (mmHg) SpO2 O2 Device   06/17/22 07:29:35 97 2 °F (36 2 °C) Abnormal  57 186/84 Abnormal  118 98 % --   06/16/22 2300 -- -- -- -- -- None (Room air)   06/16/22 22:09:28 98 °F (36 7 °C) 62 175/101 Abnormal  126 95 % --   06/16/22 18:14:12 -- 63 180/90 Abnormal  120 96 % --   06/16/22 14:55:48 97 4 °F (36 3 °C) Abnormal  58 173/80 Abnormal  111 95 % --   06/16/22 14:53:04 97 4 °F (36 3 °C) Abnormal  60 173/80 Abnormal  111 96 % --   06/16/22 1430 -- 59 -- -- 93 % --   06/16/22 1400 -- 62 -- -- 95 % --   06/16/22 13:01:19 -- 63 180/88 Abnormal  119 95 % --   06/16/22 1300 -- 66 180/88 Abnormal  119 96 % --   06/16/22 1200 -- 71 -- -- 95 % --   06/16/22 11:14:09 97 5 °F (36 4 °C) 69 165/85 112 95 % --   06/16/22 1100 -- 71 -- -- 94 % --   06/16/22 1000 -- 70 209/105 Abnormal  140 96 % --   06/16/22 09:57:46 -- 64 209/105 Abnormal  140 95 % --   06/16/22 08:34:37 -- 64 191/99 Abnormal  130 97 % None (Room air)   06/16/22 05:56:02 -- -- 205/99 Abnormal  134 -- --       Date and Time R Pupil Size (mm) L Pupil Size (mm) R Pupil Reaction L Pupil Reaction   06/17/22 0100 3 3 Brisk Brisk   06/16/22 2300 3 3 Brisk Brisk   06/16/22 1700 3 3 Brisk Brisk   06/16/22 1500 3 3 Brisk Brisk   06/16/22 1330 3 3 Brisk Brisk   06/16/22 1300 3 3 Brisk Brisk   06/16/22 1130 3 3 Brisk Brisk   06/16/22 0930 3 3 Brisk Brisk   06/16/22 0834 3 3 Brisk Brisk   06/16/22 0700 3 3 Brisk Brisk   06/16/22 0224 3 3 Brisk Brisk   06/16/22 0116 -- 3 Brisk Brisk   06/15/22 2307 3 3 Brisk Brisk     Date and Time Eye Opening Best Verbal Response Best Motor Response [place] Coma Scale Score   06/17/22 0100 4 5 6 15   06/16/22 2300 4 5 6 15   06/16/22 0224 4 5 6 15   06/16/22 0116 4 5 6 15   06/15/22 2307 4 5 6 15               Pertinent Labs/Diagnostic Test Results:     CTA NECK AND BRAIN WITH AND WITHOUT CONTRAST   6-16-22   INDICATION: difficulty with speech and right arm and leg numbness one hour ago, symptoms now resolved  Age indeterminate infarcts left caudate and left thalamus  Mild chronic microangiopathic changes  Suspect acute left maxillary sinusitis  Correlate clinically      No evidence of large vessel occlusion within the major vasculature of the [place]  Moderate stenosis within distal M2/perisylvian branch of the left MCA      Significant atherosclerotic disease left proximal cervical internal carotid artery resulting in severe stenosis/near total occlusion just distal to the ICA origin (greater than 90% by NASCET criteria)  Interventional/surgical consult advised      Incidental thyroid nodules for which nonemergent follow-up thyroid ultrasound is recommended              MRI brain wo contrast   Final (06/17 0741)      Small multifocal acute infarcts in left frontal and left occipital lobes  Chronic lacunar infarcts in left caudate head and left thalamus with mild chronic microangiopathy  Polypoid lesions in right nasal cavity measuring up to 3 4 cm, likely nasal polyps  Consider evaluation by ENT              Results from last 7 days   Lab Units 06/17/22  0613 06/16/22  0638 06/15/22  2202   WBC Thousand/uL 8 23  --  7 03   HEMOGLOBIN g/dL 15 9  --  14 8   HEMATOCRIT % 48 6  --  46 1   PLATELETS Thousands/uL 205 202 208   NEUTROS ABS Thousands/µL 6 13  --  4 72         Results from last 7 days   Lab Units 06/17/22  0613 06/15/22  2202   SODIUM mmol/L 139 140   POTASSIUM mmol/L 2 9* 3 3*   CHLORIDE mmol/L 105 103   CO2 mmol/L 30 30   ANION GAP mmol/L 4 7   BUN mg/dL 14 21   CREATININE mg/dL 0 98 1 44*   EGFR ml/min/1 73sq m 79 49   CALCIUM mg/dL 8 6 8 8   MAGNESIUM mg/dL 2 5  --      Results from last 7 days   Lab Units 06/17/22  0613 06/15/22  2202   AST U/L 12 18   ALT U/L 19 27   ALK PHOS U/L 64 81   TOTAL PROTEIN g/dL 6 4 6 9   ALBUMIN g/dL 3 3* 3 8   TOTAL BILIRUBIN mg/dL 2 63* 1 53*     Results from last 7 days   Lab Units 06/15/22  2132   POC GLUCOSE mg/dl 159*     Results from last 7 days   Lab Units 06/17/22  0613 06/15/22  2202   GLUCOSE RANDOM mg/dL 99 162*         Results from last 7 days   Lab Units 06/16/22  0638 06/15/22  2202   HEMOGLOBIN A1C % 6 0* 6 1*   EAG mg/dl 126 128       Results from last 7 days   Lab Units 06/16/22  0229   HS TNI 0HR ng/L 6         Results from last 7 days   Lab Units 06/15/22  2202   PROTIME seconds 12 3   INR  0 96   PTT seconds 33     Results from last 7 days   Lab Units 06/16/22  0853   TSH 3RD GENERATON uIU/mL 2 930       Results from last 7 days   Lab Units 06/15/22  2202   NT-PRO BNP pg/mL 166*       ED Treatment:   Medication Administration - No Administrations Displayed (No Start Event Found)     None        Past Medical History:   Diagnosis Date    Asthma     Atrial fibrillation (HCC)     Coronary artery disease     HTN (hypertension)     Hyperlipidemia     Myocardial infarction ([facility] ) 02/2019    Stroke ([facility] )     TIA (transient ischemic attack)      Present on Admission:   Mixed hyperlipidemia   Coronary artery disease involving native coronary artery of native heart without angina pectoris   Essential hypertension      Admitting Diagnosis:     Carotid stenosis [I65 29]  TIA (transient ischemic attack) [G45 9]    Age/Sex: 79 y o  male    Scheduled Medications:  aspirin, 81 mg, Oral, Daily  atorvastatin, 80 mg, Oral, QPM  budesonide-formoterol, 2 puff, Inhalation, BID  carvedilol, 6 25 mg, Oral, BID With Meals  chlorhexidine, 15 mL, Swish & Spit, Once  heparin (porcine), 5,000 Units, Subcutaneous, Q8H [facility]  potassium chloride, 40 mEq, Oral, Once  potassium chloride, 20 mEq, Intravenous, Q2H  ticagrelor, 90 mg, Oral, Q12H [facility]      Continuous IV Infusions:  sodium chloride, 75 mL/hr, Intravenous, Continuous      PRN Meds:       IP CONSULT TO PHYSICAL MEDICINE REHAB  IP CONSULT TO NEUROLOGY  IP CONSULT TO CASE MANAGEMENT  IP CONSULT TO NUTRITION SERVICES  IP CONSULT TO VASCULAR SURGERY  IP CONSULT TO CARDIOLOGY    Network Utilization Review Department  ATTENTION: Please call with any questions or concerns to [phone] and carefully listen to the prompts so that you are directed to the right person  All voicemails are confidential   Rajni Doing all requests for admission clinical reviews, approved or denied determinations and any other requests to dedicated fax number below belonging to the campus where the patient is receiving treatment   List of dedicated fax numbers for the Facilities:  [address] DENIALS (Administrative/Medical Necessity) [phone]   [address] (Maternity/NICU/Pediatrics) [phone]   [address] [place] [address]  [address] Medical [place] [address] [id number] [place] [address] Carmelina Cord[address] [facility] [place] [address] [phone]

## 2022-06-17 NOTE — ASSESSMENT & PLAN NOTE
CTA head and neck severe stenosis left proximal ICA  Continue aspirin statin Brilinta  Vascular plans for carotid endarterectomy noted

## 2022-06-17 NOTE — ASSESSMENT & PLAN NOTE
Assessment:  67M  with Afib (not on anticoagulation), HTN, HLD, history of MI (s/p DESx2 followed by CABG x1 4/2020), history of TIA presenting to [facility] ED with right arm paresthesias and slurred speech, now s/p L CEA on 6/18      CTA head and neck reviewed demonstrating severe stenosis with essentially string sign of left proximal ICA  It appears that there some flow through this tight lesion,  Carotid duplex: There is 70-99% stenosis noted in the internal carotid artery  Plaque is heterogenous and irregular  MRI brain: sm multifocal acute infarct in L frontal & L occipital lobes  Chr lacunar infarcts in L caudate head & L thalamus w/ mild chr microangiopathy    R nasal cavity polypoid lesions  TTE: LVEF 62%      Plan:  Continue neurovascular checks  Appreciate cards recommendations for BP control; now off cardene gtt  Dual antiplatelet therapy/statin  Encourage OOB and ambulation  DC jasmyn  Possible dc later today vs tomorrow if BP remains controlled

## 2022-06-17 NOTE — ASSESSMENT & PLAN NOTE
Suspect some element of CKD, no prior labs in our system     · Monitor kidney function closely  · Avoid hypotension  · Monitor postvoid residuals

## 2022-06-17 NOTE — PROGRESS NOTES
Vascular Surgery   Progress Note    K+ 2 9  --KCL 40mEq IV  --KDur 40mg mEq po      Elev'd TB  ?   Etiology  --Liver fxn panel in am        Susana Shanks PA-C  6/17/2022

## 2022-06-17 NOTE — PROGRESS NOTES
[address] [facility]  Progress Note - Lisa Schooling 1955, 79 y o  male MRN: [id number]  Unit/Bed#: [facility] 717-01 Encounter: [id number]  Primary Care Provider: No primary care provider on file  Date and time admitted to hospital: 6/16/2022  5:43 AM    Carotid artery stenosis  Assessment & Plan  Assessment:  67M  with Afib (not on anticoagulation), HTN, HLD, history of MI (s/p DESx2 followed by CABG x1 4/2020), history of TIA presenting to [facility] ED with right arm paresthesias and slurred speech  Plan for OR today for L CEA      CTA head and neck reviewed demonstrating severe stenosis with essentially string sign of left proximal ICA  It appears that there some flow through this tight lesion,  Carotid duplex: There is 70-99% stenosis noted in the internal carotid artery  Plaque is heterogenous and irregular  TTE: LVEF 62%      Plan:  Continue neurovascular checks  Neurology consultation-F/u MRI brain read  Cards cleared 6/16 - moderate risk  Dual antiplatelet therapy/statin  Plan for OR today for L CEA              Subjective/Objective       Subjective: Feeling okay  No residual right side weakness    Objective:     Blood pressure (!) 175/101, pulse 62, temperature 98 °F (36 7 °C), height 6' 4" (1 93 m), weight 101 kg (223 lb), SpO2 95 %  ,Body mass index is 27 14 kg/m²  Intake/Output Summary (Last 24 hours) at 6/17/2022 0716  Last data filed at 6/16/2022 1700  Gross per 24 hour   Intake 220 ml   Output --   Net 220 ml       Invasive Devices  Report    Peripheral Intravenous Line  Duration           Peripheral IV 06/15/22 Right Antecubital 1 day                Physical Exam  Vitals reviewed  Constitutional:       General: He is not in acute distress  Appearance: He is not ill-appearing or toxic-appearing  HENT:      Head: Normocephalic and atraumatic        Nose: Nose normal       Mouth/Throat:      Mouth: Mucous membranes are moist    Eyes:      Extraocular Movements: Extraocular movements intact  Pulmonary:      Effort: Pulmonary effort is normal  No respiratory distress  Neurological:      Mental Status: He is alert  Mental status is at baseline             Lab, Imaging and other studies:CBC: No results found for: WBC, HGB, HCT, MCV, PLT, ADJUSTEDWBC, MCH, MCHC, RDW, MPV, NRBC, CMP: No results found for: SODIUM, K, CL, CO2, ANIONGAP, BUN, CREATININE, GLUCOSE, CALCIUM, AST, ALT, ALKPHOS, PROT, BILITOT, EGFR, Coagulation: No results found for: PT, INR, APTT  VTE Pharmacologic Prophylaxis: Heparin  VTE Mechanical Prophylaxis: sequential compression device

## 2022-06-17 NOTE — ASSESSMENT & PLAN NOTE
Assessment:  67M  with Afib (not on anticoagulation), HTN, HLD, history of MI (s/p DESx2 followed by CABG x1 4/2020), history of TIA presenting to [facility] ED with right arm paresthesias and slurred speech      CTA head and neck reviewed demonstrating severe stenosis with essentially string sign of left proximal ICA  It appears that there some flow through this tight lesion,  Carotid duplex: There is 70-99% stenosis noted in the internal carotid artery  Plaque is heterogenous and irregular  TTE: LVEF 62%      Plan:  Continue neurovascular checks  Neurology consultation-F/u MRI brain read  Cards cleared 6/16 - moderate risk  Consider dual antiplatelet therapy, unclear if patient was actually on Plavix looks like he may have been on Brilinta    Plan for OR today for L CEA

## 2022-06-18 ENCOUNTER — APPOINTMENT (INPATIENT)
Dept: NON INVASIVE DIAGNOSTICS | Facility: HOSPITAL | Age: 67
DRG: 039 | End: 2022-06-18
Payer: COMMERCIAL

## 2022-06-18 ENCOUNTER — ANESTHESIA (INPATIENT)
Dept: PERIOP | Facility: HOSPITAL | Age: 67
DRG: 039 | End: 2022-06-18
Payer: COMMERCIAL

## 2022-06-18 ENCOUNTER — ANESTHESIA EVENT (INPATIENT)
Dept: PERIOP | Facility: HOSPITAL | Age: 67
DRG: 039 | End: 2022-06-18
Payer: COMMERCIAL

## 2022-06-18 LAB
ALBUMIN SERPL BCP-MCNC: 3.3 G/DL (ref 3.5–5)
ALP SERPL-CCNC: 73 U/L (ref 46–116)
ALT SERPL W P-5'-P-CCNC: 19 U/L (ref 12–78)
ANION GAP SERPL CALCULATED.3IONS-SCNC: 6 MMOL/L (ref 4–13)
AST SERPL W P-5'-P-CCNC: 12 U/L (ref 5–45)
BILIRUB DIRECT SERPL-MCNC: 0.38 MG/DL (ref 0–0.2)
BILIRUB SERPL-MCNC: 1.71 MG/DL (ref 0.2–1)
BUN SERPL-MCNC: 15 MG/DL (ref 5–25)
CALCIUM SERPL-MCNC: 8.8 MG/DL (ref 8.3–10.1)
CHLORIDE SERPL-SCNC: 107 MMOL/L (ref 100–108)
CO2 SERPL-SCNC: 26 MMOL/L (ref 21–32)
CREAT SERPL-MCNC: 1.1 MG/DL (ref 0.6–1.3)
GFR SERPL CREATININE-BSD FRML MDRD: 69 ML/MIN/1.73SQ M
GLUCOSE SERPL-MCNC: 125 MG/DL (ref 65–140)
GLUCOSE SERPL-MCNC: 155 MG/DL (ref 65–140)
GLUCOSE SERPL-MCNC: 202 MG/DL (ref 65–140)
KCT BLD-ACNC: 200 SEC (ref 89–137)
KCT BLD-ACNC: 212 SEC (ref 89–137)
KCT BLD-ACNC: 289 SEC (ref 89–137)
POTASSIUM SERPL-SCNC: 3.5 MMOL/L (ref 3.5–5.3)
POTASSIUM SERPL-SCNC: 3.9 MMOL/L (ref 3.5–5.3)
PROT SERPL-MCNC: 6.5 G/DL (ref 6.4–8.2)
SODIUM SERPL-SCNC: 139 MMOL/L (ref 136–145)
SPECIMEN SOURCE: ABNORMAL

## 2022-06-18 PROCEDURE — 82948 REAGENT STRIP/BLOOD GLUCOSE: CPT

## 2022-06-18 PROCEDURE — 99024 POSTOP FOLLOW-UP VISIT: CPT | Performed by: SURGERY

## 2022-06-18 PROCEDURE — 35301 RECHANNELING OF ARTERY: CPT | Performed by: SURGERY

## 2022-06-18 PROCEDURE — 03UL0KZ SUPPLEMENT LEFT INTERNAL CAROTID ARTERY WITH NONAUTOLOGOUS TISSUE SUBSTITUTE, OPEN APPROACH: ICD-10-PCS | Performed by: SURGERY

## 2022-06-18 PROCEDURE — 80048 BASIC METABOLIC PNL TOTAL CA: CPT | Performed by: INTERNAL MEDICINE

## 2022-06-18 PROCEDURE — NC001 PR NO CHARGE: Performed by: SURGERY

## 2022-06-18 PROCEDURE — 03CL0ZZ EXTIRPATION OF MATTER FROM LEFT INTERNAL CAROTID ARTERY, OPEN APPROACH: ICD-10-PCS | Performed by: SURGERY

## 2022-06-18 PROCEDURE — 85347 COAGULATION TIME ACTIVATED: CPT

## 2022-06-18 PROCEDURE — 93882 EXTRACRANIAL UNI/LTD STUDY: CPT

## 2022-06-18 PROCEDURE — 80076 HEPATIC FUNCTION PANEL: CPT | Performed by: PHYSICIAN ASSISTANT

## 2022-06-18 PROCEDURE — C1781 MESH (IMPLANTABLE): HCPCS | Performed by: SURGERY

## 2022-06-18 DEVICE — XENOSURE BIOLOGIC PATCH, 0.8CM X 8CM, EIFU
Type: IMPLANTABLE DEVICE | Site: CAROTID | Status: FUNCTIONAL
Brand: XENOSURE BIOLOGIC PATCH

## 2022-06-18 RX ORDER — CEFAZOLIN SODIUM 2 G/50ML
2000 SOLUTION INTRAVENOUS EVERY 8 HOURS
Status: COMPLETED | OUTPATIENT
Start: 2022-06-18 | End: 2022-06-18

## 2022-06-18 RX ORDER — HYDRALAZINE HYDROCHLORIDE 20 MG/ML
15 INJECTION INTRAMUSCULAR; INTRAVENOUS
Status: DISCONTINUED | OUTPATIENT
Start: 2022-06-18 | End: 2022-06-21 | Stop reason: HOSPADM

## 2022-06-18 RX ORDER — PROPOFOL 10 MG/ML
INJECTION, EMULSION INTRAVENOUS AS NEEDED
Status: DISCONTINUED | OUTPATIENT
Start: 2022-06-18 | End: 2022-06-18

## 2022-06-18 RX ORDER — ACETAMINOPHEN 325 MG/1
650 TABLET ORAL EVERY 4 HOURS PRN
Status: DISCONTINUED | OUTPATIENT
Start: 2022-06-18 | End: 2022-06-21 | Stop reason: HOSPADM

## 2022-06-18 RX ORDER — ROCURONIUM BROMIDE 10 MG/ML
INJECTION, SOLUTION INTRAVENOUS AS NEEDED
Status: DISCONTINUED | OUTPATIENT
Start: 2022-06-18 | End: 2022-06-18

## 2022-06-18 RX ORDER — EPHEDRINE SULFATE 50 MG/ML
INJECTION INTRAVENOUS AS NEEDED
Status: DISCONTINUED | OUTPATIENT
Start: 2022-06-18 | End: 2022-06-18

## 2022-06-18 RX ORDER — ONDANSETRON 2 MG/ML
4 INJECTION INTRAMUSCULAR; INTRAVENOUS ONCE AS NEEDED
Status: DISCONTINUED | OUTPATIENT
Start: 2022-06-18 | End: 2022-06-18 | Stop reason: HOSPADM

## 2022-06-18 RX ORDER — SODIUM CHLORIDE, SODIUM LACTATE, POTASSIUM CHLORIDE, CALCIUM CHLORIDE 600; 310; 30; 20 MG/100ML; MG/100ML; MG/100ML; MG/100ML
100 INJECTION, SOLUTION INTRAVENOUS CONTINUOUS
Status: DISPENSED | OUTPATIENT
Start: 2022-06-18 | End: 2022-06-18

## 2022-06-18 RX ORDER — ONDANSETRON 2 MG/ML
INJECTION INTRAMUSCULAR; INTRAVENOUS AS NEEDED
Status: DISCONTINUED | OUTPATIENT
Start: 2022-06-18 | End: 2022-06-18

## 2022-06-18 RX ORDER — DEXAMETHASONE SODIUM PHOSPHATE 10 MG/ML
INJECTION, SOLUTION INTRAMUSCULAR; INTRAVENOUS AS NEEDED
Status: DISCONTINUED | OUTPATIENT
Start: 2022-06-18 | End: 2022-06-18

## 2022-06-18 RX ORDER — PROTAMINE SULFATE 10 MG/ML
INJECTION, SOLUTION INTRAVENOUS AS NEEDED
Status: DISCONTINUED | OUTPATIENT
Start: 2022-06-18 | End: 2022-06-18

## 2022-06-18 RX ORDER — SODIUM CHLORIDE, SODIUM LACTATE, POTASSIUM CHLORIDE, CALCIUM CHLORIDE 600; 310; 30; 20 MG/100ML; MG/100ML; MG/100ML; MG/100ML
75 INJECTION, SOLUTION INTRAVENOUS CONTINUOUS
Status: DISCONTINUED | OUTPATIENT
Start: 2022-06-18 | End: 2022-06-19

## 2022-06-18 RX ORDER — HEPARIN SODIUM 5000 [USP'U]/ML
5000 INJECTION, SOLUTION INTRAVENOUS; SUBCUTANEOUS EVERY 8 HOURS SCHEDULED
Status: DISCONTINUED | OUTPATIENT
Start: 2022-06-18 | End: 2022-06-21 | Stop reason: HOSPADM

## 2022-06-18 RX ORDER — MAGNESIUM HYDROXIDE 1200 MG/15ML
LIQUID ORAL AS NEEDED
Status: DISCONTINUED | OUTPATIENT
Start: 2022-06-18 | End: 2022-06-18 | Stop reason: HOSPADM

## 2022-06-18 RX ORDER — LABETALOL HYDROCHLORIDE 5 MG/ML
5 INJECTION, SOLUTION INTRAVENOUS
Status: COMPLETED | OUTPATIENT
Start: 2022-06-18 | End: 2022-06-20

## 2022-06-18 RX ORDER — LIDOCAINE HYDROCHLORIDE 10 MG/ML
INJECTION, SOLUTION EPIDURAL; INFILTRATION; INTRACAUDAL; PERINEURAL AS NEEDED
Status: DISCONTINUED | OUTPATIENT
Start: 2022-06-18 | End: 2022-06-18

## 2022-06-18 RX ORDER — ONDANSETRON 2 MG/ML
4 INJECTION INTRAMUSCULAR; INTRAVENOUS EVERY 6 HOURS PRN
Status: DISCONTINUED | OUTPATIENT
Start: 2022-06-18 | End: 2022-06-21 | Stop reason: HOSPADM

## 2022-06-18 RX ORDER — FENTANYL CITRATE/PF 50 MCG/ML
25 SYRINGE (ML) INJECTION
Status: DISCONTINUED | OUTPATIENT
Start: 2022-06-18 | End: 2022-06-18 | Stop reason: HOSPADM

## 2022-06-18 RX ORDER — HYDRALAZINE HYDROCHLORIDE 20 MG/ML
10 INJECTION INTRAMUSCULAR; INTRAVENOUS EVERY 6 HOURS PRN
Status: DISCONTINUED | OUTPATIENT
Start: 2022-06-18 | End: 2022-06-21 | Stop reason: HOSPADM

## 2022-06-18 RX ORDER — LIDOCAINE HYDROCHLORIDE 40 MG/ML
SOLUTION TOPICAL AS NEEDED
Status: DISCONTINUED | OUTPATIENT
Start: 2022-06-18 | End: 2022-06-18

## 2022-06-18 RX ORDER — OXYCODONE HYDROCHLORIDE 5 MG/1
5 TABLET ORAL EVERY 4 HOURS PRN
Status: DISCONTINUED | OUTPATIENT
Start: 2022-06-18 | End: 2022-06-21 | Stop reason: HOSPADM

## 2022-06-18 RX ORDER — HEPARIN SODIUM 1000 [USP'U]/ML
INJECTION, SOLUTION INTRAVENOUS; SUBCUTANEOUS AS NEEDED
Status: DISCONTINUED | OUTPATIENT
Start: 2022-06-18 | End: 2022-06-18

## 2022-06-18 RX ORDER — PROPOFOL 10 MG/ML
INJECTION, EMULSION INTRAVENOUS CONTINUOUS PRN
Status: DISCONTINUED | OUTPATIENT
Start: 2022-06-18 | End: 2022-06-18

## 2022-06-18 RX ORDER — SODIUM CHLORIDE, SODIUM LACTATE, POTASSIUM CHLORIDE, CALCIUM CHLORIDE 600; 310; 30; 20 MG/100ML; MG/100ML; MG/100ML; MG/100ML
INJECTION, SOLUTION INTRAVENOUS CONTINUOUS PRN
Status: DISCONTINUED | OUTPATIENT
Start: 2022-06-18 | End: 2022-06-18

## 2022-06-18 RX ORDER — OXYCODONE HYDROCHLORIDE 10 MG/1
10 TABLET ORAL EVERY 4 HOURS PRN
Status: DISCONTINUED | OUTPATIENT
Start: 2022-06-18 | End: 2022-06-21 | Stop reason: HOSPADM

## 2022-06-18 RX ORDER — CEFAZOLIN SODIUM 1 G/3ML
INJECTION, POWDER, FOR SOLUTION INTRAMUSCULAR; INTRAVENOUS AS NEEDED
Status: DISCONTINUED | OUTPATIENT
Start: 2022-06-18 | End: 2022-06-18

## 2022-06-18 RX ORDER — LABETALOL HYDROCHLORIDE 5 MG/ML
INJECTION, SOLUTION INTRAVENOUS AS NEEDED
Status: DISCONTINUED | OUTPATIENT
Start: 2022-06-18 | End: 2022-06-18

## 2022-06-18 RX ORDER — FENTANYL CITRATE 50 UG/ML
INJECTION, SOLUTION INTRAMUSCULAR; INTRAVENOUS AS NEEDED
Status: DISCONTINUED | OUTPATIENT
Start: 2022-06-18 | End: 2022-06-18

## 2022-06-18 RX ORDER — ALBUTEROL SULFATE 2.5 MG/3ML
2.5 SOLUTION RESPIRATORY (INHALATION) ONCE AS NEEDED
Status: DISCONTINUED | OUTPATIENT
Start: 2022-06-18 | End: 2022-06-18 | Stop reason: HOSPADM

## 2022-06-18 RX ADMIN — REMIFENTANIL HYDROCHLORIDE 0.1 MCG/KG/MIN: 1 INJECTION, POWDER, LYOPHILIZED, FOR SOLUTION INTRAVENOUS at 08:22

## 2022-06-18 RX ADMIN — ROCURONIUM BROMIDE 50 MG: 10 INJECTION INTRAVENOUS at 08:06

## 2022-06-18 RX ADMIN — SODIUM CHLORIDE, POTASSIUM CHLORIDE, SODIUM LACTATE AND CALCIUM CHLORIDE: 600; 310; 30; 20 INJECTION, SOLUTION INTRAVENOUS at 08:36

## 2022-06-18 RX ADMIN — TICAGRELOR 90 MG: 90 TABLET ORAL at 21:06

## 2022-06-18 RX ADMIN — SODIUM CHLORIDE, SODIUM LACTATE, POTASSIUM CHLORIDE, AND CALCIUM CHLORIDE 100 ML/HR: .6; .31; .03; .02 INJECTION, SOLUTION INTRAVENOUS at 13:17

## 2022-06-18 RX ADMIN — FENTANYL CITRATE 50 MCG: 50 INJECTION INTRAMUSCULAR; INTRAVENOUS at 08:04

## 2022-06-18 RX ADMIN — SODIUM CHLORIDE: 0.9 INJECTION, SOLUTION INTRAVENOUS at 08:36

## 2022-06-18 RX ADMIN — Medication 5 MG: at 11:43

## 2022-06-18 RX ADMIN — CHLORHEXIDINE GLUCONATE 15 ML: 1.2 SOLUTION ORAL at 07:35

## 2022-06-18 RX ADMIN — SUGAMMADEX 200 MG: 100 INJECTION, SOLUTION INTRAVENOUS at 11:29

## 2022-06-18 RX ADMIN — EPHEDRINE SULFATE 5 MG: 50 INJECTION, SOLUTION INTRAVENOUS at 11:05

## 2022-06-18 RX ADMIN — NICARDIPINE HYDROCHLORIDE 12.5 MG/HR: 2.5 INJECTION, SOLUTION INTRAVENOUS at 16:14

## 2022-06-18 RX ADMIN — EPHEDRINE SULFATE 5 MG: 50 INJECTION, SOLUTION INTRAVENOUS at 08:36

## 2022-06-18 RX ADMIN — CEFAZOLIN SODIUM 2000 MG: 2 SOLUTION INTRAVENOUS at 23:07

## 2022-06-18 RX ADMIN — NICARDIPINE HYDROCHLORIDE 15 MG/HR: 2.5 INJECTION, SOLUTION INTRAVENOUS at 13:46

## 2022-06-18 RX ADMIN — PROTAMINE SULFATE 40 MG: 10 INJECTION, SOLUTION INTRAVENOUS at 10:56

## 2022-06-18 RX ADMIN — LIDOCAINE HYDROCHLORIDE 100 MG: 10 INJECTION, SOLUTION EPIDURAL; INFILTRATION; INTRACAUDAL; PERINEURAL at 08:04

## 2022-06-18 RX ADMIN — NICARDIPINE HYDROCHLORIDE 5 MG/HR: 25 INJECTION, SOLUTION INTRAVENOUS at 08:46

## 2022-06-18 RX ADMIN — DEXAMETHASONE SODIUM PHOSPHATE 4 MG: 10 INJECTION, SOLUTION INTRAMUSCULAR; INTRAVENOUS at 09:28

## 2022-06-18 RX ADMIN — ONDANSETRON 4 MG: 2 INJECTION INTRAMUSCULAR; INTRAVENOUS at 10:51

## 2022-06-18 RX ADMIN — NICARDIPINE HYDROCHLORIDE 12.5 MG/HR: 2.5 INJECTION, SOLUTION INTRAVENOUS at 18:11

## 2022-06-18 RX ADMIN — PHENYLEPHRINE HYDROCHLORIDE 30 MCG/MIN: 10 INJECTION INTRAVENOUS at 08:53

## 2022-06-18 RX ADMIN — CEFAZOLIN SODIUM 2000 MG: 2 SOLUTION INTRAVENOUS at 17:50

## 2022-06-18 RX ADMIN — EPHEDRINE SULFATE 5 MG: 50 INJECTION, SOLUTION INTRAVENOUS at 11:02

## 2022-06-18 RX ADMIN — CEFAZOLIN SODIUM 2000 MG: 1 INJECTION, POWDER, FOR SOLUTION INTRAMUSCULAR; INTRAVENOUS at 08:40

## 2022-06-18 RX ADMIN — PROPOFOL 150 MCG/KG/MIN: 10 INJECTION, EMULSION INTRAVENOUS at 08:22

## 2022-06-18 RX ADMIN — SODIUM CHLORIDE, SODIUM LACTATE, POTASSIUM CHLORIDE, AND CALCIUM CHLORIDE 75 ML/HR: .6; .31; .03; .02 INJECTION, SOLUTION INTRAVENOUS at 14:16

## 2022-06-18 RX ADMIN — NICARDIPINE HYDROCHLORIDE 15 MG/HR: 2.5 INJECTION, SOLUTION INTRAVENOUS at 14:14

## 2022-06-18 RX ADMIN — HEPARIN SODIUM 1000 UNITS: 1000 INJECTION INTRAVENOUS; SUBCUTANEOUS at 10:19

## 2022-06-18 RX ADMIN — PROPOFOL 200 MG: 10 INJECTION, EMULSION INTRAVENOUS at 08:04

## 2022-06-18 RX ADMIN — NICARDIPINE HYDROCHLORIDE 12.5 MG/HR: 2.5 INJECTION, SOLUTION INTRAVENOUS at 20:00

## 2022-06-18 RX ADMIN — NICARDIPINE HYDROCHLORIDE 7.5 MG/HR: 2.5 INJECTION, SOLUTION INTRAVENOUS at 23:17

## 2022-06-18 RX ADMIN — HEPARIN SODIUM 9000 UNITS: 1000 INJECTION INTRAVENOUS; SUBCUTANEOUS at 09:05

## 2022-06-18 RX ADMIN — CARVEDILOL 6.25 MG: 6.25 TABLET, FILM COATED ORAL at 17:47

## 2022-06-18 RX ADMIN — Medication 10 MG: at 11:37

## 2022-06-18 NOTE — ANESTHESIA POSTPROCEDURE EVALUATION
Post-Op Assessment Note    CV Status:  Stable    Pain management: adequate     Mental Status:  Alert and awake   Hydration Status:  Euvolemic   PONV Controlled:  Controlled   Airway Patency:  Patent      Post Op Vitals Reviewed: Yes            No complications documented      BP   150/61   Temp   97 1   Pulse  66   Resp 16   SpO2 98

## 2022-06-18 NOTE — PLAN OF CARE
Problem: Nutrition/Hydration-ADULT  Goal: Nutrient/Hydration intake appropriate for improving, restoring or maintaining nutritional needs  Description: Monitor and assess patient's nutrition/hydration status for malnutrition  Collaborate with interdisciplinary team and initiate plan and interventions as ordered  Monitor patient's weight and dietary intake as ordered or per policy  Utilize nutrition screening tool and intervene as necessary  Determine patient's food preferences and provide high-protein, high-caloric foods as appropriate       INTERVENTIONS:  - Monitor oral intake, urinary output, labs, and treatment plans  - Assess nutrition and hydration status and recommend course of action  - Evaluate amount of meals eaten  - Assist patient with eating if necessary   - Allow adequate time for meals  - Recommend/ encourage appropriate diets, oral nutritional supplements, and vitamin/mineral supplements  - Order, calculate, and assess calorie counts as needed  - Recommend, monitor, and adjust tube feedings and TPN/PPN based on assessed needs  - Assess need for intravenous fluids  - Provide specific nutrition/hydration education as appropriate  - Include patient/family/caregiver in decisions related to nutrition  Outcome: Progressing     Problem: PAIN - ADULT  Goal: Verbalizes/displays adequate comfort level or baseline comfort level  Description: Interventions:  - Encourage patient to monitor pain and request assistance  - Assess pain using appropriate pain scale  - Administer analgesics based on type and severity of pain and evaluate response  - Implement non-pharmacological measures as appropriate and evaluate response  - Consider cultural and social influences on pain and pain management  - Notify physician/advanced practitioner if interventions unsuccessful or patient reports new pain  Outcome: Progressing     Problem: INFECTION - ADULT  Goal: Absence or prevention of progression during hospitalization  Description: INTERVENTIONS:  - Assess and monitor for signs and symptoms of infection  - Monitor lab/diagnostic results  - Monitor all insertion sites, i e  indwelling lines, tubes, and drains  - Monitor endotracheal if appropriate and nasal secretions for changes in amount and color  - [place] appropriate cooling/warming therapies per order  - Administer medications as ordered  - Instruct and encourage patient and family to use good hand hygiene technique  - Identify and instruct in appropriate isolation precautions for identified infection/condition  Outcome: Progressing  Goal: Absence of fever/infection during neutropenic period  Description: INTERVENTIONS:  - Monitor WBC    Outcome: Progressing     Problem: SAFETY ADULT  Goal: Patient will remain free of falls  Description: INTERVENTIONS:  - Educate patient/family on patient safety including physical limitations  - Instruct patient to call for assistance with activity   - Consult OT/PT to assist with strengthening/mobility   - Keep Call bell within reach  - Keep bed low and locked with side rails adjusted as appropriate  - Keep care items and personal belongings within reach  - Initiate and maintain comfort rounds  - Make Fall Risk Sign visible to staff  - Apply yellow socks and bracelet for high fall risk patients  - Consider moving patient to room near nurses station  Outcome: Progressing  Goal: Maintain or return to baseline ADL function  Description: INTERVENTIONS:  -  Assess patient's ability to carry out ADLs; assess patient's baseline for ADL function and identify physical deficits which impact ability to perform ADLs (bathing, care of mouth/teeth, toileting, grooming, dressing, etc )  - Assess/evaluate cause of self-care deficits   - Assess range of motion  - Assess patient's mobility; develop plan if impaired  - Assess patient's need for assistive devices and provide as appropriate  - Encourage maximum independence but intervene and supervise when necessary  - Involve family in performance of ADLs  - Assess for home care needs following discharge   - Consider OT consult to assist with ADL evaluation and planning for discharge  - Provide patient education as appropriate  Outcome: Progressing  Goal: Maintains/Returns to pre admission functional level  Description: INTERVENTIONS:  - Perform BMAT or MOVE assessment daily    - Set and communicate daily mobility goal to care team and patient/family/caregiver  - Collaborate with rehabilitation services on mobility goals if consulted  - Out of bed for toileting  - Record patient progress and toleration of activity level   Outcome: Progressing     Problem: DISCHARGE PLANNING  Goal: Discharge to home or other facility with appropriate resources  Description: INTERVENTIONS:  - Identify barriers to discharge w/patient and caregiver  - Arrange for needed discharge resources and transportation as appropriate  - Identify discharge learning needs (meds, wound care, etc )  - Arrange for interpretive services to assist at discharge as needed  - Refer to Case Management Department for coordinating discharge planning if the patient needs post-hospital services based on physician/advanced practitioner order or complex needs related to functional status, cognitive ability, or social support system  Outcome: Progressing     Problem: Knowledge Deficit  Goal: Patient/family/caregiver demonstrates understanding of disease process, treatment plan, medications, and discharge instructions  Description: Complete learning assessment and assess knowledge base    Interventions:  - Provide teaching at level of understanding  - Provide teaching via preferred learning methods  Outcome: Progressing

## 2022-06-18 NOTE — QUICK NOTE
Patient to OR for carotid endarterectomy with vascular surgery  Post OR patient upgraded to level 1 step-down unit    Communicated with vascular surgery to reach out to Critical care service for further cares while patient is on level 1 step-down unit    [place]

## 2022-06-18 NOTE — PROGRESS NOTES
[address] [facility]  Progress Note - Aurelia Peed 1955, 79 y o  male MRN: [id number]  Unit/Bed#: [facility] 709-01 Encounter: [id number]  Primary Care Provider: No primary care provider on file  Date and time admitted to hospital: 6/16/2022  5:43 AM    Carotid artery stenosis  Assessment & Plan  Assessment:  67M  with Afib (not on anticoagulation), HTN, HLD, history of MI (s/p DESx2 followed by CABG x1 4/2020), history of TIA presenting to [facility] ED with right arm paresthesias and slurred speech  Plan for OR today for L CEA      CTA head and neck reviewed demonstrating severe stenosis with essentially string sign of left proximal ICA  It appears that there some flow through this tight lesion,  Carotid duplex: There is 70-99% stenosis noted in the internal carotid artery  Plaque is heterogenous and irregular  MRI brain: sm multifocal acute infarct in L frontal & L occipital lobes  Chr lacunar infarcts in L caudate head & L thalamus w/ mild chr microangiopathy  R nasal cavity polypoid lesions  TTE: LVEF 62%      Plan:  Continue neurovascular checks  Neurology consultation  Cards cleared 6/16 - moderate risk  Dual antiplatelet therapy/statin  Plan for OR today for L CEA-postponed yesterday due to OR availability              Subjective/Objective       Subjective: no acute events overnight  Objective:     Blood pressure 158/90, pulse 61, temperature 97 8 °F (36 6 °C), height 6' 4" (1 93 m), weight 101 kg (223 lb), SpO2 96 %  ,Body mass index is 27 14 kg/m²  No intake or output data in the 24 hours ending 06/18/22 0616    Invasive Devices  Report    Peripheral Intravenous Line  Duration           Peripheral IV 06/17/22 Left;Ventral (anterior) Forearm <1 day                Physical Exam  Vitals reviewed  Constitutional:       General: He is not in acute distress  Appearance: He is not ill-appearing, toxic-appearing or diaphoretic     HENT:      Head: Normocephalic and atraumatic  Eyes:      Extraocular Movements: Extraocular movements intact  Cardiovascular:      Rate and Rhythm: Normal rate  Pulmonary:      Effort: Pulmonary effort is normal       Breath sounds: No stridor  Musculoskeletal:      Comments: Moving all ext   Skin:     General: Skin is warm and dry  Neurological:      General: No focal deficit present  Mental Status: He is alert and oriented to person, place, and time  Mental status is at baseline     Psychiatric:         Mood and Affect: Mood normal          Behavior: Behavior normal            Lab, Imaging and other studies:CBC: No results found for: WBC, HGB, HCT, MCV, PLT, ADJUSTEDWBC, MCH, MCHC, RDW, MPV, NRBC, CMP:   Lab Results   Component Value Date    K 3 9 06/17/2022   , Coagulation: No results found for: PT, INR, APTT  VTE Pharmacologic Prophylaxis: Heparin  VTE Mechanical Prophylaxis: sequential compression device

## 2022-06-18 NOTE — ANESTHESIA PREPROCEDURE EVALUATION
Procedure:  ENDARTERECTOMY ARTERY CAROTID (Left Neck)    Relevant Problems   CARDIO   (+) Atrial fibrillation (HCC)   (+) Chest pain   (+) Coronary artery disease involving native coronary artery of native heart without angina pectoris   (+) Essential hypertension   (+) Mixed hyperlipidemia   (+) S/P CABG (coronary artery bypass graft)      NEURO/PSYCH   (+) Acute CVA (cerebrovascular accident) ([facility] [address] )   (+) Stroke ([facility] [address] )      PULMONARY   (+) Uncomplicated asthma        Physical Exam    Airway    Mallampati score: II  TM Distance: >3 FB  Neck ROM: full     Dental       Cardiovascular      Pulmonary      Other Findings        Anesthesia Plan  ASA Score- 3     Anesthesia Type- general with ASA Monitors  Additional Monitors: arterial line  Airway Plan: ETT  Comment: Ga w/ ETT  Plan for remifentanil infusion for akinesis  Hx TIA w/ resolved symptoms          Plan Factors-Exercise tolerance (METS): >4 METS  Chart reviewed  EKG reviewed  Imaging results reviewed  Existing labs reviewed  Patient summary reviewed  Patient is not a current smoker  Obstructive sleep apnea risk education given perioperatively  Induction- intravenous  Postoperative Plan- Plan for postoperative opioid use  Planned trial extubation    Informed Consent- Anesthetic plan and risks discussed with patient  I personally reviewed this patient with the CRNA  Discussed and agreed on the Anesthesia Plan with the CRNA  Brooks Langford Anesthesia plan and consent discussed with Nelson Rico who expressed understanding and agreement  Risks/benefits and alternatives discussed with patient including possible PONV, sore throat, damage to teeth/lips/gums and possibility of rare anesthetic and surgical emergencies

## 2022-06-18 NOTE — OP NOTE
OPERATIVE REPORT  PATIENT NAME: Ilan Gloria    :  1955  MRN: [id number]  Pt Location:  OR ROOM 08    SURGERY DATE: 2022    Surgeon(s) and Role:     * Diane Griffin, DO - Primary     * Paxton Butcher DO - Fellow    Preop Diagnosis:  Stenosis of left carotid artery [I65 22]    Post-Op Diagnosis Codes:     * Stenosis of left carotid artery [I65 22]    Procedure(s) (LRB):  ENDARTERECTOMY ARTERY CAROTID WITH PATCH ANGIOPLASTY (Left)    Specimen(s):  * No specimens in log *    Estimated Blood Loss:   Minimal    Drains:  * No LDAs found *    Anesthesia Type:   General    Operative Indications:  Stenosis of left carotid artery [I65 22]  Symptomatic stenosis of left carotid artery, left CVA    Operative Findings:  Severe stenosis of left internal carotid artery extending from the carotid bifurcation to approximately 3 cm into the internal carotid artery  Plaque appeared ulcerated  Complications:   None    Procedure and Technique: On the date of surgery, the patient was taken to the operating room placed supine on the OR table  General anesthesia was administered and the patient was intubated by the anesthesia team   A radial arterial line was placed  The patient was then prepped with a shoulder roll under the shoulders the neck extended and rotated to the right  The right arm was left out on arm board and the left arm was tucked  Duplex ultrasound was used to identify the carotid bifurcation  The patient was then prepped and draped in the normal sterile fashion  A time-out procedure was performed identifying and confirming the correct patient and procedure  A longitudinal skin incision was made along the anterior aspect of the sternocleidomastoid and centered over the carotid bifurcation, this was carried down through the subcutaneous tissue  The incision was deepened along the length of the wound through the platysma muscle until the sternocleidomastoid was identified    The sternocleidomastoid was dissected along the length of the incision, along the anterior border thereby exposing the internal jugular vein  Internal jugular vein was dissected along the length of the incision of note the facial vein was identified this was ligated clipped and then divided  The vagus nerve was then identified deep to the internal jugular vein and lateral to the common carotid artery  The common carotid artery was dissected circumferentially and a vessel loop was placed in a [place] fashion  The external carotid artery was then dissected circumferentially as well as the superior thyroid artery, these are both controlled with a vessel loop in a Morin fashion  Attention was then turned towards the internal carotid artery which was dissected circumferentially followed by placement of a vessel loop in a [place] fashion  Of note the hypoglossal nerve was identified coursing over the distal internal carotid artery this was protected throughout the course of the operation  The patient was systemically heparinized with 9000 units of heparin after which an  ACT was obtained which resulted at approximately 290 seconds  At this point a shunt was prepared and the internal carotid vessel loop was pulled taut followed by the common carotid artery vessel loop and finally the external carotid artery vessel  Using an 11 blade scalpel an arteriotomy was made in the distal common carotid artery and this was extended with a [place] scissor onto the internal carotid artery  An 8 [place] shunt was then placed in standard fashion  Attention was then turned towards performing the endarterectomy which was done in standard fashion obtaining adequate proximal and distal endpoints  Of note several 7 0 Prolene sutures were used to tack the distal endpoint  The endarterectomy site was then flushed with heparinized saline and attention was turned towards patch angioplasty    A bovine pericardial patch was used to perform the patch angioplasty with a 6 0 Prolene suture in a running fashion  Prior to completion of the patch angioplasty the shunt was removed and the vessels were forward bled and back bled and flushed with heparinized saline  The patch angioplasty was completed and flow was restored to the external carotid artery followed by the internal carotid artery  Upon inspection of the patch there was 1 bleeding point which was repaired with a 6 0 Prolene suture  Attention was then turned towards completion duplex ultrasound which revealed normal flow velocities and a patent endarterectomy site without any internal defects  The wound was copiously irrigated and then packed with fibrillar hemostatic agent  The patient was given 40 mg of protamine to reverse the affects of systemic heparinization  Attention was then turned towards wound closure which was accomplished with a 3-0 Monocryl to approximate the platysma followed by a 4-0 Monocryl in a running subcuticular fashion  Skin adhesive was applied to the incision  A sterile dressing was then applied  The patient was reversed from anesthesia and extubated without complication  The patient woke up neurologically intact able to follow commands and move all extremities  Of note there was a slight tongue deviation to the ipsilateral left side        Dr Bita Taylor was present for the entire procedure    Patient Disposition:  PACU       SIGNATURE: Toi Mortensen,   DATE: June 18, 2022  TIME: 12:00 PM            Vascular Quality Initiative - Carotid Endarterectomy     Urgency:  Urgent    Anesthesia: General Type: Conventional    Side: left    Patch Type: Bovine Pericardial     If Prosthetic, Patch : Breanne    Shunt: Yes- Routine    Skin Prep: Chlorhexidine    Drain: no  Heparin: yes    Protamine: yes      Dextran: no     Re-explore artery after closure: no    Total Procedure time: 120min    Monitoring:   EEG: no   Stump Pressure: no   Other: no    Completion Study:   Doppler: no   Duplex: yes   Arteriogram: no    Concomitant Procedure:   Proximal Endovascular: no   Distal Endovascular: no   CABG: no   Other Arterial Op: no

## 2022-06-18 NOTE — ANESTHESIA PROCEDURE NOTES
Arterial Line Insertion  Performed by: Sergio Peabody, MD  Authorized by: Sergio Peabody, MD   Preparation: Patient was prepped and draped in the usual sterile fashion    Indications: hemodynamic monitoring    Procedure Details:  Needle gauge: 20    Post-procedure:  Post-procedure: dressing applied  Waveform: good waveform  Patient tolerance: Patient tolerated the procedure well with no immediate complications

## 2022-06-19 LAB
ANION GAP SERPL CALCULATED.3IONS-SCNC: 6 MMOL/L (ref 4–13)
APTT PPP: 30 SECONDS (ref 23–37)
BUN SERPL-MCNC: 12 MG/DL (ref 5–25)
CALCIUM SERPL-MCNC: 8.6 MG/DL (ref 8.3–10.1)
CHLORIDE SERPL-SCNC: 107 MMOL/L (ref 100–108)
CO2 SERPL-SCNC: 27 MMOL/L (ref 21–32)
CREAT SERPL-MCNC: 0.86 MG/DL (ref 0.6–1.3)
ERYTHROCYTE [DISTWIDTH] IN BLOOD BY AUTOMATED COUNT: 13 % (ref 11.6–15.1)
GFR SERPL CREATININE-BSD FRML MDRD: 89 ML/MIN/1.73SQ M
GLUCOSE SERPL-MCNC: 152 MG/DL (ref 65–140)
HCT VFR BLD AUTO: 42 % (ref 36.5–49.3)
HGB BLD-MCNC: 14.3 G/DL (ref 12–17)
INR PPP: 1.01 (ref 0.84–1.19)
MCH RBC QN AUTO: 29.4 PG (ref 26.8–34.3)
MCHC RBC AUTO-ENTMCNC: 34 G/DL (ref 31.4–37.4)
MCV RBC AUTO: 86 FL (ref 82–98)
PLATELET # BLD AUTO: 191 THOUSANDS/UL (ref 149–390)
PMV BLD AUTO: 9.2 FL (ref 8.9–12.7)
POTASSIUM SERPL-SCNC: 3.5 MMOL/L (ref 3.5–5.3)
PROTHROMBIN TIME: 12.9 SECONDS (ref 11.6–14.5)
RBC # BLD AUTO: 4.87 MILLION/UL (ref 3.88–5.62)
SODIUM SERPL-SCNC: 140 MMOL/L (ref 136–145)
WBC # BLD AUTO: 12.42 THOUSAND/UL (ref 4.31–10.16)

## 2022-06-19 PROCEDURE — 80048 BASIC METABOLIC PNL TOTAL CA: CPT | Performed by: STUDENT IN AN ORGANIZED HEALTH CARE EDUCATION/TRAINING PROGRAM

## 2022-06-19 PROCEDURE — 85027 COMPLETE CBC AUTOMATED: CPT | Performed by: STUDENT IN AN ORGANIZED HEALTH CARE EDUCATION/TRAINING PROGRAM

## 2022-06-19 PROCEDURE — 85730 THROMBOPLASTIN TIME PARTIAL: CPT | Performed by: STUDENT IN AN ORGANIZED HEALTH CARE EDUCATION/TRAINING PROGRAM

## 2022-06-19 PROCEDURE — 99232 SBSQ HOSP IP/OBS MODERATE 35: CPT | Performed by: INTERNAL MEDICINE

## 2022-06-19 PROCEDURE — 99024 POSTOP FOLLOW-UP VISIT: CPT | Performed by: SURGERY

## 2022-06-19 PROCEDURE — 99233 SBSQ HOSP IP/OBS HIGH 50: CPT | Performed by: INTERNAL MEDICINE

## 2022-06-19 PROCEDURE — 85610 PROTHROMBIN TIME: CPT | Performed by: STUDENT IN AN ORGANIZED HEALTH CARE EDUCATION/TRAINING PROGRAM

## 2022-06-19 RX ORDER — LANOLIN ALCOHOL/MO/W.PET/CERES
6 CREAM (GRAM) TOPICAL
Status: DISCONTINUED | OUTPATIENT
Start: 2022-06-19 | End: 2022-06-21 | Stop reason: HOSPADM

## 2022-06-19 RX ORDER — AMLODIPINE BESYLATE 5 MG/1
5 TABLET ORAL DAILY
Status: DISCONTINUED | OUTPATIENT
Start: 2022-06-19 | End: 2022-06-20

## 2022-06-19 RX ADMIN — NICARDIPINE HYDROCHLORIDE 10 MG/HR: 2.5 INJECTION, SOLUTION INTRAVENOUS at 23:32

## 2022-06-19 RX ADMIN — BUDESONIDE AND FORMOTEROL FUMARATE DIHYDRATE 2 PUFF: 160; 4.5 AEROSOL RESPIRATORY (INHALATION) at 17:12

## 2022-06-19 RX ADMIN — HYDRALAZINE HYDROCHLORIDE 10 MG: 20 INJECTION INTRAMUSCULAR; INTRAVENOUS at 20:26

## 2022-06-19 RX ADMIN — BUDESONIDE AND FORMOTEROL FUMARATE DIHYDRATE 2 PUFF: 160; 4.5 AEROSOL RESPIRATORY (INHALATION) at 08:28

## 2022-06-19 RX ADMIN — AMLODIPINE BESYLATE 5 MG: 5 TABLET ORAL at 15:29

## 2022-06-19 RX ADMIN — ATORVASTATIN CALCIUM 80 MG: 80 TABLET, FILM COATED ORAL at 17:16

## 2022-06-19 RX ADMIN — Medication 6 MG: at 21:44

## 2022-06-19 RX ADMIN — TICAGRELOR 90 MG: 90 TABLET ORAL at 08:27

## 2022-06-19 RX ADMIN — LOSARTAN POTASSIUM: 50 TABLET, FILM COATED ORAL at 20:55

## 2022-06-19 RX ADMIN — CARVEDILOL 6.25 MG: 6.25 TABLET, FILM COATED ORAL at 15:30

## 2022-06-19 RX ADMIN — NICARDIPINE HYDROCHLORIDE 7.5 MG/HR: 2.5 INJECTION, SOLUTION INTRAVENOUS at 12:06

## 2022-06-19 RX ADMIN — TICAGRELOR 90 MG: 90 TABLET ORAL at 20:55

## 2022-06-19 RX ADMIN — NICARDIPINE HYDROCHLORIDE 12.5 MG/HR: 2.5 INJECTION, SOLUTION INTRAVENOUS at 21:40

## 2022-06-19 RX ADMIN — LABETALOL HYDROCHLORIDE 5 MG: 5 INJECTION INTRAVENOUS at 23:13

## 2022-06-19 RX ADMIN — CARVEDILOL 6.25 MG: 6.25 TABLET, FILM COATED ORAL at 08:27

## 2022-06-19 RX ADMIN — NICARDIPINE HYDROCHLORIDE 10 MG/HR: 2.5 INJECTION, SOLUTION INTRAVENOUS at 15:36

## 2022-06-19 RX ADMIN — NICARDIPINE HYDROCHLORIDE 15 MG/HR: 2.5 INJECTION, SOLUTION INTRAVENOUS at 19:41

## 2022-06-19 RX ADMIN — NICARDIPINE HYDROCHLORIDE 15 MG/HR: 2.5 INJECTION, SOLUTION INTRAVENOUS at 18:09

## 2022-06-19 RX ADMIN — NICARDIPINE HYDROCHLORIDE 2.5 MG/HR: 2.5 INJECTION, SOLUTION INTRAVENOUS at 03:44

## 2022-06-19 RX ADMIN — ASPIRIN 81 MG: 81 TABLET, COATED ORAL at 08:27

## 2022-06-19 NOTE — PROGRESS NOTES
[facility]    Cardiology Progress Note  Laine Costa 79 y o  male   YOB: 1955 MRN: [id number]  Unit/Bed#: [facility] 528-01 Encounter: [id number]      Subjective:   He underwent left carotid endarterectomy yesterday and is doing well since then  His blood pressure remains elevated and he is on a nicardipine infusion for the same    Assessments  69-year-old gentleman, who works as a , with history of CAD s/p PCI followed by robotic CABG with LIMA-LAD (at [facility]), history of hypertension, TIA, came in with symptoms of worsening right sided numbness over the last week (after initiating on carvedilol) and was found to have a TIA/small stroke  He was found to have 70-99% stenosis of the left internal carotid artery on vascular duplex, and this has been felt to be likely the cause of his stroke and as a result has been recommended a carotid endarterectomy  We are consulted for preoperative evaluation prior to the same        Principal Problem:    Acute CVA (cerebrovascular accident) [facility])  Active Problems:    S/P CABG (coronary artery bypass graft)    Mixed hyperlipidemia    Coronary artery disease involving native coronary artery of native heart without angina pectoris    Essential hypertension    Carotid artery stenosis    Thyroid nodule    Atrial fibrillation (HCC)    Elevated serum creatinine    Hypokalemia    Outpatient cardiologist is Dr Isatu Parsons   1  Preoperative cardiovascular examination   2  CAD status post CABG, status post PCI   3  Left carotid artery stenosis, severe   4  TIA   5  Hypertension   6  Hyperlipidemia   7   Paroxysmal Atrial Fibrillation           Plan   Acute CVA, s/p Left carotid endarterectomy  · MRI brain showed small multifocal acute infarcts in left frontal and left occipital lobes  · This was felt to be related to his carotid stenosis, and he underwent left carotid endarterectomy yesterday and is doing well after this  · Continue aspirin, ticagrelor, atorvastatin per primary service/neurology    CAD s/p CABG, s/p PCI  · No current or recent ischemic symptoms  · Stress echo in April 2022 was negative at his outside cardiologist office  · Continue aspirin, atorvastatin    Hypertension, uncontrolled  · Blood pressure continues to be uncontrolled in the 105D to 215 systolic  · He has been on nicardipine drip, and was down to 2 5, but has needed this to be titrated back up to 7 5 the time my evaluation due to continue blood pressures in the 160s  · He is on carvedilol 6 25 mg b i d , but his heart rate is in the 50s, and there is not much room to uptitrate the same  · Will add amlodipine 5 mg daily, and try and titrate off nicardipine  · He did get about 4 L of fluid including 2 9 L of normal saline, which may be contributing to this, in addition to the stress from the surgery      Review of Systems   All other systems reviewed and are negative  Telemetry Review: Not on telemetry    Objective:   Vitals: Blood pressure (!) 172/79, pulse 64, temperature 98 °F (36 7 °C), temperature source Oral, resp  rate 18, height 6' 4" (1 93 m), weight 101 kg (223 lb), SpO2 96 %  , Body mass index is 27 14 kg/m² ,   Orthostatic Blood Pressures    Flowsheet Row Most Recent Value   Blood Pressure 172/79 filed at 06/19/2022 0800   Patient Position - Orthostatic VS Lying filed at 06/19/2022 1673         Systolic (20ILK), DDV:447 , Min:132 , RVR:427     Diastolic (48ZZL), ROBBIN:29, Min:60, Max:79    Wt Readings from Last 5 Encounters:   06/16/22 101 kg (223 lb)   06/16/22 102 kg (224 lb 3 3 oz)   06/15/22 101 kg (223 lb 5 2 oz)   05/11/21 112 kg (245 lb 13 oz)   04/14/21 112 kg (246 lb)     I/O       06/17 0701  06/18 0700 06/18 0701  06/19 0700 06/19 0701  06/20 0700    P  O        I V  (mL/kg)  4266 3 (42 2)     IV Piggyback  50     Total Intake(mL/kg)  4316 3 (42 7)     Urine (mL/kg/hr)  3525 (1 5)     Total Output  3525     Net  +791 3 Unmeasured Urine Occurrence 3 x            Physical Exam  Vitals and nursing note reviewed  Constitutional:       General: He is not in acute distress  Appearance: He is well-developed  He is not ill-appearing or diaphoretic  HENT:      Head: Normocephalic and atraumatic  Nose: No congestion  Eyes:      General: No scleral icterus  Conjunctiva/sclera: Conjunctivae normal    Neck:      Vascular: No carotid bruit or JVD  Comments: Large left neck surgical wound noted - related to recent left carotid endarterectomy  Cardiovascular:      Rate and Rhythm: Normal rate and regular rhythm  Heart sounds: Normal heart sounds  No murmur heard  No friction rub  No gallop  Pulmonary:      Effort: Pulmonary effort is normal  No respiratory distress  Breath sounds: Normal breath sounds  No wheezing or rales  Chest:      Chest wall: No tenderness  Abdominal:      General: There is no distension  Palpations: Abdomen is soft  Tenderness: There is no abdominal tenderness  Musculoskeletal:         General: No swelling, tenderness or deformity  Cervical back: Neck supple  No muscular tenderness  Right lower leg: No edema  Left lower leg: No edema  Skin:     General: Skin is warm  Neurological:      General: No focal deficit present  Mental Status: He is alert and oriented to person, place, and time  Mental status is at baseline  Psychiatric:         Mood and Affect: Mood normal          Behavior: Behavior normal          Thought Content:  Thought content normal            Laboratory Results: personally reviewed        CBC with diff:   Results from last 7 days   Lab Units 06/19/22  0520 06/17/22  0613 06/16/22  0638 06/15/22  2202   WBC Thousand/uL 12 42* 8 23  --  7 03   HEMOGLOBIN g/dL 14 3 15 9  --  14 8   HEMATOCRIT % 42 0 48 6  --  46 1   MCV fL 86 91  --  91   PLATELETS Thousands/uL 191 205 202 208   MCH pg 29 4 29 7  --  29 1   MCHC g/dL 34 0 32 7  -- 32 1   RDW % 13 0 13 1  --  13 0   MPV fL 9 2 9 6 10 3 9 6   NRBC AUTO /100 WBCs  --  0  --  0         CMP:  Results from last 7 days   Lab Units 06/19/22  0520 06/18/22 0459 06/17/22  2349 06/17/22  0613 06/15/22  2202   POTASSIUM mmol/L 3 5 3 5 3 9 2 9* 3 3*   CHLORIDE mmol/L 107 107  --  105 103   CO2 mmol/L 27 26  --  30 30   BUN mg/dL 12 15  --  14 21   CREATININE mg/dL 0 86 1 10  --  0 98 1 44*   CALCIUM mg/dL 8 6 8 8  --  8 6 8 8   AST U/L  --  12  --  12 18   ALT U/L  --  19  --  19 27   ALK PHOS U/L  --  73  --  64 81   EGFR ml/min/1 73sq m 89 69  --  79 49         BMP:  Results from last 7 days   Lab Units 06/19/22  0520 06/18/22 0459 06/17/22  2349 06/17/22  0613 06/15/22  2202   POTASSIUM mmol/L 3 5 3 5 3 9 2 9* 3 3*   CHLORIDE mmol/L 107 107  --  105 103   CO2 mmol/L 27 26  --  30 30   BUN mg/dL 12 15  --  14 21   CREATININE mg/dL 0 86 1 10  --  0 98 1 44*   CALCIUM mg/dL 8 6 8 8  --  8 6 8 8       BNP: No results for input(s): BNP in the last 72 hours      Magnesium:   Results from last 7 days   Lab Units 06/17/22  0613   MAGNESIUM mg/dL 2 5       Coags:   Results from last 7 days   Lab Units 06/19/22  0520 06/15/22  2202   PTT seconds 30 33   INR  1 01 0 96       TSH:        Hemoglobin A1C   Results from last 7 days   Lab Units 06/16/22  0638 06/15/22  2202   HEMOGLOBIN A1C % 6 0* 6 1*       Lipid Profile:   Results from last 7 days   Lab Units 06/16/22  0853 06/16/22  0638 06/15/22  2202   TRIGLYCERIDES mg/dL 152* 142 230*   HDL mg/dL 47 47 42       Meds/Allergies   all current active meds have been reviewed and current meds:   Current Facility-Administered Medications   Medication Dose Route Frequency    acetaminophen (TYLENOL) tablet 650 mg  650 mg Oral Q4H PRN    aspirin (ECOTRIN LOW STRENGTH) EC tablet 81 mg  81 mg Oral Daily    atorvastatin (LIPITOR) tablet 80 mg  80 mg Oral QPM    budesonide-formoterol (SYMBICORT) 160-4 5 mcg/act inhaler 2 puff  2 puff Inhalation BID    carvedilol (COREG) tablet 6 25 mg  6 25 mg Oral BID With Meals    heparin (porcine) subcutaneous injection 5,000 Units  5,000 Units Subcutaneous Q8H [address]    hydrALAZINE (APRESOLINE) injection 10 mg  10 mg Intravenous Q6H PRN    labetalol (NORMODYNE) injection 5 mg  5 mg Intravenous Q15 Min PRN    And    hydrALAZINE (APRESOLINE) injection 15 mg  15 mg Intravenous Q15 Min PRN    And    niCARdipine (CARDENE) 25 mg (STANDARD CONCENTRATION) in sodium chloride 0 9% 250 mL  1-15 mg/hr Intravenous Continuous PRN    niCARdipine (CARDENE) 25 mg (STANDARD CONCENTRATION) in sodium chloride 0 9% 250 mL  1-15 mg/hr Intravenous Continuous    ondansetron (ZOFRAN) injection 4 mg  4 mg Intravenous Q6H PRN    oxyCODONE (ROXICODONE) IR tablet 10 mg  10 mg Oral Q4H PRN    oxyCODONE (ROXICODONE) IR tablet 5 mg  5 mg Oral Q4H PRN    sodium chloride 0 9 % bolus 500 mL  500 mL Intravenous Once PRN    Followed by    phenylephrine (ARNOLDO-SYNEPHRINE) 50 mg (STANDARD CONCENTRATION) in sodium chloride 0 9% 250 mL   mcg/min Intravenous Continuous PRN    ticagrelor (BRILINTA) tablet 90 mg  90 mg Oral Q12H [facility]     Medications Prior to Admission   Medication    carvedilol (COREG CR) 40 MG 24 hr capsule    albuterol (PROVENTIL HFA,VENTOLIN HFA) 90 mcg/act inhaler    aspirin (ECOTRIN LOW STRENGTH) 81 mg EC tablet    ezetimibe (ZETIA) 10 mg tablet    losartan (COZAAR) 50 mg tablet    nitroglycerin (NITROSTAT) 0 4 mg SL tablet    Symbicort 160-4 5 MCG/ACT inhaler     niCARdipine (CARDENE) 25 mg (STANDARD CONCENTRATION) in sodium chloride 0 9% 250 mL, 1-15 mg/hr, Last Rate: Stopped (06/18/22 1904)  niCARdipine, 1-15 mg/hr, Last Rate: 2 5 mg/hr (06/19/22 0600)  phenylephine,  mcg/min          Cardiac testing: reviewed  No results found for this or any previous visit  No results found for this or any previous visit  No results found for this or any previous visit  No results found for this or any previous visit

## 2022-06-19 NOTE — ASSESSMENT & PLAN NOTE
MRI brain imaging reveals multifocal small infarcts left frontal and left occipital lobes  Imaging studies revealed severe left proximal internal carotid artery stenosis  Continue aspirin Brilinta statin  Neurology input noted

## 2022-06-19 NOTE — ASSESSMENT & PLAN NOTE
CTA head and neck severe stenosis left proximal ICA  Continue aspirin Brilinta statin  Status post carotid endarterectomy with vascular surgery  Postop management per vascular surgery

## 2022-06-19 NOTE — PROGRESS NOTES
[address] [facility]  Progress Note - Roula Corley 1955, 79 y o  male MRN: [id number]  Unit/Bed#: [facility] 528-01 Encounter: [id number]  Primary Care Provider: No primary care provider on file  Date and time admitted to hospital: 6/16/2022  5:43 AM    * Acute CVA (cerebrovascular accident) [facility])  Assessment & Plan  MRI brain imaging reveals multifocal small infarcts left frontal and left occipital lobes  Imaging studies revealed severe left proximal internal carotid artery stenosis  Continue aspirin Brilinta statin  Neurology input noted      Carotid artery stenosis  Assessment & Plan  CTA head and neck severe stenosis left proximal ICA  Continue aspirin Brilinta statin  Status post carotid endarterectomy with vascular surgery  Postop management per vascular surgery    Hypokalemia  Assessment & Plan  Replete  Monitor    Elevated serum creatinine  Assessment & Plan  Suspect some element of CKD, no prior labs in our system     · Monitor kidney function closely  · Avoid hypotension  · Monitor postvoid residuals    Atrial fibrillation [facility])  Assessment & Plan  Cardiology notes reviewed -  History of PAF post MI and spontaneously converted to sinus rhythm  Presently not on anticoagulation  Cardiology input noted plans for outpatient loop recorder noted      Thyroid nodule  Assessment & Plan  Noted on CTA H&N  · Outpatient thyroid U/S recommended     Essential hypertension  Assessment & Plan  Hypertensive urgency on admission  Blood pressure is now improving  Monitor blood pressures  Avoid hypotension    Coronary artery disease involving native coronary artery of native heart without angina pectoris  Assessment & Plan  CAD - s/p SHERIF lt circ 2019, SHERIF OM1 and distal RCA with robotic CABG- LIMA to LAD-diagonal 4/2020  · Continue aspirin statin Brilinta beta-blocker  · Cardiology input noted    Mixed hyperlipidemia  Assessment & Plan  · Continue atorvastatin    S/P CABG (coronary artery bypass graft)  Assessment & Plan  Continue aspirin statin beta-blocker          VTE Pharmacologic Prophylaxis: VTE Score: 7 High Risk (Score >/= 5) - Pharmacological DVT Prophylaxis Ordered: heparin  Sequential Compression Devices Ordered  Patient Centered Rounds: I performed bedside rounds with nursing staff today  Discussions with Specialists or Other Care Team Provider:     Education and Discussions with Family / Patient: Discussed with the patient, reports he is keeping his family updated  Time Spent for Care: 30 minutes  More than 50% of total time spent on counseling and coordination of care as described above  Current Length of Stay: 3 day(s)  Current Patient Status: Inpatient   Certification Statement: The patient will continue to require additional inpatient hospital stay due to As outlined  Discharge Plan: Awaiting vascular inputs for disposition planning    Code Status: Level 1 - Full Code    Subjective:     Comfortably in bed  Reports feeling okay  Encouraged out of bed into chair and incentive spirometry as able    Objective:     Vitals:   Temp (24hrs), Av 2 °F (36 8 °C), Min:98 °F (36 7 °C), Max:98 4 °F (36 9 °C)    Temp:  [98 °F (36 7 °C)-98 4 °F (36 9 °C)] 98 °F (36 7 °C)  HR:  [64-88] 64  Resp:  [14-18] 18  BP: (132-187)/(60-79) 187/70  SpO2:  [95 %-97 %] 96 %  Body mass index is 27 14 kg/m²  Input and Output Summary (last 24 hours):      Intake/Output Summary (Last 24 hours) at 2022 1524  Last data filed at 2022 2342  Gross per 24 hour   Intake 1216 25 ml   Output 1325 ml   Net -108 75 ml       Physical Exam:   Physical Exam     Comfortably in bed  Mucous meds are moist  Lungs diminished breath sounds bilateral  Heart sounds S1-S2 noted  Abdomen soft  Awake alert obeys simple commands  No pedal edema  No rash    Additional Data:     Labs:  Results from last 7 days   Lab Units 22  0520 22  0613   WBC Thousand/uL 12 42* 8 23   HEMOGLOBIN g/dL 14 3 15 9 HEMATOCRIT % 42 0 48 6   PLATELETS Thousands/uL 191 205   NEUTROS PCT %  --  75   LYMPHS PCT %  --  14   MONOS PCT %  --  9   EOS PCT %  --  2     Results from last 7 days   Lab Units 06/19/22  0520 06/18/22  0459   SODIUM mmol/L 140 139   POTASSIUM mmol/L 3 5 3 5   CHLORIDE mmol/L 107 107   CO2 mmol/L 27 26   BUN mg/dL 12 15   CREATININE mg/dL 0 86 1 10   ANION GAP mmol/L 6 6   CALCIUM mg/dL 8 6 8 8   ALBUMIN g/dL  --  3 3*   TOTAL BILIRUBIN mg/dL  --  1 71*   ALK PHOS U/L  --  73   ALT U/L  --  19   AST U/L  --  12   GLUCOSE RANDOM mg/dL 152* 125     Results from last 7 days   Lab Units 06/19/22  0520   INR  1 01     Results from last 7 days   Lab Units 06/18/22  1835 06/18/22  1215 06/15/22  2132   POC GLUCOSE mg/dl 202* 155* 159*     Results from last 7 days   Lab Units 06/16/22  0638 06/15/22  2202   HEMOGLOBIN A1C % 6 0* 6 1*           Lines/Drains:  Invasive Devices  Report    Peripheral Intravenous Line  Duration           Peripheral IV 06/17/22 Left;Ventral (anterior) Forearm 2 days    Peripheral IV 06/18/22 Left Forearm 1 day    Peripheral IV 06/18/22 Right Forearm 1 day          Arterial Line  Duration           Arterial Line 06/18/22 1 day                      Imaging: No pertinent imaging reviewed      Recent Cultures (last 7 days):         Last 24 Hours Medication List:   Current Facility-Administered Medications   Medication Dose Route Frequency Provider Last Rate    acetaminophen  650 mg Oral Q4H PRN Michelle Kuhn DO      amLODIPine  5 mg Oral Daily [address] MD      aspirin  81 mg Oral Daily Michelle Kuhn DO      atorvastatin  80 mg Oral QPM Michelle Kuhn DO      budesonide-formoterol  2 puff Inhalation BID Michelle Kuhn DO      carvedilol  6 25 mg Oral BID With Meals Michelle Kuhn DO      heparin (porcine)  5,000 Units Subcutaneous [facility] Michelle Kuhn DO      hydrALAZINE  10 mg Intravenous Q6H PRN Michelle Kuhn DO      labetalol  5 mg Intravenous Q15 Min PRN Darolyn Hew, DO      And    hydrALAZINE  15 mg Intravenous Q15 Min PRN Darolyn Hew, DO      And    niCARdipine  1-15 mg/hr Intravenous Continuous PRN Darolyn Hew, DO 12 5 mg/hr (06/19/22 1441)    niCARdipine (CARDENE) 25 mg (STANDARD CONCENTRATION) in sodium chloride 0 9% 250 mL  1-15 mg/hr Intravenous Continuous Aldean Collyer, CRNA Stopped (06/18/22 1904)    ondansetron  4 mg Intravenous Q6H PRN Darolyn Hew, DO      oxyCODONE  10 mg Oral Q4H PRN Darolyn Hew, DO      oxyCODONE  5 mg Oral Q4H PRN Darolyn Hew, DO      phenylephine   mcg/min Intravenous Continuous PRN Darolyn Hew, DO      ticagrelor  90 mg Oral Q12H [facility] Darolyn Hew, DO          Today, Patient Was Seen By: Celia Mireles MD    **Please Note: This note may have been constructed using a voice recognition system  **

## 2022-06-19 NOTE — PROGRESS NOTES
Progress Note - Vascular Surgery   Valorie Freeman 79 y o  male MRN: [id number]  Unit/Bed#: [facility] 528-01 Encounter: [id number]        Assessment:  79year old male with L CVA s/p Left CEA on 6/18/22    Plan:  -continue aspirin and statin  -Systolic BP goal: >731/ to <160/  -wean cardene as able  -once patient off cardene can remove arterial line and downgrade to med-surg  -okay to be out of bed  -long discussion with patient regarding the necessity of BP control going forward   -prn pain meds  -dvt ppx     Gloria Lala, DO  6/19/2022        Subjective:  Patient seen and examined  No acute events overnight  Had been requiring cardene for BP goals  Denies headache/chest pain/shortness of breath  Minimal discomfort in left neck  Denies recurrence of stroke symptoms    Vitals:  /71 (BP Location: Left arm)   Pulse 64   Temp 98 °F (36 7 °C) (Oral)   Resp 18   Ht 6' 4" (1 93 m)   Wt 101 kg (223 lb)   SpO2 96%   BMI 27 14 kg/m²     I/Os:  I/O last 3 completed shifts: In: 4316 3 [I V :4266 3; IV Piggyback:50]  Out: 2280 [Urine:3525]  No intake/output data recorded      Lab Results and Cultures:   CBC with diff:   Lab Results   Component Value Date    WBC 12 42 (H) 06/19/2022    HGB 14 3 06/19/2022    HCT 42 0 06/19/2022    MCV 86 06/19/2022     06/19/2022    MCH 29 4 06/19/2022    MCHC 34 0 06/19/2022    RDW 13 0 06/19/2022    MPV 9 2 06/19/2022    NRBC 0 06/17/2022   ,   BMP/CMP:  Lab Results   Component Value Date    SODIUM 140 06/19/2022    K 3 5 06/19/2022     06/19/2022    CO2 27 06/19/2022    BUN 12 06/19/2022    CREATININE 0 86 06/19/2022    CALCIUM 8 6 06/19/2022    AST 12 06/18/2022    ALT 19 06/18/2022    ALKPHOS 73 06/18/2022    EGFR 89 06/19/2022   ,   Lipid Panel: No results found for: CHOL,   Coags:   Lab Results   Component Value Date    PTT 30 06/19/2022    INR 1 01 06/19/2022   ,     Blood Culture: No results found for: BLOODCX,   Urinalysis:   Lab Results   Component Value Date COLORU Yellow 03/22/2021    CLARITYU Clear 03/22/2021    SPECGRAV 1 025 03/22/2021    PHUR 6 0 03/22/2021    LEUKOCYTESUR Negative 03/22/2021    NITRITE Negative 03/22/2021    GLUCOSEU Trace (A) 03/22/2021    KETONESU Negative 03/22/2021    BILIRUBINUR Negative 03/22/2021    BLOODU Negative 03/22/2021   ,   Urine Culture: No results found for: URINECX,   Wound Culure: No results found for: WOUNDCULT    Medications:  Current Facility-Administered Medications   Medication Dose Route Frequency    acetaminophen (TYLENOL) tablet 650 mg  650 mg Oral Q4H PRN    aspirin (ECOTRIN LOW STRENGTH) EC tablet 81 mg  81 mg Oral Daily    atorvastatin (LIPITOR) tablet 80 mg  80 mg Oral QPM    budesonide-formoterol (SYMBICORT) 160-4 5 mcg/act inhaler 2 puff  2 puff Inhalation BID    carvedilol (COREG) tablet 6 25 mg  6 25 mg Oral BID With Meals    heparin (porcine) subcutaneous injection 5,000 Units  5,000 Units Subcutaneous Q8H [facility]    hydrALAZINE (APRESOLINE) injection 10 mg  10 mg Intravenous Q6H PRN    labetalol (NORMODYNE) injection 5 mg  5 mg Intravenous Q15 Min PRN    And    hydrALAZINE (APRESOLINE) injection 15 mg  15 mg Intravenous Q15 Min PRN    And    niCARdipine (CARDENE) 25 mg (STANDARD CONCENTRATION) in sodium chloride 0 9% 250 mL  1-15 mg/hr Intravenous Continuous PRN    lactated ringers infusion  75 mL/hr Intravenous Continuous    niCARdipine (CARDENE) 25 mg (STANDARD CONCENTRATION) in sodium chloride 0 9% 250 mL  1-15 mg/hr Intravenous Continuous    ondansetron (ZOFRAN) injection 4 mg  4 mg Intravenous Q6H PRN    oxyCODONE (ROXICODONE) IR tablet 10 mg  10 mg Oral Q4H PRN    oxyCODONE (ROXICODONE) IR tablet 5 mg  5 mg Oral Q4H PRN    sodium chloride 0 9 % bolus 500 mL  500 mL Intravenous Once PRN    Followed by    phenylephrine (ARNOLDO-SYNEPHRINE) 50 mg (STANDARD CONCENTRATION) in sodium chloride 0 9% 250 mL   mcg/min Intravenous Continuous PRN    ticagrelor (BRILINTA) tablet 90 mg  90 mg Oral Q12H [address]       Imaging:  No new imaging to review    Physical Exam:    General: aaox3, NAD  CV: regular rate   Respiratory: no distress  Abdominal: soft, non tender  Neck: left neck dressing c/d/i, dressing removed, minimal ecchymosis/swelling, minimally tender to palpation  Neurologic: tongue deviation to left (as expected due to retraction of hypoglossal nerve intra-operatively), otherwise grossly intact

## 2022-06-19 NOTE — PLAN OF CARE
Problem: Nutrition/Hydration-ADULT  Goal: Nutrient/Hydration intake appropriate for improving, restoring or maintaining nutritional needs  Description: Monitor and assess patient's nutrition/hydration status for malnutrition  Collaborate with interdisciplinary team and initiate plan and interventions as ordered  Monitor patient's weight and dietary intake as ordered or per policy  Utilize nutrition screening tool and intervene as necessary  Determine patient's food preferences and provide high-protein, high-caloric foods as appropriate       INTERVENTIONS:  - Monitor oral intake, urinary output, labs, and treatment plans  - Assess nutrition and hydration status and recommend course of action  - Evaluate amount of meals eaten  - Assist patient with eating if necessary   - Allow adequate time for meals  - Recommend/ encourage appropriate diets, oral nutritional supplements, and vitamin/mineral supplements  - Order, calculate, and assess calorie counts as needed  - Recommend, monitor, and adjust tube feedings and TPN/PPN based on assessed needs  - Assess need for intravenous fluids  - Provide specific nutrition/hydration education as appropriate  - Include patient/family/caregiver in decisions related to nutrition  Outcome: Progressing     Problem: PAIN - ADULT  Goal: Verbalizes/displays adequate comfort level or baseline comfort level  Description: Interventions:  - Encourage patient to monitor pain and request assistance  - Assess pain using appropriate pain scale  - Administer analgesics based on type and severity of pain and evaluate response  - Implement non-pharmacological measures as appropriate and evaluate response  - Consider cultural and social influences on pain and pain management  - Notify physician/advanced practitioner if interventions unsuccessful or patient reports new pain  Outcome: Progressing     Problem: INFECTION - ADULT  Goal: Absence or prevention of progression during hospitalization  Description: INTERVENTIONS:  - Assess and monitor for signs and symptoms of infection  - Monitor lab/diagnostic results  - Monitor all insertion sites, i e  indwelling lines, tubes, and drains  - Monitor endotracheal if appropriate and nasal secretions for changes in amount and color  - [place] appropriate cooling/warming therapies per order  - Administer medications as ordered  - Instruct and encourage patient and family to use good hand hygiene technique  - Identify and instruct in appropriate isolation precautions for identified infection/condition  Outcome: Progressing  Goal: Absence of fever/infection during neutropenic period  Description: INTERVENTIONS:  - Monitor WBC    Outcome: Progressing     Problem: SAFETY ADULT  Goal: Patient will remain free of falls  Description: INTERVENTIONS:  - Educate patient/family on patient safety including physical limitations  - Instruct patient to call for assistance with activity   - Consult OT/PT to assist with strengthening/mobility   - Keep Call bell within reach  - Keep bed low and locked with side rails adjusted as appropriate  - Keep care items and personal belongings within reach  - Initiate and maintain comfort rounds  - Make Fall Risk Sign visible to staff  - Offer Toileting every   Hours, in advance of need  - Initiate/Maintain  alarm  - Obtain necessary fall risk management equipment:    - Apply yellow socks and bracelet for high fall risk patients  - Consider moving patient to room near nurses station  Outcome: Progressing  Goal: Maintain or return to baseline ADL function  Description: INTERVENTIONS:  -  Assess patient's ability to carry out ADLs; assess patient's baseline for ADL function and identify physical deficits which impact ability to perform ADLs (bathing, care of mouth/teeth, toileting, grooming, dressing, etc )  - Assess/evaluate cause of self-care deficits   - Assess range of motion  - Assess patient's mobility; develop plan if impaired  - Assess patient's need for assistive devices and provide as appropriate  - Encourage maximum independence but intervene and supervise when necessary  - Involve family in performance of ADLs  - Assess for home care needs following discharge   - Consider OT consult to assist with ADL evaluation and planning for discharge  - Provide patient education as appropriate  Outcome: Progressing  Goal: Maintains/Returns to pre admission functional level  Description: INTERVENTIONS:  - Perform BMAT or MOVE assessment daily    - Set and communicate daily mobility goal to care team and patient/family/caregiver  - Collaborate with rehabilitation services on mobility goals if consulted  - Perform Range of Motion   times a day  - Reposition patient every   hours    - Dangle patient   times a day  - Stand patient   times a day  - Ambulate patient   times a day  - Out of bed to chair   times a day   - Out of bed for meals  times a day  - Out of bed for toileting  - Record patient progress and toleration of activity level   Outcome: Progressing     Problem: DISCHARGE PLANNING  Goal: Discharge to home or other facility with appropriate resources  Description: INTERVENTIONS:  - Identify barriers to discharge w/patient and caregiver  - Arrange for needed discharge resources and transportation as appropriate  - Identify discharge learning needs (meds, wound care, etc )  - Arrange for interpretive services to assist at discharge as needed  - Refer to Case Management Department for coordinating discharge planning if the patient needs post-hospital services based on physician/advanced practitioner order or complex needs related to functional status, cognitive ability, or social support system  Outcome: Progressing     Problem: Knowledge Deficit  Goal: Patient/family/caregiver demonstrates understanding of disease process, treatment plan, medications, and discharge instructions  Description: Complete learning assessment and assess knowledge base    Interventions:  - Provide teaching at level of understanding  - Provide teaching via preferred learning methods  Outcome: Progressing     Problem: Potential for Falls  Goal: Patient will remain free of falls  Description: INTERVENTIONS:  - Educate patient/family on patient safety including physical limitations  - Instruct patient to call for assistance with activity   - Consult OT/PT to assist with strengthening/mobility   - Keep Call bell within reach  - Keep bed low and locked with side rails adjusted as appropriate  - Keep care items and personal belongings within reach  - Initiate and maintain comfort rounds  - Make Fall Risk Sign visible to staff  - Offer Toileting every   Hours, in advance of need  - Initiate/Maintain  alarm  - Obtain necessary fall risk management equipment:    - Apply yellow socks and bracelet for high fall risk patients  - Consider moving patient to room near nurses station  Outcome: Progressing

## 2022-06-20 ENCOUNTER — DOCUMENTATION (OUTPATIENT)
Dept: VASCULAR SURGERY | Facility: CLINIC | Age: 67
End: 2022-06-20

## 2022-06-20 PROBLEM — D72.829 LEUKOCYTOSIS: Status: ACTIVE | Noted: 2022-06-20

## 2022-06-20 LAB
ANION GAP SERPL CALCULATED.3IONS-SCNC: 3 MMOL/L (ref 4–13)
BASOPHILS # BLD AUTO: 0.03 THOUSANDS/ΜL (ref 0–0.1)
BASOPHILS NFR BLD AUTO: 0 % (ref 0–1)
BUN SERPL-MCNC: 15 MG/DL (ref 5–25)
CALCIUM SERPL-MCNC: 8.7 MG/DL (ref 8.3–10.1)
CHLORIDE SERPL-SCNC: 105 MMOL/L (ref 100–108)
CO2 SERPL-SCNC: 30 MMOL/L (ref 21–32)
CREAT SERPL-MCNC: 0.83 MG/DL (ref 0.6–1.3)
EOSINOPHIL # BLD AUTO: 0.06 THOUSAND/ΜL (ref 0–0.61)
EOSINOPHIL NFR BLD AUTO: 1 % (ref 0–6)
ERYTHROCYTE [DISTWIDTH] IN BLOOD BY AUTOMATED COUNT: 13.2 % (ref 11.6–15.1)
GFR SERPL CREATININE-BSD FRML MDRD: 91 ML/MIN/1.73SQ M
GLUCOSE SERPL-MCNC: 120 MG/DL (ref 65–140)
HCT VFR BLD AUTO: 44.1 % (ref 36.5–49.3)
HGB BLD-MCNC: 14.7 G/DL (ref 12–17)
IMM GRANULOCYTES # BLD AUTO: 0.06 THOUSAND/UL (ref 0–0.2)
IMM GRANULOCYTES NFR BLD AUTO: 1 % (ref 0–2)
LYMPHOCYTES # BLD AUTO: 1.16 THOUSANDS/ΜL (ref 0.6–4.47)
LYMPHOCYTES NFR BLD AUTO: 9 % (ref 14–44)
MCH RBC QN AUTO: 29.8 PG (ref 26.8–34.3)
MCHC RBC AUTO-ENTMCNC: 33.3 G/DL (ref 31.4–37.4)
MCV RBC AUTO: 90 FL (ref 82–98)
MONOCYTES # BLD AUTO: 0.92 THOUSAND/ΜL (ref 0.17–1.22)
MONOCYTES NFR BLD AUTO: 7 % (ref 4–12)
NEUTROPHILS # BLD AUTO: 10.91 THOUSANDS/ΜL (ref 1.85–7.62)
NEUTS SEG NFR BLD AUTO: 82 % (ref 43–75)
NRBC BLD AUTO-RTO: 0 /100 WBCS
PLATELET # BLD AUTO: 201 THOUSANDS/UL (ref 149–390)
PMV BLD AUTO: 9.4 FL (ref 8.9–12.7)
POTASSIUM SERPL-SCNC: 3.1 MMOL/L (ref 3.5–5.3)
RBC # BLD AUTO: 4.93 MILLION/UL (ref 3.88–5.62)
SODIUM SERPL-SCNC: 138 MMOL/L (ref 136–145)
WBC # BLD AUTO: 13.14 THOUSAND/UL (ref 4.31–10.16)

## 2022-06-20 PROCEDURE — 99024 POSTOP FOLLOW-UP VISIT: CPT | Performed by: SURGERY

## 2022-06-20 PROCEDURE — 80048 BASIC METABOLIC PNL TOTAL CA: CPT | Performed by: INTERNAL MEDICINE

## 2022-06-20 PROCEDURE — 99232 SBSQ HOSP IP/OBS MODERATE 35: CPT | Performed by: INTERNAL MEDICINE

## 2022-06-20 PROCEDURE — 99232 SBSQ HOSP IP/OBS MODERATE 35: CPT | Performed by: FAMILY MEDICINE

## 2022-06-20 PROCEDURE — 85025 COMPLETE CBC W/AUTO DIFF WBC: CPT | Performed by: INTERNAL MEDICINE

## 2022-06-20 PROCEDURE — 99233 SBSQ HOSP IP/OBS HIGH 50: CPT | Performed by: PSYCHIATRY & NEUROLOGY

## 2022-06-20 RX ORDER — POTASSIUM CHLORIDE 20 MEQ/1
40 TABLET, EXTENDED RELEASE ORAL 2 TIMES DAILY
Status: COMPLETED | OUTPATIENT
Start: 2022-06-20 | End: 2022-06-20

## 2022-06-20 RX ORDER — LOSARTAN POTASSIUM 50 MG/1
50 TABLET ORAL DAILY
Status: DISCONTINUED | OUTPATIENT
Start: 2022-06-21 | End: 2022-06-20

## 2022-06-20 RX ORDER — CARVEDILOL 12.5 MG/1
12.5 TABLET ORAL 2 TIMES DAILY WITH MEALS
Status: DISCONTINUED | OUTPATIENT
Start: 2022-06-20 | End: 2022-06-21 | Stop reason: HOSPADM

## 2022-06-20 RX ADMIN — BUDESONIDE AND FORMOTEROL FUMARATE DIHYDRATE 2 PUFF: 160; 4.5 AEROSOL RESPIRATORY (INHALATION) at 17:25

## 2022-06-20 RX ADMIN — LABETALOL HYDROCHLORIDE 5 MG: 5 INJECTION INTRAVENOUS at 04:44

## 2022-06-20 RX ADMIN — AMLODIPINE BESYLATE 5 MG: 5 TABLET ORAL at 08:09

## 2022-06-20 RX ADMIN — POTASSIUM CHLORIDE 40 MEQ: 1500 TABLET, EXTENDED RELEASE ORAL at 08:09

## 2022-06-20 RX ADMIN — CARVEDILOL 12.5 MG: 12.5 TABLET, FILM COATED ORAL at 17:31

## 2022-06-20 RX ADMIN — ATORVASTATIN CALCIUM 80 MG: 80 TABLET, FILM COATED ORAL at 17:32

## 2022-06-20 RX ADMIN — ASPIRIN 81 MG: 81 TABLET, COATED ORAL at 08:09

## 2022-06-20 RX ADMIN — HYDRALAZINE HYDROCHLORIDE 15 MG: 20 INJECTION INTRAMUSCULAR; INTRAVENOUS at 09:52

## 2022-06-20 RX ADMIN — BUDESONIDE AND FORMOTEROL FUMARATE DIHYDRATE 2 PUFF: 160; 4.5 AEROSOL RESPIRATORY (INHALATION) at 08:09

## 2022-06-20 RX ADMIN — HYDRALAZINE HYDROCHLORIDE 15 MG: 20 INJECTION INTRAMUSCULAR; INTRAVENOUS at 17:31

## 2022-06-20 RX ADMIN — LOSARTAN POTASSIUM: 50 TABLET, FILM COATED ORAL at 08:09

## 2022-06-20 RX ADMIN — TICAGRELOR 90 MG: 90 TABLET ORAL at 08:09

## 2022-06-20 RX ADMIN — Medication 6 MG: at 21:03

## 2022-06-20 RX ADMIN — POTASSIUM CHLORIDE 40 MEQ: 1500 TABLET, EXTENDED RELEASE ORAL at 17:32

## 2022-06-20 RX ADMIN — TICAGRELOR 90 MG: 90 TABLET ORAL at 21:03

## 2022-06-20 RX ADMIN — CARVEDILOL 6.25 MG: 6.25 TABLET, FILM COATED ORAL at 08:09

## 2022-06-20 RX ADMIN — HYDRALAZINE HYDROCHLORIDE 15 MG: 20 INJECTION INTRAMUSCULAR; INTRAVENOUS at 05:40

## 2022-06-20 RX ADMIN — HYDRALAZINE HYDROCHLORIDE 10 MG: 20 INJECTION INTRAMUSCULAR; INTRAVENOUS at 16:37

## 2022-06-20 NOTE — UTILIZATION REVIEW
Continued Stay Review    Date: 06/18/2022                         Current Patient Class: IP Current Level of Care: Level 2 stepdown    HPI:67 y o  male initially admitted on 06/16/2022     Assessment/Plan:   OP Note:   SURGERY DATE: 6/18/2022  Procedure(s) (LRB):  ENDARTERECTOMY ARTERY CAROTID WITH PATCH ANGIOPLASTY (Left)  Anesthesia Type: General  Operative Findings:  Severe stenosis of left internal carotid artery extending from the carotid bifurcation to approximately 3 cm into the internal carotid artery  Plaque appeared ulcerated  Vital Signs:     06/18 Vascular Notes: POD 1 s/p L CEA: Pt reports minimal discomfort in the L neck  Requiring Cardene fro BP goals  continue aspirin and statin  Systolic BP goal: >140/ to <160/  wean cardene as able  prn pain meds  dvt ppx   On exam, L neck dressing cdi, dressing removed, minimal ecchymosis/swelling, minimally tender to palpation  tongue deviation to left (as expected due to retraction of hypoglossal nerve intra-operatively)      Date/Time Temp Pulse Resp BP MAP (mmHg) Arterial Line BP MAP SpO2 Calculated FIO2 (%) - Nasal Cannula Nasal Cannula O2 Flow Rate (L/min) O2 Device Patient Position - Orthostatic VS   06/18/22 22:24:43 98 °F (36 7 °C) 76 14 152/70 101 -- -- 97 % -- -- None (Room air) Lying   06/18/22 18:41:06 98 4 °F (36 9 °C) 87 14 165/75 108 -- -- 95 % -- -- None (Room air) Lying   06/18/22 1730 -- 88 -- 157/70 100 -- -- -- -- -- -- --   06/18/22 1700 -- 88 -- 144/65 94 -- -- -- -- -- -- --   06/18/22 1600 -- 82 -- 163/74 107 -- -- -- -- -- -- --   06/18/22 15:15:01 97 4 °F (36 3 °C) Abnormal  85 14 157/71 102 -- -- 97 % -- -- None (Room air) Lying   06/18/22 1430 -- 86 -- 156/71 102 -- -- -- -- -- -- --   06/18/22 1400 -- -- -- 156/71 102 -- -- -- -- -- -- --   06/18/22 1345 -- 72 14 149/70 100 158/56 88 mmHg 98 % 28 2 L/min Nasal cannula --   06/18/22 1330 97 7 °F (36 5 °C) 72 15 157/69 101 156/56 92 mmHg 98 % 28 2 L/min Nasal cannula --   06/18/22 1315 -- 72 14 160/71 101 164/58 92 mmHg 99 % 28 2 L/min Nasal cannula --   06/18/22 1300 -- 68 17 145/66 96 148/56 88 mmHg 99 % 28 2 L/min Nasal cannula --   06/18/22 1245 -- 70 17 151/68 97 156/56 88 mmHg 100 % 28 2 L/min Nasal cannula --   06/18/22 1230 -- 66 13 149/70 110 160/56 92 mmHg 99 % 28 2 L/min Nasal cannula --   06/18/22 1225 -- 64 14 -- -- 158/56 90 mmHg 99 % -- -- -- --   06/18/22 1215 -- 64 15 152/67 104 158/58 94 mmHg 100 % 32 3 L/min Nasal cannula --   06/18/22 1200 -- 66 18 140/67 95 148/62 94 mmHg 97 % 32 3 L/min Nasal cannula --   06/18/22 1150 -- -- 12 -- -- 152/63 -- -- -- -- -- --       Pertinent Labs/Diagnostic Results:   06/18 VAS US CEA intra op:   Impression:  Intra-op evaluation shows no evidence of mural thrombus, intimal flap or  significant residual disease in the endarterectomized carotid arteries  Results from last 7 days   Lab Units 06/20/22 0444 06/19/22  0520 06/17/22  0613 06/16/22  0638 06/15/22  2202   WBC Thousand/uL 13 14* 12 42* 8 23  --  7 03   HEMOGLOBIN g/dL 14 7 14 3 15 9  --  14 8   HEMATOCRIT % 44 1 42 0 48 6  --  46 1   PLATELETS Thousands/uL 201 191 205   < > 208   NEUTROS ABS Thousands/µL 10 91*  --  6 13  --  4 72    < > = values in this interval not displayed           Results from last 7 days   Lab Units 06/20/22  0444 06/19/22  0520 06/18/22  0459 06/17/22  2349 06/17/22  0613 06/15/22  2202   SODIUM mmol/L 138 140 139  --  139 140   POTASSIUM mmol/L 3 1* 3 5 3 5 3 9 2 9* 3 3*   CHLORIDE mmol/L 105 107 107  --  105 103   CO2 mmol/L 30 27 26  --  30 30   ANION GAP mmol/L 3* 6 6  --  4 7   BUN mg/dL 15 12 15  --  14 21   CREATININE mg/dL 0 83 0 86 1 10  --  0 98 1 44*   EGFR ml/min/1 73sq m 91 89 69  --  79 49   CALCIUM mg/dL 8 7 8 6 8 8  --  8 6 8 8   MAGNESIUM mg/dL  --   --   --   --  2 5  --      Results from last 7 days   Lab Units 06/18/22  0459 06/17/22  0613 06/15/22  2202   AST U/L 12 12 18   ALT U/L 19 19 27   ALK PHOS U/L 73 64 81   TOTAL PROTEIN g/dL 6 5 6 4 6 9   ALBUMIN g/dL 3 3* 3 3* 3 8   TOTAL BILIRUBIN mg/dL 1 71* 2 63* 1 53*   BILIRUBIN DIRECT mg/dL 0 38*  --   --      Results from last 7 days   Lab Units 06/18/22  1835 06/18/22  1215 06/15/22  2132   POC GLUCOSE mg/dl 202* 155* 159*     Results from last 7 days   Lab Units 06/20/22  0444 06/19/22  0520 06/18/22  0459 06/17/22  0613 06/15/22  2202   GLUCOSE RANDOM mg/dL 120 152* 125 99 162*         Results from last 7 days   Lab Units 06/16/22  0638 06/15/22  2202   HEMOGLOBIN A1C % 6 0* 6 1*   EAG mg/dl 126 128       Results from last 7 days   Lab Units 06/16/22  0229   HS TNI 0HR ng/L 6         Results from last 7 days   Lab Units 06/19/22  0520 06/15/22  2202   PROTIME seconds 12 9 12 3   INR  1 01 0 96   PTT seconds 30 33     Results from last 7 days   Lab Units 06/16/22  0853   TSH 3RD GENERATON uIU/mL 2 930       Results from last 7 days   Lab Units 06/15/22  2202   NT-PRO BNP pg/mL 166*     Medications:   Scheduled Medications:  aspirin, 81 mg, Oral, Daily  atorvastatin, 80 mg, Oral, QPM  budesonide-formoterol, 2 puff, Inhalation, BID  carvedilol, 12 5 mg, Oral, BID With Meals  heparin (porcine), 5,000 Units, Subcutaneous, Q8H [facility]  losartan potassium-hydrochlorothiazide (HYZAAR 50/12  5) combination, , Oral, Daily  melatonin, 6 mg, Oral, HS  potassium chloride, 40 mEq, Oral, BID  ticagrelor, 90 mg, Oral, Q12H [facility]      Continuous IV Infusions:  niCARdipine, 1-15 mg/hr, Intravenous, Continuous PRN      PRN Meds:  acetaminophen, 650 mg, Oral, Q4H PRN  hydrALAZINE, 10 mg, Intravenous, Q6H PRN  hydrALAZINE, 15 mg, Intravenous, Q15 Min PRN   And  niCARdipine, 1-15 mg/hr, Intravenous, Continuous PRN  ondansetron, 4 mg, Intravenous, Q6H PRN  oxyCODONE, 10 mg, Oral, Q4H PRN  oxyCODONE, 5 mg, Oral, Q4H PRN        Discharge Plan: TBD    Network Utilization Review Department  ATTENTION: Please call with any questions or concerns to [phone] and carefully listen to the prompts so that you are directed to the right person  All voicemails are confidential   [facility] all requests for admission clinical reviews, approved or denied determinations and any other requests to dedicated fax number below belonging to the campus where the patient is receiving treatment   List of dedicated fax numbers for the Facilities:  [address] DENIALS (Administrative/Medical Necessity) [phone]   [address] (Maternity/NICU/Pediatrics) [phone]   [address]  [address] [id number] [place] [address] [address] [facility] [place] [address] [phone]

## 2022-06-20 NOTE — PROGRESS NOTES
General Cardiology   Progress Note -  Team One   James Correa 79 y o  male MRN: [id number]    Unit/Bed#: [facility] 528-01 Encounter: [id number]    Assessment  1  Acute CVA, s/p left carotid endarterectomy (on 6/18/2022)  -MRI of the brain 6/17/22 - showed small multifocal acute infarcts in the left frontal and left occipital lobes  -CTA head and neck 6/15/22 - severe stenosis with essentially string sign of left proximal ICA  -On DAPT w/ aspirin, Brilinta, and high-intensity statin  2  Prior MI/ CAD s/p prior robotic CABG (LIMA to LAD-diag - in 4/2020) and prior PCI /SHERIF to LCX and distal RCA (2019)  -Recent stress echo 4/6/2022 - stress LVEF > 75%, no regional wall motion abnormalities   -On aspirin, statin, and BB  3  Essential hypertension  -Average /70, last recorded 159/74,    -Outpatient BP regimen; Coreg CR 40 mg daily, and losartan 50 mg daily   -On amlodipine 5 mg daily, carvedilol 6 25 mg b i d, and losartan/HCTZ 50-12 5 mg combo  4  Dyslipidemia   -Lipid profile 6/16/2022 - cholesterol 207, triglycerides 152, HDL 47, and     -On atorvastatin 80 mg daily]  5  Paroxysmal atrial fibrillation - (diagnosed in 2019, no documented recurrence)  -Maintaining NSR   -Not previously on anticoagulation  -On BB  Plan  -BP still remains moderately elevated  Would recommend increasing carvedilol to 12 5 mg b i d - (can switch back to Coreg CR 40 mg daily (home med), continue amlodipine, and losartan/HCTZ combo  -He remains on DAPT w/ aspirin/Brilinta and high-intensity statin   -Not previously on systemic anticoagulation reference to # 5 as he has had noted documented recurrence since 2019   It is not under reasonable to consider outpatient cardiac monitoring for AFib surveillance but this can be discussed with his outpatient cardiologist upon discharge   -He can follow-up with his regular outpatient Cardiologist at [facility]     Subjective  Review of Systems   Constitutional: Negative for chills, fever and malaise/fatigue  Eyes: Negative for visual disturbance  Cardiovascular: Negative for chest pain, dyspnea on exertion, leg swelling, orthopnea and palpitations  Respiratory: Negative for cough and shortness of breath  Gastrointestinal: Negative for abdominal pain  Neurological: Negative for dizziness, headaches and light-headedness  Objective:   Physical Exam  Vitals and nursing note reviewed  Constitutional:       General: He is not in acute distress  Appearance: He is not diaphoretic  HENT:      Head: Normocephalic and atraumatic  Mouth/Throat:      Mouth: Mucous membranes are moist    Eyes:      General: No scleral icterus  Cardiovascular:      Rate and Rhythm: Normal rate and regular rhythm  Pulses: Normal pulses  Heart sounds: Normal heart sounds  No murmur heard  Pulmonary:      Effort: Pulmonary effort is normal       Breath sounds: Normal breath sounds  No wheezing or rales  Abdominal:      Palpations: Abdomen is soft  Musculoskeletal:      Cervical back: Neck supple  Right lower leg: No edema  Left lower leg: No edema  Skin:     General: Skin is warm and dry  Capillary Refill: Capillary refill takes less than 2 seconds  Neurological:      General: No focal deficit present  Mental Status: He is alert and oriented to person, place, and time  Psychiatric:         Mood and Affect: Mood normal        Vitals: Blood pressure 159/74, pulse (!) 107, temperature 98 °F (36 7 °C), resp  rate (!) 34, height 6' 4" (1 93 m), weight 101 kg (223 lb), SpO2 97 %  ,     Body mass index is 27 14 kg/m²  ,   Systolic (00CAR), BAT:781 , Min:119 , FAC:619     Diastolic (09HKS), YTJ:94, Min:58, Max:77      Intake/Output Summary (Last 24 hours) at 6/20/2022 0857  Last data filed at 6/20/2022 0801  Gross per 24 hour   Intake 222 ml   Output 1380 ml   Net -1158 ml     Weight (last 2 days)     None          LABORATORY RESULTS      CBC with diff: Results from last 7 days   Lab Units 06/20/22 0444 06/19/22  0520 06/17/22  0613 06/16/22  0638 06/15/22  2202   WBC Thousand/uL 13 14* 12 42* 8 23  --  7 03   HEMOGLOBIN g/dL 14 7 14 3 15 9  --  14 8   HEMATOCRIT % 44 1 42 0 48 6  --  46 1   MCV fL 90 86 91  --  91   PLATELETS Thousands/uL 201 191 205 202 208   MCH pg 29 8 29 4 29 7  --  29 1   MCHC g/dL 33 3 34 0 32 7  --  32 1   RDW % 13 2 13 0 13 1  --  13 0   MPV fL 9 4 9 2 9 6 10 3 9 6   NRBC AUTO /100 WBCs 0  --  0  --  0       CMP:  Results from last 7 days   Lab Units 06/20/22 0444 06/19/22 0520 06/18/22 0459 06/17/22  2349 06/17/22  0613 06/15/22  2202   POTASSIUM mmol/L 3 1* 3 5 3 5 3 9 2 9* 3 3*   CHLORIDE mmol/L 105 107 107  --  105 103   CO2 mmol/L 30 27 26  --  30 30   BUN mg/dL 15 12 15  --  14 21   CREATININE mg/dL 0 83 0 86 1 10  --  0 98 1 44*   CALCIUM mg/dL 8 7 8 6 8 8  --  8 6 8 8   AST U/L  --   --  12  --  12 18   ALT U/L  --   --  19  --  19 27   ALK PHOS U/L  --   --  73  --  64 81   EGFR ml/min/1 73sq m 91 89 69  --  79 49       BMP:  Results from last 7 days   Lab Units 06/20/22 0444 06/19/22 0520 06/18/22 0459 06/17/22  2349 06/17/22  0613 06/15/22  2202   POTASSIUM mmol/L 3 1* 3 5 3 5 3 9 2 9* 3 3*   CHLORIDE mmol/L 105 107 107  --  105 103   CO2 mmol/L 30 27 26  --  30 30   BUN mg/dL 15 12 15  --  14 21   CREATININE mg/dL 0 83 0 86 1 10  --  0 98 1 44*   CALCIUM mg/dL 8 7 8 6 8 8  --  8 6 8 8       Lab Results   Component Value Date    NTBNP 166 (H) 06/15/2022        Results from last 7 days   Lab Units 06/17/22  0613   MAGNESIUM mg/dL 2 5       Results from last 7 days   Lab Units 06/16/22  0638 06/15/22  2202   HEMOGLOBIN A1C % 6 0* 6 1*       Results from last 7 days   Lab Units 06/16/22  0853   TSH 3RD GENERATON uIU/mL 2 930       Results from last 7 days   Lab Units 06/19/22  0520 06/15/22  2202   INR  1 01 0 96       Lipid Profile:   No results found for: CHOL  Lab Results   Component Value Date    HDL 47 06/16/2022 HDL 47 06/16/2022    HDL 42 06/15/2022     Lab Results   Component Value Date    LDLCALC 130 (H) 06/16/2022    LDLCALC 136 (H) 06/16/2022    LDLCALC 123 (H) 06/15/2022     Lab Results   Component Value Date    TRIG 152 (H) 06/16/2022    TRIG 142 06/16/2022    TRIG 230 (H) 06/15/2022       Cardiac testing:   No results found for this or any previous visit  No results found for this or any previous visit  No results found for this or any previous visit  No valid procedures specified  No results found for this or any previous visit  Meds/Allergies   all current active meds have been reviewed and current meds:   Current Facility-Administered Medications   Medication Dose Route Frequency    acetaminophen (TYLENOL) tablet 650 mg  650 mg Oral Q4H PRN    amLODIPine (NORVASC) tablet 5 mg  5 mg Oral Daily    aspirin (ECOTRIN LOW STRENGTH) EC tablet 81 mg  81 mg Oral Daily    atorvastatin (LIPITOR) tablet 80 mg  80 mg Oral QPM    budesonide-formoterol (SYMBICORT) 160-4 5 mcg/act inhaler 2 puff  2 puff Inhalation BID    carvedilol (COREG) tablet 6 25 mg  6 25 mg Oral BID With Meals    heparin (porcine) subcutaneous injection 5,000 Units  5,000 Units Subcutaneous Q8H [facility]    hydrALAZINE (APRESOLINE) injection 10 mg  10 mg Intravenous Q6H PRN    hydrALAZINE (APRESOLINE) injection 15 mg  15 mg Intravenous Q15 Min PRN    And    niCARdipine (CARDENE) 25 mg (STANDARD CONCENTRATION) in sodium chloride 0 9% 250 mL  1-15 mg/hr Intravenous Continuous PRN    losartan potassium-hydrochlorothiazide (HYZAAR 50/12  5) combination   Oral Daily    melatonin tablet 6 mg  6 mg Oral HS    ondansetron (ZOFRAN) injection 4 mg  4 mg Intravenous Q6H PRN    oxyCODONE (ROXICODONE) IR tablet 10 mg  10 mg Oral Q4H PRN    oxyCODONE (ROXICODONE) IR tablet 5 mg  5 mg Oral Q4H PRN    potassium chloride (K-DUR,KLOR-CON) CR tablet 40 mEq  40 mEq Oral BID    ticagrelor (BRILINTA) tablet 90 mg  90 mg Oral Q12H [facility]     niCARdipine, 1-15 mg/hr, Last Rate: Stopped (06/20/22 0001)      EKG personally reviewed by KISHOR Higgins    Assessment:  Principal Problem:    Acute CVA (cerebrovascular accident) [facility])  Active Problems:    S/P CABG (coronary artery bypass graft)    Mixed hyperlipidemia    Coronary artery disease involving native coronary artery of native heart without angina pectoris    Essential hypertension    Carotid artery stenosis    Thyroid nodule    Atrial fibrillation (HCC)    Elevated serum creatinine    Hypokalemia    Counseling / Coordination of Care  Total floor / unit time spent today 20 minutes  Greater than 50% of total time was spent with the patient and / or family counseling and / or coordination of care  ** Please Note: Dragon 360 Dictation voice to text software may have been used in the creation of this document   **

## 2022-06-20 NOTE — DISCHARGE INSTR - OTHER ORDERS
Incisional care:  Wash incision daily w/ soap and water  Pat dry thoroughly  Allow skin glue to slough

## 2022-06-20 NOTE — PROGRESS NOTES
[address] [facility]  Progress Note - Waqar Bertrand 1955, 79 y o  male MRN: [id number]  Unit/Bed#: [facility] 528-01 Encounter: [id number]  Primary Care Provider: No primary care provider on file  Date and time admitted to hospital: 6/16/2022  5:43 AM    Carotid artery stenosis  Assessment & Plan  Assessment:  67M  with Afib (not on anticoagulation), HTN, HLD, history of MI (s/p DESx2 followed by CABG x1 4/2020), history of TIA presenting [facility] ED with right arm paresthesias and slurred speech, now s/p L CEA on 6/18      CTA head and neck reviewed demonstrating severe stenosis with essentially string sign of left proximal ICA  It appears that there some flow through this tight lesion,  Carotid duplex: There is 70-99% stenosis noted in the internal carotid artery  Plaque is heterogenous and irregular  MRI brain: sm multifocal acute infarct in L frontal & L occipital lobes  Chr lacunar infarcts in L caudate head & L thalamus w/ mild chr microangiopathy  R nasal cavity polypoid lesions  TTE: LVEF 62%      Plan:  Continue neurovascular checks  Appreciate cards recommendations for BP control; now off cardene gtt  Dual antiplatelet therapy/statin  Encourage OOB and ambulation  DC jasmyn  Possible dc later today vs tomorrow if BP remains controlled              Subjective/Objective       Subjective: No acute events overnight  Feeling tired today  Objective:     Blood pressure 151/66, pulse 74, temperature 97 6 °F (36 4 °C), temperature source Oral, resp  rate (!) 34, height 6' 4" (1 93 m), weight 101 kg (223 lb), SpO2 96 %  ,Body mass index is 27 14 kg/m²        Intake/Output Summary (Last 24 hours) at 6/20/2022 0700  Last data filed at 6/20/2022 0401  Gross per 24 hour   Intake 222 ml   Output 980 ml   Net -758 ml       Invasive Devices  Report    Peripheral Intravenous Line  Duration           Peripheral IV 06/17/22 Left;Ventral (anterior) Forearm 2 days    Peripheral IV 06/18/22 Left Forearm 1 day          Arterial Line  Duration           Arterial Line 06/18/22 1 day                Physical Exam  Vitals reviewed  Constitutional:       General: He is not in acute distress  Appearance: He is not ill-appearing, toxic-appearing or diaphoretic  HENT:      Head: Normocephalic and atraumatic  Nose: Nose normal       Mouth/Throat:      Mouth: Mucous membranes are moist    Neck:      Comments: Incision c/d/i  Cardiovascular:      Rate and Rhythm: Normal rate  Pulmonary:      Effort: Pulmonary effort is normal  No respiratory distress  Neurological:      Mental Status: He is alert and oriented to person, place, and time  Mental status is at baseline  Comments: Tongue deviates to the left  Moving upper and lower ext     Psychiatric:         Mood and Affect: Mood normal          Behavior: Behavior normal            Lab, Imaging and other studies:CBC:   Lab Results   Component Value Date    WBC 13 14 (H) 06/20/2022    HGB 14 7 06/20/2022    HCT 44 1 06/20/2022    MCV 90 06/20/2022     06/20/2022    MCH 29 8 06/20/2022    MCHC 33 3 06/20/2022    RDW 13 2 06/20/2022    MPV 9 4 06/20/2022    NRBC 0 06/20/2022   , CMP:   Lab Results   Component Value Date    SODIUM 138 06/20/2022    K 3 1 (L) 06/20/2022     06/20/2022    CO2 30 06/20/2022    BUN 15 06/20/2022    CREATININE 0 83 06/20/2022    CALCIUM 8 7 06/20/2022    EGFR 91 06/20/2022   , Coagulation: No results found for: PT, INR, APTT  VTE Pharmacologic Prophylaxis: Heparin  VTE Mechanical Prophylaxis: sequential compression device

## 2022-06-20 NOTE — CASE MANAGEMENT
Case Management Discharge Planning Note    Patient name Branden Born  Location [address] [place] [address] [address]18 MRN [id number]  : 1955 Date 2022       Current Admission Date: 2022  Current Admission Diagnosis:Acute CVA (cerebrovascular accident) [facility])   Patient Active Problem List    Diagnosis Date Noted    Leukocytosis 2022    Hypokalemia 2022    Stroke ([facility] [address] ) 2022    Acute CVA (cerebrovascular accident) ([facility] ) 2022    Carotid artery stenosis 2022    Thyroid nodule 2022    Atrial fibrillation ([facility] ) 2022    Elevated serum creatinine 2022    Chest pain 2021    S/P CABG (coronary artery bypass graft)     Uncomplicated asthma     Mixed hyperlipidemia 12/10/2020    Coronary artery disease involving native coronary artery of native heart without angina pectoris 12/10/2020    Essential hypertension 12/10/2020      LOS (days): 4  Geometric Mean LOS (GMLOS) (days): 1 20  Days to GMLOS:-3 3     OBJECTIVE:  Risk of Unplanned Readmission Score: 10 78         Current admission status: Inpatient   Preferred Pharmacy:   [facility] ([place], [place]) [address]  [address] [address]  Phone: [phone] Fax: [phone]    Primary Care Provider: No primary care provider on file  Primary Insurance: [facility]  Secondary Insurance: [facility]    DISCHARGE DETAILS:    Per [facility] provider, Dr Lawton Goldmann, pt requires continued inpatient stay due to hypertension following left carotid endarterectomy  No CM needs identified by JOSE shepard but CM will be available if needed

## 2022-06-20 NOTE — ASSESSMENT & PLAN NOTE
CTA head and neck severe stenosis left proximal ICA  Continue aspirin Brilinta statin  Status post carotid endarterectomy with vascular surgery    Postoperative day 2  Postop management per vascular surgery-stable from vascular surgery standpoint

## 2022-06-20 NOTE — PROGRESS NOTES
Vascular Nurse Navigator Post Op Education    Met with patient at bedside to introduce myself as Vascular Nurse Navigator and explained my role  Patient is appropriate and accepting to education  Patient was educated with Review of written materials provided, Teachback, Explanation, Demonstration and Question & Answer on expectations of post op care and recovery on Left CEA  Patient is a non-smoker  Education provided to patient on infection prevention, activity limitations, when to call the office, importance of follow up, and incisional care  Discharge instruction handout provided to patient to review

## 2022-06-20 NOTE — PROGRESS NOTES
[address] [facility]  Progress Note - Aurelia Hernandez 1955, 79 y o  male MRN: [id number]  Unit/Bed#: [facility] 523-01 Encounter: [id number]  Primary Care Provider: No primary care provider on file  Date and time admitted to hospital: 6/16/2022  5:43 AM    * Acute CVA (cerebrovascular accident) [facility])  Assessment & Plan  MRI brain imaging reveals multifocal small infarcts left frontal and left occipital lobes  Imaging studies revealed severe left proximal internal carotid artery stenosis  Continue aspirin Brilinta statin  Neurology input noted      Hypokalemia  Assessment & Plan  Replete  Monitor    Elevated serum creatinine  Assessment & Plan  Suspect some element of CKD, no prior labs in our system  · Monitor kidney function closely  · Avoid hypotension  · Monitor postvoid residuals    Atrial fibrillation [facility])  Assessment & Plan  Cardiology notes reviewed -  History of PAF post MI and spontaneously converted to sinus rhythm  Presently not on anticoagulation  Cardiology input noted plans for outpatient loop recorder noted      Thyroid nodule  Assessment & Plan  Noted on CTA H&N  · Outpatient thyroid U/S recommended     Carotid artery stenosis  Assessment & Plan  CTA head and neck severe stenosis left proximal ICA  Continue aspirin Brilinta statin  Status post carotid endarterectomy with vascular surgery  Postoperative day 2  Postop management per vascular surgery-stable from vascular surgery standpoint    Essential hypertension  Assessment & Plan  Hypertensive urgency on admission  Blood pressure is now improving  Monitor blood pressures  Avoid hypotension  Goal  blood pressure systolics less than 516   Increasing the dose of Coreg and added hydrochlorothiazide by Cardiology today    Coronary artery disease involving native coronary artery of native heart without angina pectoris  Assessment & Plan  CAD - s/p SHERIF lt circ 2019, SHERIF OM1 and distal RCA with robotic CABG- LIMA to LAD-diagonal 2020  · Continue aspirin statin Brilinta beta-blocker  · Cardiology input noted    Mixed hyperlipidemia  Assessment & Plan  · Continue atorvastatin    S/P CABG (coronary artery bypass graft)  Assessment & Plan  Continue aspirin statin beta-blocker    VTE Pharmacologic Prophylaxis:   Pharmacologic: Heparin  Mechanical VTE Prophylaxis in Place: Yes    Patient Centered Rounds: I have performed bedside rounds with nursing staff today  Discussions with Specialists or Other Care Team Provider:     Education and Discussions with Family / Patient:  Patient    Time Spent for Care: 30 minutes  More than 50% of total time spent on counseling and coordination of care as described above  Current Length of Stay: 4 day(s)    Current Patient Status: Inpatient   Certification Statement: The patient will continue to require additional inpatient hospital stay due to Blood pressure control    Discharge Plan:     Code Status: Level 1 - Full Code      Subjective:   Patient seen and examined  No events overnight  Discussed with vascular surgery stable from vascular surgery standpoint for discharge  But blood pressure is not adequately controlled  Cardiology on board and adjusting medications    Objective:     Vitals:   Temp (24hrs), Av 7 °F (36 5 °C), Min:97 3 °F (36 3 °C), Max:98 1 °F (36 7 °C)    Temp:  [97 3 °F (36 3 °C)-98 1 °F (36 7 °C)] 98 1 °F (36 7 °C)  HR:  [] 100  Resp:  [16-34] 16  BP: (119-180)/(58-81) 178/81  SpO2:  [96 %-98 %] 98 %  Body mass index is 27 14 kg/m²  Input and Output Summary (last 24 hours): Intake/Output Summary (Last 24 hours) at 2022 1601  Last data filed at 2022 1401  Gross per 24 hour   Intake 222 ml   Output 2030 ml   Net -1808 ml       Physical Exam:     Physical Exam  Constitutional:       General: He is not in acute distress  HENT:      Head: Normocephalic and atraumatic  Nose: Nose normal    Eyes:      General: No scleral icterus    Neck: Comments: Surgical site C/D/I  Cardiovascular:      Rate and Rhythm: Normal rate and regular rhythm  Pulses: Normal pulses  Heart sounds: Normal heart sounds  Pulmonary:      Effort: Pulmonary effort is normal  No respiratory distress  Breath sounds: Normal breath sounds  Abdominal:      General: Abdomen is flat  There is no distension  Musculoskeletal:         General: Normal range of motion  Skin:     General: Skin is warm  Coloration: Skin is not jaundiced  Neurological:      Mental Status: He is alert and oriented to person, place, and time  Cranial Nerves: No cranial nerve deficit  Additional Data:     Labs:    Results from last 7 days   Lab Units 06/20/22  0444   WBC Thousand/uL 13 14*   HEMOGLOBIN g/dL 14 7   HEMATOCRIT % 44 1   PLATELETS Thousands/uL 201   NEUTROS PCT % 82*   LYMPHS PCT % 9*   MONOS PCT % 7   EOS PCT % 1     Results from last 7 days   Lab Units 06/20/22  0444 06/19/22  0520 06/18/22  0459   POTASSIUM mmol/L 3 1*   < > 3 5   CHLORIDE mmol/L 105   < > 107   CO2 mmol/L 30   < > 26   BUN mg/dL 15   < > 15   CREATININE mg/dL 0 83   < > 1 10   CALCIUM mg/dL 8 7   < > 8 8   ALK PHOS U/L  --   --  73   ALT U/L  --   --  19   AST U/L  --   --  12    < > = values in this interval not displayed  Results from last 7 days   Lab Units 06/19/22  0520   INR  1 01       * I Have Reviewed All Lab Data Listed Above  * Additional Pertinent Lab Tests Reviewed:  Benji [facility] Admission Reviewed    Imaging:    Imaging Reports Reviewed Today Include:   Imaging Personally Reviewed by Myself Includes:      Recent Cultures (last 7 days):           Last 24 Hours Medication List:   Current Facility-Administered Medications   Medication Dose Route Frequency Provider Last Rate    acetaminophen  650 mg Oral Q4H PRN Mert Byrd DO      aspirin  81 mg Oral Daily Mert Byrd DO      atorvastatin  80 mg Oral QPM DO Yobany Hernandez budesonide-formoterol  2 puff Inhalation BID Kathee Remedies, DO      carvedilol  12 5 mg Oral BID With Meals KISHOR Hebert      heparin (porcine)  5,000 Units Subcutaneous [facility] Kathee Remedies, DO      hydrALAZINE  10 mg Intravenous Q6H PRN Kathee Remedies, DO      hydrALAZINE  15 mg Intravenous Q15 Min PRN Kathee Remedies, DO      And    niCARdipine  1-15 mg/hr Intravenous Continuous PRN Kathee Remedies, DO Stopped (06/20/22 0001)    losartan potassium-hydrochlorothiazide (HYZAAR 50/12  5) combination   Oral Daily Kathee Remedies, DO      melatonin  6 mg Oral HS Kathee Remedies, DO      ondansetron  4 mg Intravenous Q6H PRN Kathee Remedies, DO      oxyCODONE  10 mg Oral Q4H PRN Kathee Remedies, DO      oxyCODONE  5 mg Oral Q4H PRN Kathee Remedies, DO      potassium chloride  40 mEq Oral BID Lorraine Dutton MD      ticagrelor  90 mg Oral Q12H [facility] Kathee Remedies, DO          Today, Patient Was Seen By: Darien Sterling MD    ** Please Note: Dictation voice to text software may have been used in the creation of this document   **

## 2022-06-20 NOTE — PLAN OF CARE
Problem: Nutrition/Hydration-ADULT  Goal: Nutrient/Hydration intake appropriate for improving, restoring or maintaining nutritional needs  Description: Monitor and assess patient's nutrition/hydration status for malnutrition  Collaborate with interdisciplinary team and initiate plan and interventions as ordered  Monitor patient's weight and dietary intake as ordered or per policy  Utilize nutrition screening tool and intervene as necessary  Determine patient's food preferences and provide high-protein, high-caloric foods as appropriate       INTERVENTIONS:  - Monitor oral intake, urinary output, labs, and treatment plans  - Assess nutrition and hydration status and recommend course of action  - Evaluate amount of meals eaten  - Assist patient with eating if necessary   - Allow adequate time for meals  - Recommend/ encourage appropriate diets, oral nutritional supplements, and vitamin/mineral supplements  - Order, calculate, and assess calorie counts as needed  - Recommend, monitor, and adjust tube feedings and TPN/PPN based on assessed needs  - Assess need for intravenous fluids  - Provide specific nutrition/hydration education as appropriate  - Include patient/family/caregiver in decisions related to nutrition  Outcome: Progressing     Problem: PAIN - ADULT  Goal: Verbalizes/displays adequate comfort level or baseline comfort level  Description: Interventions:  - Encourage patient to monitor pain and request assistance  - Assess pain using appropriate pain scale  - Administer analgesics based on type and severity of pain and evaluate response  - Implement non-pharmacological measures as appropriate and evaluate response  - Consider cultural and social influences on pain and pain management  - Notify physician/advanced practitioner if interventions unsuccessful or patient reports new pain  Outcome: Progressing     Problem: INFECTION - ADULT  Goal: Absence or prevention of progression during hospitalization  Description: INTERVENTIONS:  - Assess and monitor for signs and symptoms of infection  - Monitor lab/diagnostic results  - Monitor all insertion sites, i e  indwelling lines, tubes, and drains  - Monitor endotracheal if appropriate and nasal secretions for changes in amount and color  - [place] appropriate cooling/warming therapies per order  - Administer medications as ordered  - Instruct and encourage patient and family to use good hand hygiene technique  - Identify and instruct in appropriate isolation precautions for identified infection/condition  Outcome: Progressing  Goal: Absence of fever/infection during neutropenic period  Description: INTERVENTIONS:  - Monitor WBC    Outcome: Progressing     Problem: SAFETY ADULT  Goal: Patient will remain free of falls  Description: INTERVENTIONS:  - Educate patient/family on patient safety including physical limitations  - Instruct patient to call for assistance with activity   - Consult OT/PT to assist with strengthening/mobility   - Keep Call bell within reach  - Keep bed low and locked with side rails adjusted as appropriate  - Keep care items and personal belongings within reach  - Initiate and maintain comfort rounds  - Make Fall Risk Sign visible to staff  - Offer Toileting every   Hours, in advance of need  - Initiate/Maintain  alarm  - Obtain necessary fall risk management equipment:    - Apply yellow socks and bracelet for high fall risk patients  - Consider moving patient to room near nurses station  Outcome: Progressing  Goal: Maintain or return to baseline ADL function  Description: INTERVENTIONS:  -  Assess patient's ability to carry out ADLs; assess patient's baseline for ADL function and identify physical deficits which impact ability to perform ADLs (bathing, care of mouth/teeth, toileting, grooming, dressing, etc )  - Assess/evaluate cause of self-care deficits   - Assess range of motion  - Assess patient's mobility; develop plan if impaired  - Assess patient's need for assistive devices and provide as appropriate  - Encourage maximum independence but intervene and supervise when necessary  - Involve family in performance of ADLs  - Assess for home care needs following discharge   - Consider OT consult to assist with ADL evaluation and planning for discharge  - Provide patient education as appropriate  Outcome: Progressing  Goal: Maintains/Returns to pre admission functional level  Description: INTERVENTIONS:  - Perform BMAT or MOVE assessment daily    - Set and communicate daily mobility goal to care team and patient/family/caregiver  - Collaborate with rehabilitation services on mobility goals if consulted  - Perform Range of Motion   times a day  - Reposition patient every   hours    - Dangle patient   times a day  - Stand patient   times a day  - Ambulate patient   times a day  - Out of bed to chair   times a day   - Out of bed for meals  times a day  - Out of bed for toileting  - Record patient progress and toleration of activity level   Outcome: Progressing     Problem: DISCHARGE PLANNING  Goal: Discharge to home or other facility with appropriate resources  Description: INTERVENTIONS:  - Identify barriers to discharge w/patient and caregiver  - Arrange for needed discharge resources and transportation as appropriate  - Identify discharge learning needs (meds, wound care, etc )  - Arrange for interpretive services to assist at discharge as needed  - Refer to Case Management Department for coordinating discharge planning if the patient needs post-hospital services based on physician/advanced practitioner order or complex needs related to functional status, cognitive ability, or social support system  Outcome: Progressing     Problem: Knowledge Deficit  Goal: Patient/family/caregiver demonstrates understanding of disease process, treatment plan, medications, and discharge instructions  Description: Complete learning assessment and assess knowledge base    Interventions:  - Provide teaching at level of understanding  - Provide teaching via preferred learning methods  Outcome: Progressing     Problem: Potential for Falls  Goal: Patient will remain free of falls  Description: INTERVENTIONS:  - Educate patient/family on patient safety including physical limitations  - Instruct patient to call for assistance with activity   - Consult OT/PT to assist with strengthening/mobility   - Keep Call bell within reach  - Keep bed low and locked with side rails adjusted as appropriate  - Keep care items and personal belongings within reach  - Initiate and maintain comfort rounds  - Make Fall Risk Sign visible to staff  - Offer Toileting every   Hours, in advance of need  - Initiate/Maintain  alarm  - Obtain necessary fall risk management equipment:    - Apply yellow socks and bracelet for high fall risk patients  - Consider moving patient to room near nurses station  Outcome: Progressing

## 2022-06-20 NOTE — RESTORATIVE TECHNICIAN NOTE
Restorative Technician Note      Patient Name: Ina Lemos     Note Type: Mobility  Patient Position Upon Consult: Standing  Activity Performed: Ambulated  Patient Position at End of Consult: Bedside chair;  All needs within reach

## 2022-06-20 NOTE — ASSESSMENT & PLAN NOTE
Hypertensive urgency on admission  Blood pressure is now improving  Monitor blood pressures  Avoid hypotension  Goal  blood pressure systolics less than 073   Increasing the dose of Coreg and added hydrochlorothiazide by Cardiology today

## 2022-06-20 NOTE — PROGRESS NOTES
Progress Note - Neurology   Leonela Dolan 79 y o  male MRN: [id number]  Unit/Bed#: [facility] 528-01 Encounter: [id number]      Assessment/Plan     * Acute CVA (cerebrovascular accident) [facility])  Assessment & Plan  79year old male with HTN, HLD, CAD s/p CABG, PAF presented to [place] on 6/15/2022 with transient neurologic deficits  He had two isolated episodes of neurologic symptoms: The first episode manifested as heaviness/numbness of the RLE, lasting about 1 hour  The second episode consisted of total R arm numbness, dysarthria, and word finding difficulty, lasting for 15 min  BP on arrival 220/11  CTH noted age indeterminate infarcts of the left caudate and left thalamus  CTA noted significant athersclerotic disease of the left proximal ICA  MRI lynsey ultimately revealed multifocal infarctions in the left frontal and left occipital lobes, within the L MCA territory  Suspect symptomatic L ICA stenosis  Patient underwent L CEA on 6/18/2022  Plan:  - Continue DAPT with ASA 81 mg QD, Brilinta 90 mg Q12H    - Continue on atorvastatin 80 mg QPM   - Telemetry  - Post-op care as per vascular surgery team   - PT/OT/ST as needed  - Medical management and supportive care per primary team  Correction of any metabolic or infectious disturbances  Carotid artery stenosis  Assessment & Plan  - s/p L CEA on 6/18/2022  Essential hypertension  Assessment & Plan  - SBP goal <140    - Management as per medicine and cardiology teams  Atrial fibrillation [facility])  Assessment & Plan  - Diagnosed in 2019 and no documented recurrence  - Follow-up with outpatient cardiologist      Hypokalemia  [address] [facility] as per medicine team     Thyroid nodule  Assessment & Plan  - Outpatient thyroid ultrasound as per medicine team         Leonela Dolan will need follow up in in 6 weeks with neurovascular attending or advance practitioner  He will not require outpatient neurological testing  Subjective:    Today, patient states he is doing well after his carotid endarterectomy on Saturday  The patient has some weakness of the lower left arm, this is related to the two IVs removed this morning  Patient complains of tongue weakness, pain, and difficulty chewing/swallowing  Patient was told from vascular surgery that his tongue needed to be repositioned for intubation before the surgery and that was the reason for this pain and discomfort  Patient's right foot/ankle numbness he had on Friday has subsided  The patient has not experienced any other bouts of right-sided numbness, word-finding difficulty, or slurred speech  No headaches, no seizures, and no new neurological deficits were reported today       ROS:  Review of Systems - History obtained from the patient  General ROS: negative for - chills, fatigue or fever  Ophthalmic ROS: negative for - blurry vision, decreased vision or double vision  Respiratory ROS: negative for - shortness of breath  Cardiovascular ROS: negative for - chest pain  Gastrointestinal ROS: negative for - abdominal pain or nausea/vomiting  Musculoskeletal ROS: negative for - gait disturbance or muscular weakness  Neurological ROS: negative for - confusion, dizziness, gait disturbance, headaches, impaired coordination/balance, numbness/tingling, speech problems, visual changes or weakness    Medications  Scheduled Meds:  Current Facility-Administered Medications   Medication Dose Route Frequency Provider Last Rate    acetaminophen  650 mg Oral Q4H PRN Verdonalde Dengs, DO      aspirin  81 mg Oral Daily Verjames Marcos, DO      atorvastatin  80 mg Oral QPM Verjames Marcos, DO      budesonide-formoterol  2 puff Inhalation BID Verjames Marcos, DO      carvedilol  12 5 mg Oral BID With Meals KISHOR Parrish      heparin (porcine)  5,000 Units Subcutaneous [facility] Verdie Priti, DO      hydrALAZINE  10 mg Intravenous Q6H PRN Verdonalde Priti, DO      hydrALAZINE  15 mg Intravenous Q15 Min PRN Jamar Ortiz Fidelia , DO      And    niCARdipine  1-15 mg/hr Intravenous Continuous PRN Particia Javed, DO Stopped (06/20/22 0001)    losartan potassium-hydrochlorothiazide (HYZAAR 50/12  5) combination   Oral Daily Particia Javed, DO      melatonin  6 mg Oral HS Particia Javed, DO      ondansetron  4 mg Intravenous Q6H PRN Particia Javed, DO      oxyCODONE  10 mg Oral Q4H PRN Particia Javed, DO      oxyCODONE  5 mg Oral Q4H PRN Particia Javed, DO      potassium chloride  40 mEq Oral BID Grant Rodriguez MD      ticagrelor  90 mg Oral Q12H [address] Particia Javed, DO       Continuous Infusions:niCARdipine, 1-15 mg/hr, Last Rate: Stopped (06/20/22 0001)      PRN Meds:   acetaminophen    hydrALAZINE    [COMPLETED] labetalol **AND** hydrALAZINE **AND** niCARdipine    ondansetron    oxyCODONE    oxyCODONE    Vitals: Blood pressure 159/74, pulse (!) 107, temperature 98 °F (36 7 °C), resp  rate (!) 34, height 6' 4" (1 93 m), weight 101 kg (223 lb), SpO2 97 %  ,Body mass index is 27 14 kg/m²  Physical Exam: Neurologic Exam     Mental Status   Oriented to person, place, and time  Oriented to city and area  Oriented to year, month and date  Attention: normal  Concentration: normal    Speech: speech is normal   Level of consciousness: alert  Knowledge: good  Able to name object  Able to read  Cranial Nerves     CN II   Visual fields full to confrontation  Visual acuity: normal  Right visual field deficit: none  Left visual field deficit: none     CN III, IV, VI   Pupils are equal, round, and reactive to light  Extraocular motions are normal    Right pupil: Accommodation: intact  Left pupil: Accommodation: intact  Nystagmus: none   Diplopia: none  Ophthalmoparesis: none  Upgaze: normal  Downgaze: normal  Conjugate gaze: present    CN V   Facial sensation intact  CN VII   Facial expression full, symmetric       CN VIII   CN VIII normal      CN XI   CN XI normal      CN XII   Tongue: atrophic (Mild tongue atrophy/displacement)  Tongue deviation: left    Motor Exam   Muscle bulk: normal  Overall muscle tone: normal  Right arm tone: normal  Left arm tone: normal  Right arm pronator drift: absent  Left arm pronator drift: absent  Right leg tone: normal  Left leg tone: normal    Strength   Left strength: Abnormal left upper extremity motor exam, decreased movementMotor strength 5/5 B/L UE and LE       Sensory Exam   Light touch normal      Gait, Coordination, and Reflexes     Gait  Gait: (Not assessed, limited mobility)    Coordination   Finger to nose coordination: normal    Tremor   Resting tremor: absent  Intention tremor: absent  Action tremor: absent      Labs  Recent Results (from the past 24 hour(s))   CBC and differential    Collection Time: 06/20/22  4:44 AM   Result Value Ref Range    WBC 13 14 (H) 4 31 - 10 16 Thousand/uL    RBC 4 93 3 88 - 5 62 Million/uL    Hemoglobin 14 7 12 0 - 17 0 g/dL    Hematocrit 44 1 36 5 - 49 3 %    MCV 90 82 - 98 fL    MCH 29 8 26 8 - 34 3 pg    MCHC 33 3 31 4 - 37 4 g/dL    RDW 13 2 11 6 - 15 1 %    MPV 9 4 8 9 - 12 7 fL    Platelets 441 742 - 024 Thousands/uL    nRBC 0 /100 WBCs    Neutrophils Relative 82 (H) 43 - 75 %    Immat GRANS % 1 0 - 2 %    Lymphocytes Relative 9 (L) 14 - 44 %    Monocytes Relative 7 4 - 12 %    Eosinophils Relative 1 0 - 6 %    Basophils Relative 0 0 - 1 %    Neutrophils Absolute 10 91 (H) 1 85 - 7 62 Thousands/µL    Immature Grans Absolute 0 06 0 00 - 0 20 Thousand/uL    Lymphocytes Absolute 1 16 0 60 - 4 47 Thousands/µL    Monocytes Absolute 0 92 0 17 - 1 22 Thousand/µL    Eosinophils Absolute 0 06 0 00 - 0 61 Thousand/µL    Basophils Absolute 0 03 0 00 - 0 10 Thousands/µL   Basic metabolic panel    Collection Time: 06/20/22  4:44 AM   Result Value Ref Range    Sodium 138 136 - 145 mmol/L    Potassium 3 1 (L) 3 5 - 5 3 mmol/L    Chloride 105 100 - 108 mmol/L    CO2 30 21 - 32 mmol/L    ANION GAP 3 (L) 4 - 13 mmol/L    BUN 15 5 - 25 mg/dL    Creatinine 0  83 0 60 - 1 30 mg/dL    Glucose 120 65 - 140 mg/dL    Calcium 8 7 8 3 - 10 1 mg/dL    eGFR 91 ml/min/1 73sq m     Imaging   No new neuro imaging available for review  VTE Prophylaxis: Heparin    Counseling / Coordination of Care  Total time spent today 34 minutes  Greater than 50% of total time was spent with the patient and / or family counseling and / or coordination of care  A description of the counseling / coordination of care: Time spent reviewing plan of care at bedside with patient  Patient will need follow-up with neurovascular team in 6-8 weeks  Recommend continue on dual antiplatelet as well as statin  Encouraged patient to follow regularly with PCP and ensure BP control  Patient expressed understanding

## 2022-06-20 NOTE — QUICK NOTE
As of of [phone]: Spoke to Monica So  Will keep SLIM as consultants  Optimistically to be weaned off of BP gtt in the next 24-48 hours  If anything were to escalate CC happy to assist          As of 2044: Responded to RN concern about double concentration Cardene as well as need for further BP control post operatively  Spoke to Dr Collins Schrader over the phone and he reported post operatively if L1SD Vascular Surgery manages as primary  I did adjust attendings to reflect that to Dr Fanny Obrien per Dr Collins Schrader request      In regards to BP control given A-line and drip highly recommend that primary reach out to CC for further assistance  SLIM management also confirmed that SLIM only follows when post op L1SD acuity lower and typically CC follows in the SD units for patients of this nature  Will consult CC medicine for further assistance with BP control  SLIM will be signing off until patient more appropriate for the floor/med-surg         [facility], [address] [place] [address] Large Joint Aspiration/Injection: R subacromial bursa  Date/Time: 12/16/2019 1:00 PM  Performed by: Jelani Stacy MD  Authorized by: Jelani Stacy MD     Consent Done?:  Yes (Verbal)  Indications:  Pain  Anesthesia    Anesthetic: lidocaine 2% without epinephrine    Location:  Shoulder  Site:  R subacromial bursa  Medications:  40 mg triamcinolone acetonide 40 mg/mL; 2 mg dexamethasone 4 mg/mL  Patient tolerance:  Patient tolerated the procedure well with no immediate complications     Injected 1 cc Kenalog 1 cc dexamethasone right subacromial bursa

## 2022-06-21 ENCOUNTER — TELEPHONE (OUTPATIENT)
Dept: NEUROLOGY | Facility: CLINIC | Age: 67
End: 2022-06-21

## 2022-06-21 VITALS
OXYGEN SATURATION: 96 % | BODY MASS INDEX: 27.16 KG/M2 | DIASTOLIC BLOOD PRESSURE: 56 MMHG | RESPIRATION RATE: 17 BRPM | HEART RATE: 68 BPM | WEIGHT: 223 LBS | SYSTOLIC BLOOD PRESSURE: 128 MMHG | HEIGHT: 76 IN | TEMPERATURE: 98.8 F

## 2022-06-21 LAB
ANION GAP SERPL CALCULATED.3IONS-SCNC: 6 MMOL/L (ref 4–13)
BUN SERPL-MCNC: 24 MG/DL (ref 5–25)
CALCIUM SERPL-MCNC: 9.2 MG/DL (ref 8.3–10.1)
CHLORIDE SERPL-SCNC: 104 MMOL/L (ref 100–108)
CO2 SERPL-SCNC: 27 MMOL/L (ref 21–32)
CREAT SERPL-MCNC: 1.06 MG/DL (ref 0.6–1.3)
ERYTHROCYTE [DISTWIDTH] IN BLOOD BY AUTOMATED COUNT: 13.7 % (ref 11.6–15.1)
GFR SERPL CREATININE-BSD FRML MDRD: 72 ML/MIN/1.73SQ M
GLUCOSE SERPL-MCNC: 124 MG/DL (ref 65–140)
HCT VFR BLD AUTO: 45.1 % (ref 36.5–49.3)
HGB BLD-MCNC: 15.1 G/DL (ref 12–17)
MAGNESIUM SERPL-MCNC: 2.2 MG/DL (ref 1.6–2.6)
MCH RBC QN AUTO: 29.5 PG (ref 26.8–34.3)
MCHC RBC AUTO-ENTMCNC: 33.5 G/DL (ref 31.4–37.4)
MCV RBC AUTO: 88 FL (ref 82–98)
PLATELET # BLD AUTO: 232 THOUSANDS/UL (ref 149–390)
PMV BLD AUTO: 9.3 FL (ref 8.9–12.7)
POTASSIUM SERPL-SCNC: 3.4 MMOL/L (ref 3.5–5.3)
RBC # BLD AUTO: 5.12 MILLION/UL (ref 3.88–5.62)
SODIUM SERPL-SCNC: 137 MMOL/L (ref 136–145)
WBC # BLD AUTO: 12.18 THOUSAND/UL (ref 4.31–10.16)

## 2022-06-21 PROCEDURE — 83735 ASSAY OF MAGNESIUM: CPT | Performed by: SURGERY

## 2022-06-21 PROCEDURE — 80048 BASIC METABOLIC PNL TOTAL CA: CPT | Performed by: SURGERY

## 2022-06-21 PROCEDURE — 85027 COMPLETE CBC AUTOMATED: CPT | Performed by: SURGERY

## 2022-06-21 PROCEDURE — 99232 SBSQ HOSP IP/OBS MODERATE 35: CPT | Performed by: INTERNAL MEDICINE

## 2022-06-21 PROCEDURE — 99239 HOSP IP/OBS DSCHRG MGMT >30: CPT | Performed by: INTERNAL MEDICINE

## 2022-06-21 RX ORDER — LOSARTAN POTASSIUM AND HYDROCHLOROTHIAZIDE 12.5; 5 MG/1; MG/1
1 TABLET ORAL DAILY
Qty: 30 TABLET | Refills: 0 | Status: SHIPPED | OUTPATIENT
Start: 2022-06-21 | End: 2022-07-28 | Stop reason: SDUPTHER

## 2022-06-21 RX ORDER — ATORVASTATIN CALCIUM 80 MG/1
80 TABLET, FILM COATED ORAL EVERY EVENING
Qty: 30 TABLET | Refills: 0 | Status: SHIPPED | OUTPATIENT
Start: 2022-06-21 | End: 2022-07-28 | Stop reason: SDUPTHER

## 2022-06-21 RX ADMIN — ASPIRIN 81 MG: 81 TABLET, COATED ORAL at 09:11

## 2022-06-21 RX ADMIN — HYDRALAZINE HYDROCHLORIDE 10 MG: 20 INJECTION INTRAMUSCULAR; INTRAVENOUS at 06:20

## 2022-06-21 RX ADMIN — BUDESONIDE AND FORMOTEROL FUMARATE DIHYDRATE 2 PUFF: 160; 4.5 AEROSOL RESPIRATORY (INHALATION) at 09:12

## 2022-06-21 RX ADMIN — CARVEDILOL 12.5 MG: 12.5 TABLET, FILM COATED ORAL at 06:30

## 2022-06-21 RX ADMIN — LOSARTAN POTASSIUM: 50 TABLET, FILM COATED ORAL at 09:11

## 2022-06-21 RX ADMIN — TICAGRELOR 90 MG: 90 TABLET ORAL at 09:12

## 2022-06-21 NOTE — ASSESSMENT & PLAN NOTE
Hypertensive urgency on admission  Blood pressure is now improving  Monitor blood pressures  Avoid hypotension  Goal  blood pressure systolics less than 429   Increasing the dose of Coreg and added hydrochlorothiazide by Cardiology

## 2022-06-21 NOTE — RESTORATIVE TECHNICIAN NOTE
Restorative Technician Note      Patient Name: Laine Costa     Note Type: Mobility  Patient Position Upon Consult: Supine  Activity Performed: Ambulated  Patient Position at End of Consult: Bedside chair;  All needs within reach

## 2022-06-21 NOTE — CASE MANAGEMENT
Case Management Discharge Planning Note    Patient name Roula Corley  Location [address] [facility]/[facility] 306-57 MRN [id number]  : 1955 Date 2022       Current Admission Date: 2022  Current Admission Diagnosis:Acute CVA (cerebrovascular accident) [facility])   Patient Active Problem List    Diagnosis Date Noted    Leukocytosis 2022    Hypokalemia 2022    Stroke ([facility] ) 2022    [facility] ([facility] ) 2022    Carotid artery stenosis 2022    Thyroid nodule 2022    [facility]    Elevated serum creatinine 2022    Chest pain 2021    S/P CABG (coronary artery bypass graft)     Uncomplicated asthma     Mixed hyperlipidemia 12/10/2020    Coronary artery disease involving native coronary artery of native heart without angina pectoris 12/10/2020    Essential hypertension 12/10/2020      LOS (days): 5  Geometric Mean LOS (GMLOS) (days): 1 20  Days to GMLOS:-4 2     OBJECTIVE:  Risk of Unplanned Readmission Score: 11 17         Current admission status: Inpatient   Preferred Pharmacy:   [facility] [address] [place] [address]  Phone: [phone] Fax: [phone]    Primary Care Provider: No primary care provider on file  Primary Insurance: Theamelia Guzman REP  [place] [facility] [place]    DISCHARGE DETAILS:                                          Other Referral/Resources/Interventions Provided:  Interventions: Transportation to treatment, Prescription Price Check  Referral Comments: Brilinta at [address] [facility] per Tonya Bokatherine will be zero out of pocket cost  met with pt and made aware  Pt requested transport to home as family/friends are unabel to transport today  Pt going home to [phone] and pt's cell number for LYFT is [phone]   Pt states someone is at home to let him in/ Pt agreeable to terms of LYDEL and signed the waiver and it was sent down to medical records           Treatment Team Recommendation: Home  Discharge Destination Plan[de-identified] Home  Transport at Discharge : Free Local Transportation  Dispatcher Contacted: Yes  Number/Name of Dispatcher: [facility]TS  Transported by [employer] and Unit #): LAURA  ETA of Transport (Date): 06/21/22  ETA of Transport (Time): 6237

## 2022-06-21 NOTE — PROGRESS NOTES
General Cardiology   Progress Note -  Team One   Dash Hernandez 79 y o  male MRN: [id number]    Unit/Bed#: [facility] 523-01 Encounter: [id number]    Assessment  1  Acute CVA, s/p left carotid endarterectomy (on 6/18/2022)  -MRI of the brain 6/17/22 - showed small multifocal acute infarcts in the left frontal and left occipital lobes  -CTA head and neck 6/15/22 - severe stenosis with essentially string sign of left proximal ICA  -On DAPT w/ aspirin, Brilinta, and high-intensity statin  2  Prior MI/ CAD s/p prior robotic CABG (LIMA to LAD-diag - in 4/2020) and prior PCI /SHERIF to LCX and distal RCA (2019)  -Recent stress echo 4/6/2022 - stress LVEF > 75%, no regional wall motion abnormalities   -On aspirin, statin, and BB  3  Essential hypertension  -Average /73, last recorded 128/56, HR 68    -Outpatient BP regimen; Coreg CR 40 mg daily, and losartan 50 mg daily   -On amlodipine 5 mg daily, carvedilol 6 25 mg b i d, and losartan/HCTZ 50-12 5 mg combo  4  Dyslipidemia   -Lipid profile 6/16/2022 - cholesterol 207, triglycerides 152, HDL 47, and     -On atorvastatin 80 mg daily]  5  Paroxysmal atrial fibrillation - (diagnosed in 2019, no documented recurrence)  -Maintaining NSR   -Not previously on anticoagulation  -On BB      Plan  -Improved BP control over the past 12 hrs, some intermittent hypertensive episodes but last 2 readings were well controlled  Continue Coreg 12 5 mg BID, and losartan/HCTZ combo; amlodipine appears to have been discontinued last evening  Would recommend further intensifying losartan/HCTZ if needed for improved BP control but at this time would continue current dose - further adjustments can be made as an outpatient    -Continue DAPT w/ aspirin/Brilinta and high-intensity statin   -Not previously on systemic anticoagulation reference to # 5 as he has had noted documented recurrence since 2019   It is not unreasonable to consider outpatient cardiac monitoring for AFib surveillance but this can be discussed with his outpatient cardiologist upon discharge   -He can follow-up with his regular outpatient Cardiologist at [facility]      Subjective  Review of Systems   Constitutional: Negative for chills, fever and malaise/fatigue  Eyes: Negative for visual disturbance  Cardiovascular: Negative for chest pain, dyspnea on exertion, leg swelling, orthopnea and palpitations  Respiratory: Negative for cough and shortness of breath  Gastrointestinal: Negative for abdominal pain  Neurological: Negative for dizziness, headaches and light-headedness  Objective:   Physical Exam  Vitals and nursing note reviewed  Constitutional:       General: He is not in acute distress  Appearance: He is not diaphoretic  HENT:      Head: Normocephalic and atraumatic  Eyes:      General: No scleral icterus  Cardiovascular:      Rate and Rhythm: Normal rate and regular rhythm  Pulses: Normal pulses  Heart sounds: Normal heart sounds  No murmur heard  Pulmonary:      Effort: Pulmonary effort is normal       Breath sounds: Normal breath sounds  No wheezing or rales  Abdominal:      Palpations: Abdomen is soft  Musculoskeletal:      Cervical back: Neck supple  Right lower leg: No edema  Left lower leg: No edema  Skin:     General: Skin is warm and dry  Capillary Refill: Capillary refill takes less than 2 seconds  Neurological:      General: No focal deficit present  Mental Status: He is alert and oriented to person, place, and time  Psychiatric:         Mood and Affect: Mood normal          Vitals: Blood pressure 128/56, pulse 68, temperature 97 7 °F (36 5 °C), temperature source Oral, resp  rate 18, height 6' 4" (1 93 m), weight 101 kg (223 lb), SpO2 96 %  ,     Body mass index is 27 14 kg/m²  ,   Systolic (31KLO), JGK:191 , Min:119 , FXO:486     Diastolic (58TCV), MOD:81, Min:56, Max:85      Intake/Output Summary (Last 24 hours) at 6/21/2022 1041  Last data filed at 6/21/2022 0845  Gross per 24 hour   Intake 840 ml   Output 1406 ml   Net -566 ml     Weight (last 2 days)     None          LABORATORY RESULTS      CBC with diff:   Results from last 7 days   Lab Units 06/21/22 0450 06/20/22 0444 06/19/22  0520 06/17/22  0613 06/16/22  0638 06/15/22  2202   WBC Thousand/uL 12 18* 13 14* 12 42* 8 23  --  7 03   HEMOGLOBIN g/dL 15 1 14 7 14 3 15 9  --  14 8   HEMATOCRIT % 45 1 44 1 42 0 48 6  --  46 1   MCV fL 88 90 86 91  --  91   PLATELETS Thousands/uL 232 201 191 205 202 208   MCH pg 29 5 29 8 29 4 29 7  --  29 1   MCHC g/dL 33 5 33 3 34 0 32 7  --  32 1   RDW % 13 7 13 2 13 0 13 1  --  13 0   MPV fL 9 3 9 4 9 2 9 6 10 3 9 6   NRBC AUTO /100 WBCs  --  0  --  0  --  0       CMP:  Results from last 7 days   Lab Units 06/21/22 0450 06/20/22 0444 06/19/22 0520 06/18/22 0459 06/17/22  2349 06/17/22  0613 06/15/22  2202   POTASSIUM mmol/L 3 4* 3 1* 3 5 3 5 3 9 2 9* 3 3*   CHLORIDE mmol/L 104 105 107 107  --  105 103   CO2 mmol/L 27 30 27 26  --  30 30   BUN mg/dL 24 15 12 15  --  14 21   CREATININE mg/dL 1 06 0 83 0 86 1 10  --  0 98 1 44*   CALCIUM mg/dL 9 2 8 7 8 6 8 8  --  8 6 8 8   AST U/L  --   --   --  12  --  12 18   ALT U/L  --   --   --  19  --  19 27   ALK PHOS U/L  --   --   --  73  --  64 81   EGFR ml/min/1 73sq m 72 91 89 69  --  79 49       BMP:  Results from last 7 days   Lab Units 06/21/22 0450 06/20/22 0444 06/19/22  0520 06/18/22 0459 06/17/22  2349 06/17/22  0613 06/15/22  2202   POTASSIUM mmol/L 3 4* 3 1* 3 5 3 5 3 9 2 9* 3 3*   CHLORIDE mmol/L 104 105 107 107  --  105 103   CO2 mmol/L 27 30 27 26  --  30 30   BUN mg/dL 24 15 12 15  --  14 21   CREATININE mg/dL 1 06 0 83 0 86 1 10  --  0 98 1 44*   CALCIUM mg/dL 9 2 8 7 8 6 8 8  --  8 6 8 8       Lab Results   Component Value Date    NTBNP 166 (H) 06/15/2022        Results from last 7 days   Lab Units 06/21/22  0450 06/17/22  0613   MAGNESIUM mg/dL 2 2 2 5       Results from last 7 days   Lab Units 06/16/22  0638 06/15/22  2202   HEMOGLOBIN A1C % 6 0* 6 1*       Results from last 7 days   Lab Units 06/16/22  0853   TSH 3RD GENERATON uIU/mL 2 930       Results from last 7 days   Lab Units 06/19/22  0520 06/15/22  2202   INR  1 01 0 96       Lipid Profile:   No results found for: CHOL  Lab Results   Component Value Date    HDL 47 06/16/2022    HDL 47 06/16/2022    HDL 42 06/15/2022     Lab Results   Component Value Date    LDLCALC 130 (H) 06/16/2022    LDLCALC 136 (H) 06/16/2022    LDLCALC 123 (H) 06/15/2022     Lab Results   Component Value Date    TRIG 152 (H) 06/16/2022    TRIG 142 06/16/2022    TRIG 230 (H) 06/15/2022       Cardiac testing:   No results found for this or any previous visit  No results found for this or any previous visit  No results found for this or any previous visit  No valid procedures specified  No results found for this or any previous visit  Meds/Allergies   all current active meds have been reviewed and current meds:   Current Facility-Administered Medications   Medication Dose Route Frequency    acetaminophen (TYLENOL) tablet 650 mg  650 mg Oral Q4H PRN    aspirin (ECOTRIN LOW STRENGTH) EC tablet 81 mg  81 mg Oral Daily    atorvastatin (LIPITOR) tablet 80 mg  80 mg Oral QPM    budesonide-formoterol (SYMBICORT) 160-4 5 mcg/act inhaler 2 puff  2 puff Inhalation BID    carvedilol (COREG) tablet 12 5 mg  12 5 mg Oral BID With Meals    heparin (porcine) subcutaneous injection 5,000 Units  5,000 Units Subcutaneous Q8H [facility]    hydrALAZINE (APRESOLINE) injection 10 mg  10 mg Intravenous Q6H PRN    hydrALAZINE (APRESOLINE) injection 15 mg  15 mg Intravenous Q15 Min PRN    And    niCARdipine (CARDENE) 25 mg (STANDARD CONCENTRATION) in sodium chloride 0 9% 250 mL  1-15 mg/hr Intravenous Continuous PRN    losartan potassium-hydrochlorothiazide (HYZAAR 50/12  5) combination   Oral Daily    melatonin tablet 6 mg  6 mg Oral HS    ondansetron (ZOFRAN) injection 4 mg 4 mg Intravenous Q6H PRN    oxyCODONE (ROXICODONE) immediate release tablet 10 mg  10 mg Oral Q4H PRN    oxyCODONE (ROXICODONE) IR tablet 5 mg  5 mg Oral Q4H PRN    ticagrelor (BRILINTA) tablet 90 mg  90 mg Oral Q12H [facility]     niCARdipine, 1-15 mg/hr, Last Rate: Stopped (06/20/22 0001)      EKG personally reviewed by KISHOR Hoang    Assessment:  Principal Problem:    Acute CVA (cerebrovascular accident) [facility])  Active Problems:    S/P CABG (coronary artery bypass graft)    Mixed hyperlipidemia    Coronary artery disease involving native coronary artery of native heart without angina pectoris    Essential hypertension    Carotid artery stenosis    Thyroid nodule    Atrial fibrillation (HCC)    Elevated serum creatinine    Hypokalemia    Leukocytosis    Counseling / Coordination of Care  Total floor / unit time spent today 20 minutes  Greater than 50% of total time was spent with the patient and / or family counseling and / or coordination of care  ** Please Note: Dragon 360 Dictation voice to text software may have been used in the creation of this document   **

## 2022-06-21 NOTE — DISCHARGE SUMMARY
[address] [facility]  Discharge- Laine Costa 1955, 79 y o  male MRN: [id number]  Unit/Bed#: [facility] 523-01 Encounter: [id number]  Primary Care Provider: No primary care provider on file  Date and time admitted to hospital: 6/16/2022  5:43 AM    * Acute CVA (cerebrovascular accident) [facility])  Assessment & Plan  MRI brain imaging reveals multifocal small infarcts left frontal and left occipital lobes  Imaging studies revealed severe left proximal internal carotid artery stenosis  Continue aspirin Brilinta statin  Neurology input noted      Carotid artery stenosis  Assessment & Plan  CTA head and neck severe stenosis left proximal ICA  Continue aspirin Brilinta statin  Status post carotid endarterectomy with vascular surgery  Postop management per vascular surgery-stable from vascular surgery standpoint    Leukocytosis  Assessment & Plan  · Monitor  · Likely reactive    Hypokalemia  Assessment & Plan  Replete  Monitor    Atrial fibrillation [facility])  Assessment & Plan  Cardiology notes reviewed -  History of PAF post MI and spontaneously converted to sinus rhythm  Presently not on anticoagulation  Cardiology input noted plans for outpatient loop recorder noted      Thyroid nodule  Assessment & Plan  Noted on CTA H&N  · Outpatient thyroid U/S recommended     Essential hypertension  Assessment & Plan  Hypertensive urgency on admission  Blood pressure is now improving  Monitor blood pressures  Avoid hypotension  Goal  blood pressure systolics less than 894   Increasing the dose of Coreg and added hydrochlorothiazide by Cardiology     Coronary artery disease involving native coronary artery of native heart without angina pectoris  Assessment & Plan  CAD - s/p SHERIF lt circ 2019, SHERIF OM1 and distal RCA with robotic CABG- LIMA to LAD-diagonal 4/2020  · Continue aspirin statin Brilinta beta-blocker  · Cardiology input noted    Mixed hyperlipidemia  Assessment & Plan  · Continue atorvastatin    S/P CABG (coronary artery bypass graft)  Assessment & Plan  Continue aspirin statin beta-blocker          Discharge Summary - [facility] Internal Medicine    Patient Information: Luis Angel Cagle 79 y o  male MRN: [id number]  Unit/Bed#: [facility] 523-01 Encounter: [id number]    Discharging Physician / Practitioner: Newton Galan MD  PCP: No primary care provider on file  Admission Date: 6/16/2022  Discharge Date: 06/21/22    Disposition:     Home    Reason for Admission: tia TIA TIA    Discharge Diagnoses:     Principal Problem:    Acute CVA (cerebrovascular accident) [facility])  Active Problems:    Carotid artery stenosis    S/P CABG (coronary artery bypass graft)    Mixed hyperlipidemia    Coronary artery disease involving native coronary artery of native heart without angina pectoris    Essential hypertension    Thyroid nodule    Atrial fibrillation (HCC)    Elevated serum creatinine    Hypokalemia    Leukocytosis  Resolved Problems:    * No resolved hospital problems  *      Consultations During Hospital Stay:  · Cardiology  · Vascular surgery  · Neurology    Procedures Performed:     · MRI brain  Small multifocal acute infarcts in left frontal and left occipital lobes      Chronic lacunar infarcts in left caudate head and left thalamus with mild chronic microangiopathy  · Endarterectomy carotid artery      Hospital Course:     Luis Angel Cagle is a 79 y o  male patient who originally presented to the hospital on 6/16/2022 due to TIA  Patient with history of coronary disease drug-eluting stent placement hypertension dyslipidemia transferred to [facility] for [address] for management of several TIAs  He was placed on stroke pathway imaging studies revealed carotid artery disease  He was evaluated Neurology Cardiology and vascular surgery  He has undergone carotid endarterectomy  He has been placed on Brilinta aspirin and statin    He is symptomatically improved hemodynamically stable and is deemed ready for discharge today  Follow-up with Cardiology for loop recorder for AFib evaluation  Patient Cardiology, vascular surgery, Neurology follow-up recommended  Condition at Discharge: fair     Discharge Day Visit / Exam:     Subjective:      Sitting up in chair  Reports feeling better  Agreeable to discharge plan    Vitals: Blood Pressure: 128/56 (06/21/22 0900)  Pulse: 68 (06/21/22 0900)  Temperature: 97 7 °F (36 5 °C) (06/21/22 0738)  Temp Source: Oral (06/21/22 0738)  Respirations: 18 (06/21/22 0738)  Height: 6' 4" (193 cm) (06/16/22 1301)  Weight - Scale: 101 kg (223 lb) (06/16/22 1301)  SpO2: 96 % (06/21/22 0738)  Exam:   Physical Exam    Sitting up in chair  Mucous members are moist  Lungs diminished breath sounds bilaterally  Heart sounds S1 and S2 noted  Abdomen soft  Awake obeys simple commands  No pedal edema  No rash    Discharge instructions/Information to patient and family:   See after visit summary for information provided to patient and family  Discharge plan discussed the patient, reports he is keeping his family updated  Outpatient follow-up with Cardiology, vascular surgery, Neurology, primary care physician    Provisions for Follow-Up Care:  See after visit summary for information related to follow-up care and any pertinent home health orders  Planned Readmission: no     Discharge Statement:  I spent 45 minutes discharging the patient  This time was spent on the day of discharge  I had direct contact with the patient on the day of discharge  Greater than 50% of the total time was spent examining patient, answering all patient questions, arranging and discussing plan of care with patient as well as directly providing post-discharge instructions  Additional time then spent on discharge activities  Discharge Medications:  See after visit summary for reconciled discharge medications provided to patient and family        ** Please Note: This note has been constructed using a voice recognition system **

## 2022-06-21 NOTE — TELEPHONE ENCOUNTER
Per hospital notes         María Gibbons will need follow up in in 6 weeks with neurovascular attending or advance practitioner  He will not require outpatient neurological testing        Pt has been schd with ap

## 2022-06-21 NOTE — ASSESSMENT & PLAN NOTE
CTA head and neck severe stenosis left proximal ICA  Continue aspirin Brilinta statin  Status post carotid endarterectomy with vascular surgery    Postop management per vascular surgery-stable from vascular surgery standpoint

## 2022-06-22 ENCOUNTER — HOSPITAL ENCOUNTER (EMERGENCY)
Facility: HOSPITAL | Age: 67
Discharge: HOME | End: 2022-06-22
Attending: EMERGENCY MEDICINE | Admitting: EMERGENCY MEDICINE
Payer: COMMERCIAL

## 2022-06-22 VITALS
SYSTOLIC BLOOD PRESSURE: 169 MMHG | RESPIRATION RATE: 20 BRPM | HEART RATE: 81 BPM | DIASTOLIC BLOOD PRESSURE: 79 MMHG | OXYGEN SATURATION: 98 % | TEMPERATURE: 97.2 F

## 2022-06-22 DIAGNOSIS — I10 HIGH BLOOD PRESSURE: Primary | ICD-10-CM

## 2022-06-22 DIAGNOSIS — I25.10 CORONARY ARTERY DISEASE INVOLVING NATIVE CORONARY ARTERY OF NATIVE HEART WITHOUT ANGINA PECTORIS: ICD-10-CM

## 2022-06-22 DIAGNOSIS — I16.0 HYPERTENSIVE URGENCY: ICD-10-CM

## 2022-06-22 LAB
2HR DELTA HS TROPONIN: -2 NG/L
ANION GAP SERPL CALCULATED.3IONS-SCNC: 9 MMOL/L (ref 4–13)
BASOPHILS # BLD AUTO: 0.04 THOUSANDS/ΜL (ref 0–0.1)
BASOPHILS NFR BLD AUTO: 0 % (ref 0–1)
BUN SERPL-MCNC: 28 MG/DL (ref 5–25)
CALCIUM SERPL-MCNC: 9.1 MG/DL (ref 8.3–10.1)
CARDIAC TROPONIN I PNL SERPL HS: 19 NG/L
CARDIAC TROPONIN I PNL SERPL HS: 21 NG/L
CHLORIDE SERPL-SCNC: 100 MMOL/L (ref 100–108)
CO2 SERPL-SCNC: 30 MMOL/L (ref 21–32)
CREAT SERPL-MCNC: 1.26 MG/DL (ref 0.6–1.3)
EOSINOPHIL # BLD AUTO: 0.18 THOUSAND/ΜL (ref 0–0.61)
EOSINOPHIL NFR BLD AUTO: 2 % (ref 0–6)
ERYTHROCYTE [DISTWIDTH] IN BLOOD BY AUTOMATED COUNT: 13.2 % (ref 11.6–15.1)
GFR SERPL CREATININE-BSD FRML MDRD: 58 ML/MIN/1.73SQ M
GLUCOSE SERPL-MCNC: 125 MG/DL (ref 65–140)
HCT VFR BLD AUTO: 45.6 % (ref 36.5–49.3)
HGB BLD-MCNC: 15.3 G/DL (ref 12–17)
IMM GRANULOCYTES # BLD AUTO: 0.04 THOUSAND/UL (ref 0–0.2)
IMM GRANULOCYTES NFR BLD AUTO: 0 % (ref 0–2)
LYMPHOCYTES # BLD AUTO: 1.38 THOUSANDS/ΜL (ref 0.6–4.47)
LYMPHOCYTES NFR BLD AUTO: 14 % (ref 14–44)
MCH RBC QN AUTO: 29.3 PG (ref 26.8–34.3)
MCHC RBC AUTO-ENTMCNC: 33.6 G/DL (ref 31.4–37.4)
MCV RBC AUTO: 87 FL (ref 82–98)
MONOCYTES # BLD AUTO: 0.83 THOUSAND/ΜL (ref 0.17–1.22)
MONOCYTES NFR BLD AUTO: 8 % (ref 4–12)
NEUTROPHILS # BLD AUTO: 7.76 THOUSANDS/ΜL (ref 1.85–7.62)
NEUTS SEG NFR BLD AUTO: 76 % (ref 43–75)
NRBC BLD AUTO-RTO: 0 /100 WBCS
PLATELET # BLD AUTO: 249 THOUSANDS/UL (ref 149–390)
PMV BLD AUTO: 9.4 FL (ref 8.9–12.7)
POTASSIUM SERPL-SCNC: 3.2 MMOL/L (ref 3.5–5.3)
RBC # BLD AUTO: 5.23 MILLION/UL (ref 3.88–5.62)
SODIUM SERPL-SCNC: 139 MMOL/L (ref 136–145)
WBC # BLD AUTO: 10.23 THOUSAND/UL (ref 4.31–10.16)

## 2022-06-22 PROCEDURE — 96375 TX/PRO/DX INJ NEW DRUG ADDON: CPT

## 2022-06-22 PROCEDURE — 96374 THER/PROPH/DIAG INJ IV PUSH: CPT

## 2022-06-22 PROCEDURE — 93005 ELECTROCARDIOGRAM TRACING: CPT

## 2022-06-22 PROCEDURE — 99283 EMERGENCY DEPT VISIT LOW MDM: CPT

## 2022-06-22 PROCEDURE — 36415 COLL VENOUS BLD VENIPUNCTURE: CPT | Performed by: EMERGENCY MEDICINE

## 2022-06-22 PROCEDURE — 80048 BASIC METABOLIC PNL TOTAL CA: CPT | Performed by: EMERGENCY MEDICINE

## 2022-06-22 PROCEDURE — 84484 ASSAY OF TROPONIN QUANT: CPT | Performed by: EMERGENCY MEDICINE

## 2022-06-22 PROCEDURE — 99291 CRITICAL CARE FIRST HOUR: CPT | Performed by: EMERGENCY MEDICINE

## 2022-06-22 PROCEDURE — 85025 COMPLETE CBC W/AUTO DIFF WBC: CPT | Performed by: EMERGENCY MEDICINE

## 2022-06-22 PROCEDURE — 96376 TX/PRO/DX INJ SAME DRUG ADON: CPT

## 2022-06-22 RX ORDER — LABETALOL HYDROCHLORIDE 5 MG/ML
10 INJECTION, SOLUTION INTRAVENOUS ONCE
Status: COMPLETED | OUTPATIENT
Start: 2022-06-22 | End: 2022-06-22

## 2022-06-22 RX ORDER — LABETALOL HYDROCHLORIDE 5 MG/ML
40 INJECTION, SOLUTION INTRAVENOUS ONCE
Status: COMPLETED | OUTPATIENT
Start: 2022-06-22 | End: 2022-06-22

## 2022-06-22 RX ORDER — HYDRALAZINE HYDROCHLORIDE 20 MG/ML
10 INJECTION INTRAMUSCULAR; INTRAVENOUS ONCE
Status: COMPLETED | OUTPATIENT
Start: 2022-06-22 | End: 2022-06-22

## 2022-06-22 RX ORDER — HYDRALAZINE HYDROCHLORIDE 20 MG/ML
20 INJECTION INTRAMUSCULAR; INTRAVENOUS ONCE
Status: COMPLETED | OUTPATIENT
Start: 2022-06-22 | End: 2022-06-22

## 2022-06-22 RX ORDER — NITROGLYCERIN 0.4 MG/1
0.4 TABLET SUBLINGUAL
Qty: 25 TABLET | Refills: 1 | Status: SHIPPED | OUTPATIENT
Start: 2022-06-22

## 2022-06-22 RX ADMIN — LABETALOL 20 MG/4 ML (5 MG/ML) INTRAVENOUS SYRINGE 10 MG: at 10:14

## 2022-06-22 RX ADMIN — HYDRALAZINE HYDROCHLORIDE 10 MG: 20 INJECTION INTRAMUSCULAR; INTRAVENOUS at 12:20

## 2022-06-22 RX ADMIN — HYDRALAZINE HYDROCHLORIDE 20 MG: 20 INJECTION, SOLUTION INTRAMUSCULAR; INTRAVENOUS at 14:23

## 2022-06-22 RX ADMIN — LABETALOL HYDROCHLORIDE 40 MG: 5 INJECTION, SOLUTION INTRAVENOUS at 11:09

## 2022-06-22 NOTE — UTILIZATION REVIEW
Notification of Discharge   This is a Notification of Discharge from our facility [address]  Please be advised that this patient has been discharge from our facility  Below you will find the admission and discharge date and time including the patients disposition  UTILIZATION REVIEW CONTACT:  Matilde Hoover  Utilization   Network Utilization Review Department  Phone: [phone] x carefully listen to the prompts  All voicemails are confidential   Email: [email or web address]     PHYSICIAN ADVISORY SERVICES:  FOR [id number] REVIEW - MEDICAL NECESSITY DENIAL  Phone: [phone]  Fax: [phone]  Email: [email or web address]     PRESENTATION DATE: 6/16/2022  5:43 AM  OBERVATION ADMISSION DATE:   INPATIENT ADMISSION DATE: 6/16/22  5:43 AM   DISCHARGE DATE: 6/21/2022  2:48 PM  DISPOSITION: Home/Self Care Home/Self Care      IMPORTANT INFORMATION:  Send all requests for admission clinical reviews, approved or denied determinations and any other requests to dedicated fax number below belonging to the campus where the patient is receiving treatment   List of dedicated fax numbers:  [address] DENIALS (Administrative/Medical Necessity) [phone]   [address] (Maternity/NICU/Pediatrics) [phone]   [facility] [phone]   [address] [place] [address] [phone]   [address] [place] [address] [phone]   [address] [facility] [address] [place] [address],[address] [phone]   [facility]  [phone]   [address] [facility], [address]  [address] [address] [address] [facility] [phone]

## 2022-06-22 NOTE — CASE MANAGEMENT
Case Management Progress Note    Patient name Olga Giraldo [id number] MRN [id number]  : 1955 Date 2022       LOS (days): 0  Geometric Mean LOS (GMLOS) (days):   Days to GMLOS:        OBJECTIVE:        Current admission status: Emergency  Preferred Pharmacy:   [facility] [place] [facility]  [address] [id number]  Phone: [phone] Fax: [phone]    Primary Care Provider: No primary care provider on file  Primary Insurance: [address] [place] [address],[address] REP  Secondary Insurance: [facility]    PROGRESS NOTE:  CM responded to Code R alert  RECENT ADMISSION: Pt was discharged from B 22   AGE: 79  SEX: M  DX: CVA , Carotid stenosis , Carotid Endartectomy   OUTCOME: Pt went home with his wife   RESOURCES ON D/C Pt went home with wife with out patient follow up   CURRENT F/U APPTS: 22 Vascular, 22 Cardiology , 22 Endocrine and 22 Neuro  *REASON FOR READMISSION: Pt came to the ED with complaints of hypertension   *INTERVENTIONS: Currently run labs on patient        *Mandatory field

## 2022-06-22 NOTE — ED PROVIDER NOTES
History  Chief Complaint   Patient presents with    Hypertension     Pt states yesterday had sx on carotid artery, today w/ BP in 605M systolic  Is on BP medication but could not find meds at home  Patient 3 days status post left carotid endarterectomy surgery  He had hospital course complicated with elevated blood pressures  He is prescribed Coreg and Hyzaar  He states was unable take the Coreg as a misplaced the prescription bottle  Denies any complaints except feeling slightly foggy in his head but denies any headaches, visual changes, focal motor sensory neurological deficits difficulty speaking or concentrating  However he took his blood pressure at home and the systolic was over 312 and called his physician who instructed him to proceed to the emergency department  He is accompanied by his spouse  Prior to Admission Medications   Prescriptions Last Dose Informant Patient Reported? Taking? Symbicort 160-4 5 MCG/ACT inhaler   No No   Sig: Inhale 2 puffs 2 (two) times a day Rinse mouth after use     albuterol (PROVENTIL HFA,VENTOLIN HFA) 90 mcg/act inhaler   No No   Sig: Inhale 2 puffs every 6 (six) hours as needed for wheezing   aspirin (ECOTRIN LOW STRENGTH) 81 mg EC tablet   Yes No   Sig: Take 81 mg by mouth daily   atorvastatin (LIPITOR) 80 mg tablet   No No   Sig: Take 1 tablet (80 mg total) by mouth every evening   carvedilol (COREG CR) 40 MG 24 hr capsule   Yes No   Sig: Take 40 mg by mouth daily   ezetimibe (ZETIA) 10 mg tablet  Self Yes No   Sig: Take 10 mg by mouth daily Take one tablet by mouth once daily at night    losartan-hydrochlorothiazide (HYZAAR) 50-12 5 mg per tablet   No No   Sig: Take 1 tablet by mouth daily   nitroglycerin (NITROSTAT) 0 4 mg SL tablet   No No   Sig: Place 1 tablet (0 4 mg total) under the tongue every 5 (five) minutes as needed for chest pain   ticagrelor (BRILINTA) 90 MG   No No   Sig: Take 1 tablet (90 mg total) by mouth every 12 (twelve) hours Facility-Administered Medications: None       Past Medical History:   Diagnosis Date    Asthma     Atrial fibrillation (HCC)     Coronary artery disease     HTN (hypertension)     Hyperlipidemia     [facility] infarction ([facility] [address] ) 02/2019    Stroke ([facility] )     TIA (transient ischemic attack)        Past Surgical History:   Procedure Laterality Date    APPENDECTOMY      CARDIAC CATHETERIZATION  04/17/2020    RCA: 80% distal lesion  LCX/OM2 stent placement  LAD with 70-80% lesion at D1 bifurcation   CARDIAC CATHETERIZATION  2019    OM2 and proximal LCX stenting   CATARACT EXTRACTION Right     CORONARY ARTERY BYPASS GRAFT      CORONARY STENT PLACEMENT  2019    OM2 and proximal LCX stenting    NJ THROMBOENDARTECTMY NECK,NECK INCIS Left 6/18/2022    Procedure: ENDARTERECTOMY ARTERY CAROTID WITH PATCH ANGIOPLASTY;  Surgeon: Iwona Santoyo DO;  [facility]: [facility];  Service: Vascular       Family History   Problem Relation Age of Onset    Heart disease Mother     Kidney disease Mother     Heart disease Father      I have reviewed and agree with the history as documented  E-Cigarette/Vaping    E-Cigarette Use Never User      E-Cigarette/Vaping Substances    Nicotine No     THC No     CBD No     Flavoring No     Other No     Unknown No      Social History     Tobacco Use    Smoking status: Never Smoker    Smokeless tobacco: Never Used   Vaping Use    Vaping Use: Never used   Substance Use Topics    Alcohol use: Never    Drug use: Never       Review of Systems   Constitutional: Negative for activity change, appetite change, chills and fever  HENT: Negative for ear pain, hearing loss, rhinorrhea, sneezing, sore throat and trouble swallowing  Eyes: Negative for pain and visual disturbance  Respiratory: Negative for cough, choking, chest tightness, shortness of breath, wheezing and stridor  Cardiovascular: Negative for chest pain, palpitations and leg swelling     Gastrointestinal: Negative for abdominal pain, constipation, diarrhea, nausea and vomiting  Genitourinary: Negative for difficulty urinating, dysuria, frequency, hematuria and testicular pain  Musculoskeletal: Negative for arthralgias, back pain, gait problem and neck pain  Skin: Positive for wound  Negative for color change and rash  Post-CEA surgery on left   Allergic/Immunologic: Negative for immunocompromised state  Neurological: Negative for dizziness, seizures, syncope, speech difficulty, weakness, light-headedness, numbness and headaches  Psychiatric/Behavioral: Negative for confusion  The patient is not nervous/anxious  All other systems reviewed and are negative  Physical Exam  Physical Exam  Vitals and nursing note reviewed  Constitutional:       General: He is not in acute distress  Appearance: Normal appearance  He is well-developed and normal weight  He is not ill-appearing, toxic-appearing or diaphoretic  HENT:      Head: Normocephalic and atraumatic  Nose: Nose normal       Mouth/Throat:      Mouth: Mucous membranes are moist       Pharynx: Oropharynx is clear  Eyes:      General: No scleral icterus  Extraocular Movements: Extraocular movements intact  Conjunctiva/sclera: Conjunctivae normal    Cardiovascular:      Rate and Rhythm: Normal rate and regular rhythm  Pulses: Normal pulses  Heart sounds: Normal heart sounds  No murmur heard  Pulmonary:      Effort: Pulmonary effort is normal  No respiratory distress  Breath sounds: Normal breath sounds  No wheezing or rales  Chest:      Chest wall: No tenderness  Abdominal:      General: Bowel sounds are normal  There is no distension  Palpations: Abdomen is soft  There is no mass  Tenderness: There is no abdominal tenderness  There is no right CVA tenderness, left CVA tenderness, guarding or rebound  Musculoskeletal:         General: No tenderness, deformity or signs of injury   Normal range of motion  Cervical back: Normal range of motion and neck supple  Right lower leg: No edema  Left lower leg: No edema  Lymphadenopathy:      Cervical: No cervical adenopathy  Skin:     General: Skin is warm and dry  Capillary Refill: Capillary refill takes less than 2 seconds  Findings: No bruising, erythema, lesion or rash  Comments: Left carotid endarterectomy postsurgical changes  Incisions clean dry and intact  Postop ecchymosis noted  No dehiscence or drainage  Neurological:      General: No focal deficit present  Mental Status: He is alert and oriented to person, place, and time  Motor: No abnormal muscle tone     Psychiatric:         Mood and Affect: Mood normal          Behavior: Behavior normal          Vital Signs  ED Triage Vitals   Temperature Pulse Respirations Blood Pressure SpO2   06/22/22 0911 06/22/22 0911 06/22/22 0911 06/22/22 0911 06/22/22 0911   (!) 97 2 °F (36 2 °C) 84 20 (!) 251/119 97 %      Temp Source Heart Rate Source Patient Position - Orthostatic VS BP Location FiO2 (%)   06/22/22 0911 06/22/22 0911 06/22/22 1030 06/22/22 0911 --   Temporal Monitor Lying Right arm       Pain Score       06/22/22 0911       No Pain           Vitals:    06/22/22 1254 06/22/22 1423 06/22/22 1430 06/22/22 1445   BP: 160/80 (!) 192/86 (!) 176/73 169/79   Pulse:   77 81   Patient Position - Orthostatic VS:   Lying Lying         Visual Acuity      ED Medications  Medications   labetalol (NORMODYNE) injection 10 mg (10 mg Intravenous Given 6/22/22 1014)   labetalol (NORMODYNE) injection 40 mg (40 mg Intravenous Given 6/22/22 1109)   hydrALAZINE (APRESOLINE) injection 10 mg (10 mg Intravenous Given 6/22/22 1220)   hydrALAZINE (APRESOLINE) injection 20 mg (20 mg Intravenous Given 6/22/22 1423)       Diagnostic Studies  Results Reviewed     Procedure Component Value Units Date/Time    HS Troponin I 2hr [[id number]]  (Normal) Collected: 06/22/22 1227    Lab Status: Final result Specimen: Blood from Arm, Left Updated: 06/22/22 1255     hs TnI 2hr 19 ng/L      Delta 2hr hsTnI -2 ng/L     HS Troponin I 4hr [[id number]]     Lab Status: No result Specimen: Blood     HS Troponin 0hr (reflex protocol) [[id number]]  (Normal) Collected: 06/22/22 1016    Lab Status: Final result Specimen: Blood from Arm, Left Updated: 06/22/22 1043     hs TnI 0hr 21 ng/L     Basic metabolic panel [[id number]]  (Abnormal) Collected: 06/22/22 1016    Lab Status: Final result Specimen: Blood from Arm, Left Updated: 06/22/22 1030     Sodium 139 mmol/L      Potassium 3 2 mmol/L      Chloride 100 mmol/L      CO2 30 mmol/L      ANION GAP 9 mmol/L      BUN 28 mg/dL      Creatinine 1 26 mg/dL      Glucose 125 mg/dL      Calcium 9 1 mg/dL      eGFR 58 ml/min/1 73sq m     Narrative:      [facility] guidelines for Chronic Kidney Disease (CKD):     Stage 1 with normal or high GFR (GFR > 90 mL/min/1 73 square meters)    Stage 2 Mild CKD (GFR = 60-89 mL/min/1 73 square meters)    Stage 3A Moderate CKD (GFR = 45-59 mL/min/1 73 square meters)    Stage 3B Moderate CKD (GFR = 30-44 mL/min/1 73 square meters)    Stage 4 Severe CKD (GFR = 15-29 mL/min/1 73 square meters)    Stage 5 End Stage CKD (GFR <15 mL/min/1 73 square meters)  Note: GFR calculation is accurate only with a steady state creatinine    CBC and differential [[id number]]  (Abnormal) Collected: 06/22/22 1016    Lab Status: Final result Specimen: Blood from Arm, Left Updated: 06/22/22 1023     WBC 10 23 Thousand/uL      RBC 5 23 Million/uL      Hemoglobin 15 3 g/dL      Hematocrit 45 6 %      MCV 87 fL      MCH 29 3 pg      MCHC 33 6 g/dL      RDW 13 2 %      MPV 9 4 fL      Platelets 802 Thousands/uL      nRBC 0 /100 WBCs      Neutrophils Relative 76 %      Immat GRANS % 0 %      Lymphocytes Relative 14 %      Monocytes Relative 8 %      Eosinophils Relative 2 %      Basophils Relative 0 %      Neutrophils Absolute 7 76 Thousands/µL Immature Grans Absolute 0 04 Thousand/uL      Lymphocytes Absolute 1 38 Thousands/µL      Monocytes Absolute 0 83 Thousand/µL      Eosinophils Absolute 0 18 Thousand/µL      Basophils Absolute 0 04 Thousands/µL                  No orders to display              Procedures  ECG 12 Lead Documentation Only    Date/Time: 6/22/2022 10:08 AM  Performed by: Lencho Sherman DO  Authorized by: Lencho Sherman DO     Indications / Diagnosis:  HTN  ECG reviewed by me, the ED Provider: yes    Patient location:  ED  Rate:     ECG rate:  84    ECG rate assessment: normal    Rhythm:     Rhythm: sinus rhythm    Ectopy:     Ectopy: none    QRS:     QRS axis:  Indeterminate    QRS intervals:  Normal  Conduction:     Conduction: abnormal      Abnormal conduction: LPFB    ST segments:     ST segments:  Normal  T waves:     T waves: non-specific      CriticalCare Time  Performed by: Lencho Sherman DO  Authorized by: Lencho Sherman DO     Critical care provider statement:     Critical care time (minutes):  35    Critical care time was exclusive of:  Separately billable procedures and treating other patients and teaching time    Critical care was necessary to treat or prevent imminent or life-threatening deterioration of the following conditions: hypertensive urgency  Critical care was time spent personally by me on the following activities:  Obtaining history from patient or surrogate, development of treatment plan with patient or surrogate, discussions with consultants, evaluation of patient's response to treatment, examination of patient, ordering and review of laboratory studies, re-evaluation of patient's condition and review of old charts    I assumed direction of critical care for this patient from another provider in my specialty: no               ED Course        PATIENT STATES HE HAD A VERY LARGE BOWEL MOVEMENT AND STATES HE FEELS [facility]    CONTINUE MONITOR BLOOD PRESSURE and discharge upon improvement of his hypertension  SBIRT 22yo+    Flowsheet Row Most Recent Value   SBIRT (25 yo +)    In order to provide better care to our patients, we are screening all of our patients for alcohol and drug use  Would it be okay to ask you these screening questions? Yes Filed at: 06/22/2022 3201   Initial Alcohol Screen: US AUDIT-C     1  How often do you have a drink containing alcohol? 0 Filed at: 06/22/2022 0922   2  How many drinks containing alcohol do you have on a typical day you are drinking? 0 Filed at: 06/22/2022 0922   3a  Male UNDER 65: How often do you have five or more drinks on one occasion? 0 Filed at: 06/22/2022 0922   3b  FEMALE Any Age, or MALE 65+: How often do you have 4 or more drinks on one occassion? 0 Filed at: 06/22/2022 1408   Audit-C Score 0 Filed at: 06/22/2022 5447   FRED: How many times in the past year have you    Used an illegal drug or used a prescription medication for non-medical reasons? Never Filed at: 06/22/2022 0352                    MDM    Disposition  Final diagnoses:   High blood pressure   Hypertensive urgency     Time reflects when diagnosis was documented in both MDM as applicable and the Disposition within this note     Time User Action Codes Description Comment    6/22/2022 12:53 PM Aniamac Vargas T Add [I10] High blood pressure     6/22/2022 12:53 PM Ania Kingdom T Add [I16 0] Hypertensive urgency       ED Disposition     ED Disposition   Discharge    Condition   Stable    Date/Time   Wed Jun 22, 2022 12:53 PM    Comment   Roxanna Saucedo discharge to home/self care                 Follow-up Information    None         Discharge Medication List as of 6/22/2022 12:54 PM      CONTINUE these medications which have NOT CHANGED    Details   albuterol (PROVENTIL HFA,VENTOLIN HFA) 90 mcg/act inhaler Inhale 2 puffs every 6 (six) hours as needed for wheezing, Starting Thu 12/10/2020, Normal      aspirin ([address]) 81 mg EC tablet Take 81 mg by mouth daily, Historical Med      atorvastatin (LIPITOR) 80 mg tablet Take 1 tablet (80 mg total) by mouth every evening, Starting Tue 6/21/2022, Until Thu 7/21/2022, Normal      carvedilol (COREG CR) 40 MG 24 hr capsule Take 40 mg by mouth daily, Historical Med      ezetimibe (ZETIA) 10 mg tablet Take 10 mg by mouth daily Take one tablet by mouth once daily at night , Historical Med      losartan-hydrochlorothiazide (HYZAAR) 50-12 5 mg per tablet Take 1 tablet by mouth daily, Starting Tue 6/21/2022, Until Thu 7/21/2022, Normal      nitroglycerin (NITROSTAT) 0 4 mg SL tablet Place 1 tablet (0 4 mg total) under the tongue every 5 (five) minutes as needed for chest pain, Starting Thu 2/18/2021, Normal      Symbicort 160-4 5 MCG/ACT inhaler Inhale 2 puffs 2 (two) times a day Rinse mouth after use , Normal      ticagrelor (BRILINTA) 90 MG Take 1 tablet (90 mg total) by mouth every 12 (twelve) hours, Starting Tue 6/21/2022, Until Thu 7/21/2022, Normal             No discharge procedures on file      PDMP Review     None          ED Provider  Electronically Signed by           Christy Elizabeth DO  06/22/22 [address], [place]  06/22/22 4993

## 2022-06-22 NOTE — DISCHARGE INSTRUCTIONS
Call your primary care physician and/or cardiologist for further instructions regarding her blood pressure medications  Return emergency department if condition worsens

## 2022-06-23 ENCOUNTER — TELEPHONE (OUTPATIENT)
Dept: VASCULAR SURGERY | Facility: CLINIC | Age: 67
End: 2022-06-23

## 2022-06-23 LAB
ATRIAL RATE: 85 BPM
P AXIS: 82 DEGREES
PR INTERVAL: 150 MS
QRS AXIS: 142 DEGREES
QRSD INTERVAL: 96 MS
QT INTERVAL: 360 MS
QTC INTERVAL: 428 MS
T WAVE AXIS: -39 DEGREES
VENTRICULAR RATE: 85 BPM

## 2022-06-23 PROCEDURE — 93880 EXTRACRANIAL BILAT STUDY: CPT | Performed by: SURGERY

## 2022-06-23 PROCEDURE — 93010 ELECTROCARDIOGRAM REPORT: CPT | Performed by: INTERNAL MEDICINE

## 2022-06-23 NOTE — TELEPHONE ENCOUNTER
Vascular Nurse Navigator Post Op Call    Procedure: ENDARTERECTOMY ARTERY CAROTID WITH PATCH ANGIOPLASTY (Left)    Date of Procedure:  6/18/22    Surgeon:   Lupillo Venegas [address] [place] [address], DO - Primary     * Agnieszka Ziegler DO - Fellow    Discharge Date:  6/21/22    Discharge Disposition:  Home    Change in Vision?: No    Change in Speech?: No    Weakness?: No    Uncontrolled Pain?: No    Hoarseness?: No    Trouble Swallowing?: No    Incision Concerns?: No    Anticoagulation pt was discharged on post op?: Aspirin and Ticagrelor (Brillanta)    Statin pt was discharged on post op?: Lipitor (atorvastatin)    Bleeding?: No      Reviewed discharge instructions and incision care with patient  NEXT OFFICE VISIT SCHEDULED:  6/30/22 at 3 pm with Dina Oconnell PA-C at [facility] [address] [place] [address]?: Yes      Any further questions/concerns? Patient stated that he is doing much better today  He stated that he got his blood pressure is under control  Reviewed incision care with him - wash daily with soap and water  Reviewed discharge medications - Aspirin, Brillinta, and Lipitor  All questions answered  No concerns expressed at this time

## 2022-06-28 ENCOUNTER — TELEPHONE (OUTPATIENT)
Dept: NEUROLOGY | Facility: CLINIC | Age: 67
End: 2022-06-28

## 2022-06-28 NOTE — TELEPHONE ENCOUNTER
Post CVA Discharge Follow Up  Hospitalization: 6/16/2022 - 6/21/2022     Called patient  Right away he said that he had an episode of numbness of the right leg last night (knee - foot)  States he woke up in the middle of the night and felt it  States it felt like a sock was on his leg  He is not sure how long this lasted, but states walking around improved it  Reports that he feels as though there is still some numbness around his ankle/bone, but is very mild  I advised patient be evaluated in ER or Urgent Care, but he declined  States he will indeed go to ER if he has any other new or worsening stroke-like symptoms  As for healing, states his tongue deviates to the left still as well, but this has improved slightly since last evaluated by inpatient neuro team      During call, patient is preoccupied with getting a meal deliver  I offered to call another time, he declined  He did review the following with me: We reviewed stroke type, symptoms, personal risk factors and management, medications, and resources  Patient would benefit from more education  Offered to send stroke education binder in the mail, they are agreeable to this  I mailed the information as requested  As for risk factors, he has been trying to manage his BP  There was a day he was in the ED on 6/22 for elevated BP, but it has since normalized to 140s/77  Reviewed appointments - Patient scheduled fot MRI tomorrow, vascular surgery 6/30, cardiology 7/11, endo 7/28, stroke HFU is 8/19 with Betzy Grajeda  As for medications, patient denies having any questions or concerns regarding medications  He is taking as prescribed and is family also monitors PRN  I addressed all his questions  At the conclusion of the conversation, patient denies having any further questions or concerns

## 2022-06-30 ENCOUNTER — OFFICE VISIT (OUTPATIENT)
Dept: VASCULAR SURGERY | Facility: HOSPITAL | Age: 67
End: 2022-06-30

## 2022-06-30 VITALS
HEART RATE: 77 BPM | HEIGHT: 76 IN | DIASTOLIC BLOOD PRESSURE: 92 MMHG | SYSTOLIC BLOOD PRESSURE: 150 MMHG | TEMPERATURE: 98.6 F | WEIGHT: 229 LBS | BODY MASS INDEX: 27.89 KG/M2

## 2022-06-30 DIAGNOSIS — I65.29 STENOSIS OF CAROTID ARTERY, UNSPECIFIED LATERALITY: Primary | ICD-10-CM

## 2022-06-30 PROCEDURE — 99024 POSTOP FOLLOW-UP VISIT: CPT | Performed by: PHYSICIAN ASSISTANT

## 2022-06-30 NOTE — PATIENT INSTRUCTIONS
Carotid Artery Disease   AMBULATORY CARE:   Carotid artery disease (CAD)  means the major blood vessels in your neck are narrowed or becoming blocked  These 2 major blood vessels are called the carotid arteries  They supply your brain with blood  The narrow or blocked blood vessels increase your risk for a stroke  CAD is also called carotid artery stenosis  Have someone call your local emergency number (911 in the [address] [facility]) if:   You have any of the following signs of a stroke:      Numbness or drooping on one side of your face     Weakness in an arm or leg    Confusion or difficulty speaking    Dizziness, a severe headache, or vision loss    You have any of the following signs of a heart attack:      Squeezing, pressure, or pain in your chest    You may  also have any of the following:     Discomfort or pain in your back, neck, jaw, stomach, or arm    Shortness of breath    Nausea or vomiting    Lightheadedness or a sudden cold sweat    Call your doctor if:   You have questions or concerns about your condition or care  Common signs and symptoms:  CAD develops slowly  You may have no signs or symptoms until you have a mini-stroke, or transient ischemic attack (TIA)  A TIA is a temporary lack of blood flow to your brain  A TIA goes away quickly and does not cause permanent damage  A TIA may be a warning sign that you are about to have a stroke  If you have any symptoms of a TIA or stroke, seek care immediately  Warning signs of a stroke: The words BE FAST can help you remember and recognize warning signs of a stroke:  B = Balance:  Sudden loss of balance    E = Eyes:  Loss of vision in one or both eyes    F = Face:  Face droops on one side    A = Arms:  Arm drops when both arms are raised    S = Speech:  Speech is slurred or sounds different    T = Time:  Time to get help immediately       Treatment  depends on how narrow your arteries have become   Treatment also depends on your symptoms and your general health  The goal of treatment is to lower your risk for a stroke  You may need any of the following:  Medicines:      Aspirin,  or other blood thinner, may be recommended  These will help prevent blood clots from forming in your carotid arteries  If your healthcare provider wants you to take aspirin, do not take acetaminophen or ibuprofen instead  Cholesterol medicine  lowers your cholesterol level  Blood pressure medicine  lowers or helps control your blood pressure  Procedures  can help open blocked arteries:     Carotid endarterectomy (CEA)  is used to cut and remove plaque buildup from your arteries  Carotid angioplasty and stenting (SNOW)  is used to push the plaque against the artery wall with a balloon device  Then, a stent is placed to keep the artery open  A stent is a small metal mesh tube  Prevent a stroke:   Do not smoke, and avoid secondhand smoke  Nicotine and other chemicals in cigarettes and cigars increase your risk for a stroke  Ask your healthcare provider for information if you currently smoke and need help to quit  E-cigarettes or smokeless tobacco still contain nicotine  Talk to your healthcare provider before you use these products  Eat a variety of healthy foods  Healthy foods include fruit, vegetables, whole-grain breads, low-fat dairy products, chicken, and fish  Choose fish that are high in omega-3 fatty acids, such as salmon and fresh tuna  Ask your healthcare provider for more information on a heart healthy diet and the DASH eating plan  Limit sodium (salt)  Sodium may increase your blood pressure  Add less table salt to your foods  Read food labels and choose foods that are low in sodium  Your healthcare provider may suggest you follow a low sodium diet  Reach or maintain a healthy weight  Extra weight makes your heart work harder  Ask your healthcare provider what a healthy weight is for you  He or she can help you create a safe weight loss plan  Even a weight loss of 10% of your extra body weight can help your heart function better  Exercise regularly  Exercise helps improve heart function and can help you manage your weight  Exercise can also help lower your cholesterol and blood sugar levels  Try to get at least 30 minutes of exercise 5 times each week  Try to be physically active every day  This may include walking, riding a bicycle, or swimming  Your healthcare provider can help you create an exercise plan that works best for you  Limit alcohol  Alcohol can increase your blood pressure and triglyceride levels  A drink of alcohol is 12 ounces of beer, 5 ounces of wine, or 1½ ounces of liquor  Follow up with your doctor as directed:  Write down your questions so you remember to ask them during your visits  © Copyright [employer] 2022 Information is for End User's use only and may not be sold, redistributed or otherwise used for commercial purposes  All illustrations and images included in CareNotes® are the copyrighted property of A D [employer] M [employer]  or [address] [place], [place] [address]   The above information is an  only  It is not intended as medical advice for individual conditions or treatments  Talk to your doctor, nurse or pharmacist before following any medical regimen to see if it is safe and effective for you

## 2022-06-30 NOTE — ASSESSMENT & PLAN NOTE
70-year-old male w/HTN, HLD, CAD s/p CABG, atrial fibrillation, history of CVA in the setting of symptomatic left ICA stenosis, now s/p left CEA on 06/18/2022 by Dr Miley Hobson, who presents for postoperative visit  -patient doing well postoperatively  He has had bouts of hypertension which prompted an emergency department visit  -incision is clean, dry and intact with minimal swelling  No breakdown erythema or drainage  -remains neurologically intact without TIA or stroke symptoms  -blood pressure remains stable in the office today  -continue ASA 81 mg, Brilinta, statin  -reviewed incisional care with patient  -will obtain carotid duplex in 3 months with follow-up in the office with Dr Miley Hobson  -advised the patient contact his cardiologist regarding elevated blood pressure readings at home  Patient also reports that he has been taking nitro daily as he noticed it lowers his blood pressure  I did discuss that he should contact his cardiologist before he continues to keep doing this as the nitro was prescribed for p r n   Chest pain  -advised patient to contact our office with any changes concerns or questions

## 2022-06-30 NOTE — PROGRESS NOTES
Assessment/Plan:    Carotid artery stenosis  71-year-old male w/HTN, HLD, CAD s/p CABG, atrial fibrillation, history of CVA in the setting of symptomatic left ICA stenosis, now s/p left CEA on 06/18/2022 by Dr Neptali Pitt, who presents for postoperative visit  -patient doing well postoperatively  He has had bouts of hypertension which prompted an emergency department visit  -incision is clean, dry and intact with minimal swelling  No breakdown erythema or drainage  -remains neurologically intact without TIA or stroke symptoms  -blood pressure remains stable in the office today  -continue ASA 81 mg, Brilinta, statin  -reviewed incisional care with patient  -will obtain carotid duplex in 3 months with follow-up in the office with Dr Neptali Pitt  -advised the patient contact his cardiologist regarding elevated blood pressure readings at home  Patient also reports that he has been taking nitro daily as he noticed it lowers his blood pressure  I did discuss that he should contact his cardiologist before he continues to keep doing this as the nitro was prescribed for p r n  Chest pain  -advised patient to contact our office with any changes concerns or questions       Diagnoses and all orders for this visit:    Stenosis of carotid artery, unspecified laterality  -     VAS carotid complete study; Future          Subjective:      Patient ID: Claudia Pan is a 79 y o  male  Pt is PO L CEA w/ patch angio done on 6/18/22  Pt reports slight numbness  Pt denies trouble swallowing and hoarseness  Pt denies TIA or stroke like symptoms  Pt is taking asa 81mg and atorvastatin        VQI CEA Long-Term Follow-Up    Myocardial Infarction - No    Neurological Event - Yes    Reperfusion Symptoms - No    Modified Luis Score - 1 - No significant disability (Able to carry out all usual activities, despite some symptoms        Functional Status - Full         71-year-old male with hypertension, hyperlipidemia, CAD s/p CABG, atrial fibrillation, history of CVA the setting of symptomatic left ICA stenosis now s/p left CEA by Dr Tavares Carreno on 6/18/22, presenting for postoperative visit  Patient has been doing well postoperatively  He reports that after discharge he noticed his blood pressure was significantly elevated at home reading in the systolics over 391  Patient presented to the emergency department where was determined that he was missing 1 of his blood pressure medications and not been taking it  Since taking all of his blood pressure medications, he has noticed that his blood pressure has been under better control  He does note that when he wakes up that his been slightly more elevated and he has been taking his sublingual nitro daily  However this is not because of chest pain rather that he feels "it helps with his blood pressure"  Patient follows with cardiology which she is going to schedule an appointment to review his medications  Overall he denies TIA or stroke-like symptoms  He has been doing well after surgery  Denies significant pain or issues with his incision  The following portions of the patient's history were reviewed and updated as appropriate: allergies, current medications, past family history, past medical history, past social history, past surgical history and problem list     Review of Systems   Constitutional: Negative  HENT: Negative  Eyes: Negative  Respiratory: Negative  Cardiovascular: Negative  Gastrointestinal: Negative  Endocrine: Negative  Genitourinary: Negative  Musculoskeletal: Negative  Skin: Negative  Allergic/Immunologic: Negative  Neurological: Negative  Hematological: Negative  Psychiatric/Behavioral: Negative  I have reviewed and made appropriate changes to the review of systems input by the medical assistant      Vitals:    06/30/22 1520   BP: 150/92   BP Location: Left arm   Patient Position: Sitting   Cuff Size: Standard   Pulse: 77 Temp: 98 6 °F (37 °C)   TempSrc: Tympanic   Weight: 104 kg (229 lb)   Height: 6' 4" (1 93 m)       Patient Active Problem List   Diagnosis    S/P CABG (coronary artery bypass graft)    Uncomplicated asthma    Mixed hyperlipidemia    Coronary artery disease involving native coronary artery of native heart without angina pectoris    Essential hypertension    Chest pain    Stroke [facility])    Acute CVA [facility]cerebrovascular accident) ([facility] [address] )    Carotid artery stenosis    Thyroid nodule    Atrial fibrillation (HCC)    Elevated serum creatinine    Hypokalemia    Leukocytosis       Past Surgical History:   Procedure Laterality Date    APPENDECTOMY      CARDIAC CATHETERIZATION  04/17/2020    RCA: 80% distal lesion  LCX/OM2 stent placement  LAD with 70-80% lesion at D1 bifurcation   CARDIAC CATHETERIZATION  2019    OM2 and proximal LCX stenting      CATARACT EXTRACTION Right     CORONARY ARTERY BYPASS GRAFT      CORONARY STENT PLACEMENT  2019    OM2 and proximal LCX stenting    OR THROMBOENDARTECTMY NECK,NECK INCIS Left 6/18/2022    Procedure: ENDARTERECTOMY ARTERY CAROTID WITH PATCH ANGIOPLASTY;  Surgeon: Lisy Hung DO;  Location: [facility];  Service: Vascular       Family History   Problem Relation Age of Onset    Heart disease Mother     Kidney disease Mother     Heart disease Father        Social History     Socioeconomic History    Marital status: /Civil Union     Spouse name: Not on file    Number of children: Not on file    Years of education: Not on file    Highest education level: Not on file   Occupational History    Not on file   Tobacco Use    Smoking status: Never Smoker    Smokeless tobacco: Never Used   Vaping Use    Vaping Use: Never used   Substance and Sexual Activity    Alcohol use: Never    Drug use: Never    Sexual activity: Not Currently   Other Topics Concern    Not on file   Social History Narrative    Not on file     Social Determinants of Health Financial Resource Strain: Not on file   Food Insecurity: No Food Insecurity    Worried About Running Out of Food in the Last Year: Never true    Nito of Food in the Last Year: Never true   Transportation Needs: No Transportation Needs    Lack of Transportation (Medical): No    Lack of Transportation (Non-Medical):  No   Physical Activity: Not on file   Stress: Not on file   Social Connections: Not on file   Intimate Partner Violence: Not on file   Housing Stability: Low Risk     Unable to Pay for Housing in the Last Year: No    Number of Places Lived in the Last Year: 1    Unstable Housing in the Last Year: No       Allergies   Allergen Reactions    Eggs Or Egg-Derived Products - Food Allergy Shortness Of Breath         Current Outpatient Medications:     albuterol (PROVENTIL HFA,VENTOLIN HFA) 90 mcg/act inhaler, Inhale 2 puffs every 6 (six) hours as needed for wheezing, Disp: 1 Inhaler, Rfl: 3    aspirin (ECOTRIN LOW STRENGTH) 81 mg EC tablet, Take 81 mg by mouth daily, Disp: , Rfl:     atorvastatin (LIPITOR) 80 mg tablet, Take 1 tablet (80 mg total) by mouth every evening, Disp: 30 tablet, Rfl: 0    carvedilol (COREG CR) 40 MG 24 hr capsule, Take 40 mg by mouth daily, Disp: , Rfl:     ezetimibe (ZETIA) 10 mg tablet, Take 10 mg by mouth daily Take one tablet by mouth once daily at night , Disp: , Rfl:     losartan-hydrochlorothiazide (HYZAAR) 50-12 5 mg per tablet, Take 1 tablet by mouth daily, Disp: 30 tablet, Rfl: 0    nitroglycerin (NITROSTAT) 0 4 mg SL tablet, Place 1 tablet (0 4 mg total) under the tongue every 5 (five) minutes as needed for chest pain, Disp: 25 tablet, Rfl: 1    Symbicort 160-4 5 MCG/ACT inhaler, Inhale 2 puffs 2 (two) times a day Rinse mouth after use , Disp: 10 2 g, Rfl: 0    ticagrelor (BRILINTA) 90 MG, Take 1 tablet (90 mg total) by mouth every 12 (twelve) hours, Disp: 60 tablet, Rfl: 0  Objective:      /92 (BP Location: Left arm, Patient Position: Sitting, Cuff Size: Standard)   Pulse 77   Temp 98 6 °F (37 °C) (Tympanic)   Ht 6' 4" (1 93 m)   Wt 104 kg (229 lb)   BMI 27 87 kg/m²          Physical Exam  Constitutional:       Appearance: Normal appearance  HENT:      Head: Normocephalic and atraumatic  Cardiovascular:      Rate and Rhythm: Normal rate and regular rhythm  Pulmonary:      Effort: Pulmonary effort is normal       Breath sounds: Normal breath sounds  Skin:     Comments: Incision clean, dry and intact  No surrounding erythema, breakdown or drainage  Neurological:      General: No focal deficit present  Mental Status: He is alert and oriented to person, place, and time  Cranial Nerves: No cranial nerve deficit  Motor: No weakness     Psychiatric:         Mood and Affect: Mood normal          Behavior: Behavior normal

## 2022-07-08 NOTE — PROGRESS NOTES
Cardiology Follow Up    Mendy Dickinson  1955  [id number]  [facility] [address] [place] [address]  [phone]    1  Coronary artery disease involving native coronary artery of native heart without angina pectoris     2  S/P CABG (coronary artery bypass graft)     3  Essential hypertension     4  Dyslipidemia  Lipid Panel with Direct LDL reflex    Comprehensive metabolic panel   5  Carotid artery stenosis, symptomatic, left     6  Status post carotid endarterectomy     7  Claudication ([address] )  VAS lower limb arterial duplex, complete bilateral       Discussion/Summary:  Coronary artery disease: 2019 - OM2 and circumflex stenting, 2020 - OM1 and RCA stenting, 2020 - robotic CABG - LIMA-LAD  Currently stable without angina  Continue dual antiplatelet therapy, statin, beta-blocker  Hypertension: currently well controlled  Has had intermittent elevations at home  He is taking prn nitroglycerin to lower his BP  I did advise him against this  Rather I advised him to monitor his blood pressure daily at least 1 hour after his morning medications  Once we have a better idea of his average blood pressure his maintenance antihypertensive medication regimen can be adjusted  No adjustments will be made today as BP is 120/68 in the office today  Dyslipidemia: most recent LDL of 130  Now on atorvastatin 80 mg daily and zetia 10 mg  Repeat lipid panel + CMP in 3 months  Recent CVA, carotid artery stenosis s/p left CEA: follows with our vascular group  Continue aspirin, Brilinta, and statin  Per his wife's request will order a bilateral lower extremity arterial duplex  He will RTO in 3 months with Dr Shamir Dotson or sooner if necessary  He will call with any concerns      Interval History:   Mendy Dickinson is a 79 y o  male with coronary artery disease s/p prior stent placement to OM2 and circumflex in 2019, s/p RCA and OM1 stenting in 2020 with subsequent robotic CABG in April 2020 - LIMA-LAD, hypertension, dyslipidemia, recent CVA involving the left frontal and left occipital lobes secondary to left carotid artery stenosis s/p left CEA who presents to the office today for hospital follow up  Patient was recently hospitalized with symptoms of right sided numbness and was found to have acute infarcts noted in the left front and left occipital lobes  He also was found to have severe (70-99%) stenosis of the left internal carotid artery which was felt to be the cause of his CVA  He underwent left sided carotid endarterectomy 6/18/22  Since his last office visit he has been feeling overall well  He feels he is getting stronger every day  e does report feeling a bit "foggy" although he denies lightheadedness, dizziness, and syncope  He has not done much physical activity since discharge  With the activity he performs he denies any shortness of breath or chest pain/pressure/discomfort  He denies lower extremity edema, orthopnea, and PND  He denies palpitations  He does check his blood pressure daily  He states he has been taking nitroglycerin tablets every few days  He does not take this due to chest pain but rather when his blood pressure becomes high  He states his blood pressure often is elevated in the 180's first thing in the morning before he takes his medications      Medical Problems             Problem List     S/P CABG (coronary artery bypass graft)    Uncomplicated asthma    Mixed hyperlipidemia (Chronic)    Coronary artery disease involving native coronary artery of native heart without angina pectoris (Chronic)    Essential hypertension (Chronic)    Chest pain    Stroke [facility])    Acute CVA (cerebrovascular accident) ([facility] [address] )    Carotid artery stenosis    Thyroid nodule    Atrial fibrillation (HCC)    Elevated serum creatinine    Hypokalemia    Leukocytosis              Past Medical History:   Diagnosis Date  Asthma     Atrial fibrillation (HCC)     Coronary artery disease     HTN (hypertension)     Hyperlipidemia     Myocardial infarction ([facility] [address] ) 02/2019    Stroke ([facility] )     TIA (transient ischemic attack)      Social History     Socioeconomic History    Marital status: /Civil Union     Spouse name: Not on file    Number of children: Not on file    Years of education: Not on file    Highest education level: Not on file   Occupational History    Not on file   Tobacco Use    Smoking status: Never Smoker    Smokeless tobacco: Never Used   Vaping Use    Vaping Use: Never used   Substance and Sexual Activity    Alcohol use: Never    Drug use: Never    Sexual activity: Not Currently   Other Topics Concern    Not on file   Social History Narrative    Not on file     Social Determinants of Health     Financial Resource Strain: Not on file   Food Insecurity: No Food Insecurity    Worried About Running Out of Food in the Last Year: Never true    Nito of Food in the Last Year: Never true   Transportation Needs: No Transportation Needs    Lack of Transportation (Medical): No    Lack of Transportation (Non-Medical): No   Physical Activity: Not on file   Stress: Not on file   Social Connections: Not on file   Intimate Partner Violence: Not on file   Housing Stability: Low Risk     Unable to Pay for Housing in the Last Year: No    Number of Places Lived in the Last Year: 1    Unstable Housing in the Last Year: No      Family History   Problem Relation Age of Onset    Heart disease Mother     Kidney disease Mother     Heart disease Father      Past Surgical History:   Procedure Laterality Date    APPENDECTOMY      CARDIAC CATHETERIZATION  04/17/2020    RCA: 80% distal lesion  LCX/OM2 stent placement  LAD with 70-80% lesion at D1 bifurcation   CARDIAC CATHETERIZATION  2019    OM2 and proximal LCX stenting      CATARACT EXTRACTION Right     CORONARY ARTERY BYPASS GRAFT      CORONARY STENT PLACEMENT  2019    OM2 and proximal LCX stenting    NJ THROMBOENDARTECTMY NECK,NECK INCIS Left 6/18/2022    Procedure: ENDARTERECTOMY ARTERY CAROTID WITH PATCH ANGIOPLASTY;  Surgeon: Betti Essex, DO;  Location: [facility];  Service: Vascular       Current Outpatient Medications:     albuterol (PROVENTIL HFA,VENTOLIN HFA) 90 mcg/act inhaler, Inhale 2 puffs every 6 (six) hours as needed for wheezing, Disp: 1 Inhaler, Rfl: 3    aspirin (ECOTRIN LOW STRENGTH) 81 mg EC tablet, Take 81 mg by mouth daily, Disp: , Rfl:     atorvastatin (LIPITOR) 80 mg tablet, Take 1 tablet (80 mg total) by mouth every evening, Disp: 30 tablet, Rfl: 0    carvedilol (COREG CR) 40 MG 24 hr capsule, Take 40 mg by mouth daily, Disp: , Rfl:     ezetimibe (ZETIA) 10 mg tablet, Take 10 mg by mouth daily Take one tablet by mouth once daily at night , Disp: , Rfl:     losartan-hydrochlorothiazide (HYZAAR) 50-12 5 mg per tablet, Take 1 tablet by mouth daily, Disp: 30 tablet, Rfl: 0    nitroglycerin (NITROSTAT) 0 4 mg SL tablet, Place 1 tablet (0 4 mg total) under the tongue every 5 (five) minutes as needed for chest pain, Disp: 25 tablet, Rfl: 1    Symbicort 160-4 5 MCG/ACT inhaler, Inhale 2 puffs 2 (two) times a day Rinse mouth after use , Disp: 10 2 g, Rfl: 0    ticagrelor (BRILINTA) 90 MG, Take 1 tablet (90 mg total) by mouth every 12 (twelve) hours, Disp: 60 tablet, Rfl: 0  Allergies   Allergen Reactions    Eggs Or Egg-Derived Products - Food Allergy Shortness Of Breath       Labs:     Chemistry        Component Value Date/Time    K 3 2 (L) 06/22/2022 1016     06/22/2022 1016    CO2 30 06/22/2022 1016    BUN 28 (H) 06/22/2022 1016    CREATININE 1 26 06/22/2022 1016        Component Value Date/Time    CALCIUM 9 1 06/22/2022 1016    ALKPHOS 73 06/18/2022 0459    AST 12 06/18/2022 0459    ALT 19 06/18/2022 0459            No results found for: CHOL  Lab Results   Component Value Date    HDL 47 06/16/2022    HDL 47 06/16/2022 HDL 42 06/15/2022     Lab Results   Component Value Date    LDLCALC 130 (H) 06/16/2022    LDLCALC 136 (H) 06/16/2022    LDLCALC 123 (H) 06/15/2022     Lab Results   Component Value Date    TRIG 152 (H) 06/16/2022    TRIG 142 06/16/2022    TRIG 230 (H) 06/15/2022     No results found for: CHOLHDL    Imaging: CTA head and neck with and without contrast    Result Date: 6/15/2022  Narrative: CTA NECK AND BRAIN WITH AND WITHOUT CONTRAST INDICATION: difficulty with speech and right arm and leg numbness one hour ago, symptoms now resolved COMPARISON:   None  TECHNIQUE:  Routine CT imaging of the Brain without contrast   Post contrast imaging was performed after administration of iodinated contrast through the neck and brain  Post contrast axial 0 625 mm images timed to opacify the arterial system  3D rendering was performed on an independent workstation  MIP reconstructions performed  Coronal reconstructions were performed of the noncontrast portion of the brain  Radiation dose length product (DLP) for this visit:  1414 13 mGy-cm   This examination, like all CT scans performed in the [facility], was performed utilizing techniques to minimize radiation dose exposure, including the use of iterative reconstruction and automated exposure control  IV Contrast:  65 mL of iohexol (OMNIPAQUE)  IMAGE QUALITY:   Diagnostic FINDINGS: NONCONTRAST BRAIN PARENCHYMA: Decreased attenuation is noted in periventricular and subcortical white matter demonstrating an appearance that is statistically most likely to represent mild microangiopathic change  No CT signs of acute territorial infarction  Age indeterminate infarcts are noted in the left caudate and left thalamus  No intracranial mass, mass effect or midline shift  No acute parenchymal hemorrhage  VENTRICLES AND EXTRA-AXIAL SPACES:  Normal for the patient's age  VISUALIZED ORBITS AND PARANASAL SINUSES:  Post bilateral ocular lens replacement    Mucosal thickening with frothy secretion in the left maxillary sinus  CERVICAL VASCULATURE AORTIC ARCH AND GREAT VESSELS:  Normal aortic arch and great vessel origins  Normal visualized subclavian vessels  RIGHT VERTEBRAL ARTERY CERVICAL SEGMENT:  Normal origin  The vessel is normal in caliber throughout the neck  LEFT VERTEBRAL ARTERY CERVICAL SEGMENT:  Normal origin  The vessel is normal in caliber throughout the neck  RIGHT EXTRACRANIAL CAROTID SEGMENT:  Trace atherosclerotic disease of the distal common carotid artery and proximal cervical internal carotid artery without significant stenosis compared to the more distal ICA  LEFT EXTRACRANIAL CAROTID SEGMENT:  Severe atherosclerotic changes are noted at the proximal cervical internal carotid artery with severe stenosis/near total occlusion (greater than 90%) just distal to the ICA origin with only a faint sliver of contrast seen at this level (5:340-350)  NASCET criteria was used to determine the degree of internal carotid artery diameter stenosis  INTRACRANIAL VASCULATURE INTERNAL CAROTID ARTERIES:  Normal enhancement of the intracranial portions of the internal carotid arteries  Normal ophthalmic artery origins  Normal ICA terminus  ANTERIOR CIRCULATION:  Symmetric A1 segments and anterior cerebral arteries with normal enhancement  Normal anterior communicating artery  MIDDLE CEREBRAL ARTERY CIRCULATION:  Patent MCA's proximally  There is moderate stenosis within distal M2/perisylvian branch of the left MCA noted (5:151 -161)  DISTAL VERTEBRAL ARTERIES:  Normal distal vertebral arteries, dominant on the left  Posterior inferior cerebellar artery origins are normal  Normal vertebral basilar junction  BASILAR ARTERY:  Basilar artery is normal in caliber  Normal superior cerebellar arteries  POSTERIOR CEREBRAL ARTERIES: There is fetal origin of the right posterior cerebral artery  The left posterior cerebral artery arises from the basilar tip    Both demonstrate no focal stenosis  Normal posterior communicating arteries  VENOUS STRUCTURES:  Patent  NON VASCULAR ANATOMY BONY STRUCTURES:  No acute osseous abnormality  SOFT TISSUES OF THE NECK: Incidental discovery of one or more thyroid nodule(s) measuring more than 1 5 cm and without suspicious features is noted in this patient who is above 28years old; according to guidelines published in the February 2015 white paper on incidental thyroid nodules in the Journal of the [employer] of Radiology Higinio Estrada), further characterization with thyroid ultrasound is recommended  THORACIC INLET:  Normal      Impression: Age indeterminate infarcts left caudate and left thalamus  Mild chronic microangiopathic changes  Suspect acute left maxillary sinusitis  Correlate clinically  No evidence of large vessel occlusion within the major vasculature of the [place]  Moderate stenosis within distal M2/perisylvian branch of the left MCA  Significant atherosclerotic disease left proximal cervical internal carotid artery resulting in severe stenosis/near total occlusion just distal to the ICA origin (greater than 90% by NASCET criteria)  Interventional/surgical consult advised  Incidental thyroid nodules for which nonemergent follow-up thyroid ultrasound is recommended  Above findings discussed with Dr Raquel Walker at 11:15 PM on 6/15/2022  Workstation performed: [id number]     MRI brain wo contrast    Result Date: 6/17/2022  Narrative: MRI BRAIN WITHOUT CONTRAST INDICATION: TIA x 2  COMPARISON:   CTA head and neck with and without contrast Yohana 15, 2022  TECHNIQUE:  Sagittal T1, axial T2, axial FLAIR, axial T1, axial Essex and axial diffusion imaging  IMAGE QUALITY:  Diagnostic  FINDINGS: BRAIN PARENCHYMA: Small multifocal acute infarcts in left frontal and left occipital lobes  Chronic lacunar infarcts in left caudate head and left thalamus  There is no discrete mass, mass effect or midline shift   There is no intracranial hemorrhage  Small scattered hyperintensities on T2/FLAIR imaging are noted in the periventricular and subcortical white matter demonstrating an appearance that is statistically most likely to represent mild microangiopathic change  VENTRICLES:  Normal for the patient's age  SELLA AND PITUITARY GLAND:  Normal  ORBITS: Sequela of bilateral cataract surgery  PARANASAL SINUSES:  Mild mucosal thickening in right frontal and bilateral maxillary sinuses, worse in left maxillary sinus  3 4 cm polypoid lesion with internal inspissated material in right nasal cavity, likely nasal polyp  Smaller polypoid lesion in  the right posterior nasal cavity, likely smaller nasal polyp  VASCULATURE:  Evaluation of the major intracranial vasculature demonstrates appropriate flow voids  CALVARIUM AND SKULL BASE:  Normal  EXTRACRANIAL SOFT TISSUES:  Normal      Impression: Small multifocal acute infarcts in left frontal and left occipital lobes  Chronic lacunar infarcts in left caudate head and left thalamus with mild chronic microangiopathy  Polypoid lesions in right nasal cavity measuring up to 3 4 cm, likely nasal polyps  Consider evaluation by ENT  The study was marked in [facility] for immediate notification  Workstation performed: [id number]     VAS carotid complete study    Result Date: 6/23/2022  Narrative:  [facility] REPORT CLINICAL: Indications: Patient presents with to evaluate for carotid artery stenosis s/p recent TIA  The patient admits symptoms of right sided arm and leg weakness and aphasia which resolved after 1 hour  Operative History: 2019 Coronary stent placement CABG Risk Factors The patient has history of HTN, Hyperlipidemia, A-FIB and CAD  Clinical Right Pressure:  192/98 mm Hg, Left Pressure: IV  FINDINGS:  Right        Impression  PSV  EDV (cm/s)  Direction of Flow  Ratio  Dist  ICA                 32          10                      0 43  Mid   ICA                  74          23                      0 99 Prox  ICA    1 - 49%      79          20                      1 05  Dist CCA                  74          11                            Mid CCA                   75          13                      0 78  Prox CCA                  95           9                      1 52  Ext Carotid              134          10                      1 79  Prox Vert                 50           8  Antegrade                 Subclavian                95           0                            Innominate                63           6                             Left         Impression  PSV  EDV (cm/s)  Direction of Flow  Ratio  Dist  ICA                 56          17                      0 91  Mid  ICA                  78          18                      1 26  Prox  ICA    70 - 99%    728         366                     11 76  Dist CCA                  59          11                            Mid CCA                   62          12                      0 68  Prox CCA                  91          14                            Ext Carotid              132          11                      2 13  Prox Vert                 84          22  Antegrade                 Subclavian               124           0                               CONCLUSION:  Impression  RIGHT: There is <50% stenosis noted in the internal carotid artery  Plaque is heterogenous and irregular  Vertebral artery flow is antegrade  There is no significant subclavian artery disease  LEFT: There is 70-99% stenosis noted in the internal carotid artery  Plaque is heterogenous and irregular  The mid-distal internal carotid artery was poorly visualized  Vertebral artery flow is antegrade  There is no significant subclavian artery disease  Compared to previous study on 4/17/2020 [facility]), there is progression of disease on the left  Recommend repeat testing in 6 months as per protocol unless otherwise indicated    Internal carotid artery stenosis determination by consensus criteria from: sujatha Thurston al  Carotid Artery Stenosis: Gray-Scale and Doppler US Diagnosis - Society of Radiologists in [address] [place] [address] Drive, Radiology 2003; [phone]  SIGNATURE: Electronically Signed by: Thu Cheung on 2022-06-23 04:47:11 PM    Echo complete w/ contrast if indicated    Result Date: 6/16/2022  Narrative: [facility]  Left Ventricle: Left ventricular cavity size is normal  Wall thickness is moderately increased  There is moderate concentric hypertrophy  The left ventricular ejection fraction is 62%  Systolic function is normal  There is likely hypokinesis of the basal inferior wall  Although no other diagnostic regional wall motion abnormalities were identified, this possibility cannot be completely excluded on the basis of this study  Diastolic function is mildly abnormal, consistent with grade I (abnormal) relaxation    Left Atrium: The atrium is mildly dilated    Mitral Valve: There is mild annular calcification  There is trace regurgitation  Technically difficult study  If clinically indicated, consider repeat echo imaging with Definity to rule out hypertrophic cardiomyopathy (HCM)  VAS intra-op ultrasound CEA    Result Date: 6/18/2022  Narrative:  THE VASCULAR [facility]ENTER REPORT CLINICAL: Indications: Intra-operative ultrasound for Left carotid artery endarterectomy performed by Dr Jewels Ureña  Operative History: 2022-06-18 Left CEA 2019 Coronary stent placement CABG Risk Factors The patient has history of HTN, Hyperlipidemia and CAD  FINDINGS:  Left         Impression     PSV  EDV (cm/s)  Prox  ICA    Widely Patent   28           8  Dist CCA                     86          14  Ext Carotid                 134           9     CONCLUSION:  Impression: Intra-op evaluation shows no evidence of mural thrombus, intimal flap or significant residual disease in the endarterectomized carotid arteries    SIGNATURE: Electronically Signed by: Thu Cheung on 2022-06-18 03:37:15 PM        Review of Systems   Cardiovascular: Negative for chest pain, dyspnea on exertion, leg swelling, orthopnea, palpitations, paroxysmal nocturnal dyspnea and syncope  Gastrointestinal: Positive for dysphagia  Negative for diarrhea, nausea and vomiting  Neurological: Negative for dizziness and light-headedness  All other systems reviewed and are negative  Vitals:    07/11/22 1324   BP: 120/68   Pulse: 74     Vitals:    07/11/22 1324   Weight: 105 kg (232 lb)     Height: 6' 4" (193 cm)   Body mass index is 28 24 kg/m²  Physical Exam:  Physical Exam  Vitals reviewed  Constitutional:       General: He is not in acute distress  Appearance: He is well-developed  He is not diaphoretic  HENT:      Head: Normocephalic and atraumatic  Eyes:      Pupils: Pupils are equal, round, and reactive to light  Neck:      Vascular: No carotid bruit  Cardiovascular:      Rate and Rhythm: Normal rate and regular rhythm  Pulses:           Radial pulses are 2+ on the right side and 2+ on the left side  Dorsalis pedis pulses are 2+ on the right side and 2+ on the left side  Heart sounds: S1 normal and S2 normal  No murmur heard  Pulmonary:      Effort: Pulmonary effort is normal  No respiratory distress  Breath sounds: Normal breath sounds  No wheezing or rales  Abdominal:      General: There is no distension  Palpations: Abdomen is soft  Tenderness: There is no abdominal tenderness  Musculoskeletal:         General: Normal range of motion  Cervical back: Normal range of motion  Right lower leg: No edema  Left lower leg: No edema  Skin:     General: Skin is warm and dry  Findings: No erythema  Neurological:      General: No focal deficit present  Mental Status: He is alert and oriented to person, place, and time     Psychiatric:         Mood and Affect: Mood normal          Behavior: Behavior normal

## 2022-07-11 ENCOUNTER — OFFICE VISIT (OUTPATIENT)
Dept: CARDIOLOGY CLINIC | Facility: HOSPITAL | Age: 67
End: 2022-07-11
Payer: COMMERCIAL

## 2022-07-11 VITALS
WEIGHT: 232 LBS | BODY MASS INDEX: 28.25 KG/M2 | HEIGHT: 76 IN | DIASTOLIC BLOOD PRESSURE: 68 MMHG | HEART RATE: 74 BPM | SYSTOLIC BLOOD PRESSURE: 120 MMHG

## 2022-07-11 DIAGNOSIS — E78.5 DYSLIPIDEMIA: ICD-10-CM

## 2022-07-11 DIAGNOSIS — I10 ESSENTIAL HYPERTENSION: Chronic | ICD-10-CM

## 2022-07-11 DIAGNOSIS — I25.10 CORONARY ARTERY DISEASE INVOLVING NATIVE CORONARY ARTERY OF NATIVE HEART WITHOUT ANGINA PECTORIS: Primary | Chronic | ICD-10-CM

## 2022-07-11 DIAGNOSIS — I65.22 CAROTID ARTERY STENOSIS, SYMPTOMATIC, LEFT: ICD-10-CM

## 2022-07-11 DIAGNOSIS — Z95.1 S/P CABG (CORONARY ARTERY BYPASS GRAFT): ICD-10-CM

## 2022-07-11 DIAGNOSIS — Z98.890 STATUS POST CAROTID ENDARTERECTOMY: ICD-10-CM

## 2022-07-11 DIAGNOSIS — I73.9 CLAUDICATION (HCC): ICD-10-CM

## 2022-07-11 PROCEDURE — 99214 OFFICE O/P EST MOD 30 MIN: CPT | Performed by: PHYSICIAN ASSISTANT

## 2022-07-11 RX ORDER — CARVEDILOL 12.5 MG/1
12.5 TABLET ORAL 2 TIMES DAILY WITH MEALS
COMMUNITY
End: 2022-07-28 | Stop reason: SDUPTHER

## 2022-07-11 NOTE — PATIENT INSTRUCTIONS
Check your blood pressure at least once daily, at least an hour after you take your morning medications  Call the office ([phone]) in 1-2 weeks with updated blood pressure readings  Check repeat blood work (lipid panel and CMP) around Labor Day  This has to be fasting  Please confirm you are taking Carvedilol (Coreg) 12 5 mg twice daily

## 2022-07-18 ENCOUNTER — TELEPHONE (OUTPATIENT)
Dept: NEUROLOGY | Facility: CLINIC | Age: 67
End: 2022-07-18

## 2022-07-27 ENCOUNTER — HOSPITAL ENCOUNTER (OUTPATIENT)
Dept: NON INVASIVE DIAGNOSTICS | Facility: HOSPITAL | Age: 67
Discharge: HOME | End: 2022-07-27
Payer: COMMERCIAL

## 2022-07-27 DIAGNOSIS — I73.9 CLAUDICATION (HCC): ICD-10-CM

## 2022-07-27 PROCEDURE — 93925 LOWER EXTREMITY STUDY: CPT | Performed by: SURGERY

## 2022-07-27 PROCEDURE — 93923 UPR/LXTR ART STDY 3+ LVLS: CPT

## 2022-07-27 PROCEDURE — 93922 UPR/L XTREMITY ART 2 LEVELS: CPT | Performed by: SURGERY

## 2022-07-27 PROCEDURE — 93925 LOWER EXTREMITY STUDY: CPT

## 2022-07-28 ENCOUNTER — OFFICE VISIT (OUTPATIENT)
Dept: ENDOCRINOLOGY | Facility: CLINIC | Age: 67
End: 2022-07-28
Payer: COMMERCIAL

## 2022-07-28 VITALS
BODY MASS INDEX: 27.57 KG/M2 | DIASTOLIC BLOOD PRESSURE: 78 MMHG | HEART RATE: 68 BPM | WEIGHT: 226.4 LBS | SYSTOLIC BLOOD PRESSURE: 140 MMHG | HEIGHT: 76 IN

## 2022-07-28 DIAGNOSIS — I65.29 STENOSIS OF CAROTID ARTERY, UNSPECIFIED LATERALITY: ICD-10-CM

## 2022-07-28 DIAGNOSIS — E04.1 THYROID NODULE: Primary | ICD-10-CM

## 2022-07-28 DIAGNOSIS — I10 ESSENTIAL HYPERTENSION: ICD-10-CM

## 2022-07-28 DIAGNOSIS — R73.03 PREDIABETES: ICD-10-CM

## 2022-07-28 DIAGNOSIS — R73.03 PREDIABETES: Primary | ICD-10-CM

## 2022-07-28 DIAGNOSIS — J45.909 UNCOMPLICATED ASTHMA, UNSPECIFIED ASTHMA SEVERITY, UNSPECIFIED WHETHER PERSISTENT: ICD-10-CM

## 2022-07-28 DIAGNOSIS — E78.2 MIXED HYPERLIPIDEMIA: Chronic | ICD-10-CM

## 2022-07-28 DIAGNOSIS — E78.5 DYSLIPIDEMIA: Primary | ICD-10-CM

## 2022-07-28 DIAGNOSIS — I63.9 STROKE (HCC): ICD-10-CM

## 2022-07-28 DIAGNOSIS — E04.1 THYROID NODULE: ICD-10-CM

## 2022-07-28 PROBLEM — E04.2 MULTIPLE THYROID NODULES: Status: ACTIVE | Noted: 2022-07-28

## 2022-07-28 PROCEDURE — 99204 OFFICE O/P NEW MOD 45 MIN: CPT | Performed by: STUDENT IN AN ORGANIZED HEALTH CARE EDUCATION/TRAINING PROGRAM

## 2022-07-28 RX ORDER — LOSARTAN POTASSIUM AND HYDROCHLOROTHIAZIDE 12.5; 5 MG/1; MG/1
1 TABLET ORAL DAILY
Qty: 90 TABLET | Refills: 3 | Status: SHIPPED | OUTPATIENT
Start: 2022-07-28 | End: 2022-08-27

## 2022-07-28 RX ORDER — EZETIMIBE 10 MG/1
10 TABLET ORAL DAILY
Qty: 90 TABLET | Refills: 3 | Status: SHIPPED | OUTPATIENT
Start: 2022-07-28

## 2022-07-28 RX ORDER — ASPIRIN 81 MG/1
81 TABLET ORAL DAILY
Qty: 90 TABLET | Refills: 3 | Status: SHIPPED | OUTPATIENT
Start: 2022-07-28

## 2022-07-28 RX ORDER — CARVEDILOL 12.5 MG/1
12.5 TABLET ORAL 2 TIMES DAILY WITH MEALS
Qty: 180 TABLET | Refills: 3 | Status: SHIPPED | OUTPATIENT
Start: 2022-07-28

## 2022-07-28 RX ORDER — ATORVASTATIN CALCIUM 80 MG/1
80 TABLET, FILM COATED ORAL EVERY EVENING
Qty: 90 TABLET | Refills: 3 | Status: SHIPPED | OUTPATIENT
Start: 2022-07-28 | End: 2022-08-27

## 2022-07-28 NOTE — ASSESSMENT & PLAN NOTE
Presently on statin therapy  Goal LDL less than 70    Patient already arranged for follow-up lipid panel

## 2022-07-28 NOTE — PROGRESS NOTES
Luis Angel Cagle 79 y o  male MRN: [id number]    Encounter: [id number]      Assessment/Plan     Problem List Items Addressed This Visit        Endocrine    Thyroid nodule - Primary     I discussed the natural history, prevalence and recommended evaluation of thyroid nodules as outlined by the SHAHRIAR and AACE professional societies  The risk of malignancy in most thyroid nodules is low and the course of thyroid cancer in general is indolent, slow growing and responds well to therapy  Recommendations for biopsy are therefore based on the patient's risk factors for thyroid cancer, the size and ultrasonographic appearance of the nodules and the life expectancy and comorbidities of the patient  Thyroid function labs wnl  Will request dedicated thyroid US  Relevant Orders    US thyroid       Other    Mixed hyperlipidemia (Chronic)     Presently on statin therapy  Goal LDL less than 70  Patient already arranged for follow-up lipid panel         Prediabetes     Reviewed first-line therapy being comprehensive lifestyle changes  Pam Dykes be referred to Diabetes Education for medical nutrition therapy counseling  We will also plan to monitor his A1c test which she will obtain prior to his next follow-up visit  Relevant Orders    Basic metabolic panel Lab Collect    HEMOGLOBIN A1C W/ EAG ESTIMATION Lab Collect    Ambulatory referral to Diabetic Education        CC: Thyroid nodule, prediabetes    History of Present Illness     HPI:    Pam Dykes presents today for evaluation of thyroid incidentaloma  He is joined by his wife who is assisting with elements of the history  Myles's recent medical history has been complicated by severe carotid stenosis status post carotid endarterectomy discovered evaluation for stroke  He has roughly 3 weeks postop  During his carotid stenosis evaluation he was discovered to have a thyroid nodule on CT imaging  He has not yet had a dedicated thyroid ultrasound    He denies any history of thyroid disorder  His thyroid function tests were within normal limits  He denies any family history of thyroid cancer  He denies any personal history of exposure to radiation to the head or neck as a child or adolescent  He denies any compressive neck symptoms  Marilia Antunez does report feeling tired and lethargic throughout the day since his procedure  He also reports weight loss of over 20 lb  He states this weight loss was delivered on account to changes in diet  He did have a large carotid plaque removed from his artery and his surgery was longer than originally anticipated  Marilia Antunez is motivated to make positive lifestyle changes in order to reduce his future cardiovascular risk  Marilia Antunez does have a history of prediabetes, which has been stable  He is not on any antidiabetic pharmacotherapy  For hyperlipidemia he takes Lipitor 80 mg daily  He reports being more compliant with Lipitor that he was in the past   He is prescribed ezetimibe as well but he is not presently taking that therapy  For hypertension he takes losartan-hydrochlorothiazide 50-12 5 mg daily and carvedilol 12 5 bid  Review of Systems   Constitutional: Positive for fatigue and unexpected weight change (Weight loss)  HENT: Negative for trouble swallowing and voice change  Eyes: Negative for photophobia and visual disturbance  Respiratory: Negative for shortness of breath  Cardiovascular: Negative for chest pain and leg swelling  Gastrointestinal: Negative for nausea and vomiting  Endocrine: Negative for polydipsia and polyuria  Neurological: Negative for tremors  Psychiatric/Behavioral: Negative for agitation and behavioral problems         Historical Information   Past Medical History:   Diagnosis Date    Asthma     [facility] )     Coronary artery disease     HTN (hypertension)     Hyperlipidemia     Myocardial infarction ([place] [address] ) 02/2019    Stroke ([place] [address] )     TIA (transient ischemic attack)      Past Surgical History:   Procedure Laterality Date    APPENDECTOMY      CARDIAC CATHETERIZATION  04/17/2020    RCA: 80% distal lesion  LCX/OM2 stent placement  LAD with 70-80% lesion at D1 bifurcation   CARDIAC CATHETERIZATION  2019    OM2 and proximal LCX stenting   CATARACT EXTRACTION Right     CORONARY ARTERY BYPASS GRAFT      CORONARY STENT PLACEMENT  2019    OM2 and proximal LCX stenting    AK THROMBOENDARTECTMY NECK,NECK INCIS Left 6/18/2022    Procedure: ENDARTERECTOMY ARTERY CAROTID WITH PATCH ANGIOPLASTY;  Surgeon: Nikki Carcamo DO;  Location: [facility];  Service: Vascular     Social History   Social History     Substance and Sexual Activity   Alcohol Use Never     Social History     Substance and Sexual Activity   Drug Use Never     Social History     Tobacco Use   Smoking Status Never Smoker   Smokeless Tobacco Never Used     Family History:   Family History   Problem Relation Age of Onset    Heart disease Mother     Kidney disease Mother     Heart disease Father        Meds/Allergies   Current Outpatient Medications   Medication Sig Dispense Refill    albuterol (PROVENTIL HFA,VENTOLIN HFA) 90 mcg/act inhaler Inhale 2 puffs every 6 (six) hours as needed for wheezing 1 Inhaler 3    aspirin (ECOTRIN LOW STRENGTH) 81 mg EC tablet Take 1 tablet (81 mg total) by mouth daily 90 tablet 3    atorvastatin (LIPITOR) 80 mg tablet Take 1 tablet (80 mg total) by mouth every evening 90 tablet 3    carvedilol (COREG) 12 5 mg tablet Take 1 tablet (12 5 mg total) by mouth 2 (two) times a day with meals 180 tablet 3    ezetimibe (ZETIA) 10 mg tablet Take 1 tablet (10 mg total) by mouth daily Take one tablet by mouth once daily at night   90 tablet 3    losartan-hydrochlorothiazide (HYZAAR) 50-12 5 mg per tablet Take 1 tablet by mouth daily 90 tablet 3    nitroglycerin (NITROSTAT) 0 4 mg SL tablet Place 1 tablet (0 4 mg total) under the tongue every 5 (five) minutes as needed for chest pain 25 tablet 1    Symbicort 160-4 5 MCG/ACT inhaler Inhale 2 puffs 2 (two) times a day Rinse mouth after use  10 2 g 0    ticagrelor (BRILINTA) 90 MG Take 1 tablet (90 mg total) by mouth every 12 (twelve) hours 180 tablet 3     No current facility-administered medications for this visit  Allergies   Allergen Reactions    Eggs Or Egg-Derived Products - Food Allergy Shortness Of Breath       Objective   Vitals: Blood pressure 140/78, pulse 68, height 6' 4" (1 93 m), weight 103 kg (226 lb 6 4 oz)  Physical Exam  Vitals reviewed  Constitutional:       Appearance: Normal appearance  HENT:      Head: Normocephalic and atraumatic  Nose: Nose normal    Eyes:      General: No scleral icterus  Conjunctiva/sclera: Conjunctivae normal    Neck:      Thyroid: No thyroid mass, thyromegaly or thyroid tenderness  Comments: Well healing surgical wound on anterior left neck from [facility]  Cardiovascular:      Rate and Rhythm: Normal rate and regular rhythm  Pulses: Normal pulses  Pulmonary:      Effort: Pulmonary effort is normal  No respiratory distress  Abdominal:      Palpations: Abdomen is soft  Tenderness: There is no abdominal tenderness  Lymphadenopathy:      Cervical: No cervical adenopathy  Skin:     General: Skin is warm and dry  Neurological:      General: No focal deficit present  Mental Status: He is alert  Comments: No tremor to outstretched hands   Psychiatric:         Mood and Affect: Mood normal          Behavior: Behavior normal          The history was obtained from the review of the chart, patient and family      Lab Results:   Lab Results   Component Value Date/Time    TSH 3RD GENERATON 2 930 06/16/2022 08:53 AM     Lab Results   Component Value Date    SODIUM 139 06/22/2022    K 3 2 (L) 06/22/2022     06/22/2022    CO2 30 06/22/2022    BUN 28 (H) 06/22/2022    CREATININE 1 26 06/22/2022    GLUC 125 06/22/2022    CALCIUM 9 1 06/22/2022 Lab Results   Component Value Date    CHOLESTEROL 207 (H) 06/16/2022    CHOLESTEROL 211 (H) 06/16/2022    CHOLESTEROL 211 (H) 06/15/2022     Lab Results   Component Value Date    HDL 47 06/16/2022    HDL 47 06/16/2022    HDL 42 06/15/2022     Lab Results   Component Value Date    TRIG 152 (H) 06/16/2022    TRIG 142 06/16/2022    TRIG 230 (H) 06/15/2022     Lab Results   Component Value Date    NONHDLC 160 06/16/2022    [place] 125 03/22/2021     Lab Results   Component Value Date    HGBA1C 6 0 (H) 06/16/2022         Imaging Studies:      6/15/2022  CTA Neck and brain w and wo contrast    SOFT TISSUES OF THE NECK: Incidental discovery of one or more thyroid nodule(s) measuring more than 1 5 cm and without suspicious features is noted in this patient who is above 28years old; according to guidelines published in the February 2015 white   paper on incidental thyroid nodules in the Journal of the [employer] of Radiology Sonja Garnica), further characterization with thyroid ultrasound is recommended  I have personally reviewed pertinent reports  Portions of the record may have been created with voice recognition software  Occasional wrong word or "sound a like" substitutions may have occurred due to the inherent limitations of voice recognition software  Read the chart carefully and recognize, using context, where substitutions have occurred

## 2022-07-28 NOTE — ASSESSMENT & PLAN NOTE
Reviewed first-line therapy being comprehensive lifestyle changes  Yana Goodwin be referred to Diabetes Education for medical nutrition therapy counseling  We will also plan to monitor his A1c test which she will obtain prior to his next follow-up visit

## 2022-07-28 NOTE — ASSESSMENT & PLAN NOTE
I discussed the natural history, prevalence and recommended evaluation of thyroid nodules as outlined by the SHAHRIAR and AACE professional societies  The risk of malignancy in most thyroid nodules is low and the course of thyroid cancer in general is indolent, slow growing and responds well to therapy  Recommendations for biopsy are therefore based on the patient's risk factors for thyroid cancer, the size and ultrasonographic appearance of the nodules and the life expectancy and comorbidities of the patient  Thyroid function labs wnl  Will request dedicated thyroid US

## 2022-08-03 ENCOUNTER — TELEPHONE (OUTPATIENT)
Dept: FAMILY MEDICINE CLINIC | Facility: CLINIC | Age: 67
End: 2022-08-03

## 2022-08-03 NOTE — TELEPHONE ENCOUNTER
[facility], [address]       Reason for documentation: Patient was flagged by Third Party Payer and/or internal report as being due for a refill on the following medications:    Losartan/Hydrochlorothiazide; pharmacy fill rate 0 67% (goal > 80%); refill due date 07/21/22      Outcome:  Informed patient he has a refill on his Losartan/Hydrochlorothiazide and he stated he will go pick it up from his pharmacy      Adherence Assessment:     Misses doses:  no  # of missed doses in the past week: 0    # of times per day patient takes meds: 1    Use of supportive adherence tools:No    Med cost concerns: no

## 2022-08-03 NOTE — TELEPHONE ENCOUNTER
Note was written by John Guido, pharmacy Student  Gentry Payan, Pharmacist was present during the patient appointment and was available as needed  Anil Muñoz Pharmacist  have reviewed and discussed the case with the student and agree with the findings and plan as documented      Pharmacist Tracking Tool  Reason For Outreach: Population Health Pharmacist  Demographics:  Intervention Method: Phone  Type of Intervention: New  Topics Addressed: Quality measures  Pharmacologic Interventions: N/A  Non-Pharmacologic Interventions: Adherence addressed  Time:  Direct Patient Care: 10 mins  Care Coordination: 10 mins  Recommendation Recipient: Patient/Caregiver  Outcome: Accepted

## 2022-08-05 ENCOUNTER — TELEPHONE (OUTPATIENT)
Dept: NEUROLOGY | Facility: CLINIC | Age: 67
End: 2022-08-05

## 2022-08-15 ENCOUNTER — VBI (OUTPATIENT)
Dept: ADMINISTRATIVE | Facility: OTHER | Age: 67
End: 2022-08-15

## 2022-10-04 ENCOUNTER — TELEPHONE (OUTPATIENT)
Dept: VASCULAR SURGERY | Facility: CLINIC | Age: 67
End: 2022-10-04

## 2022-10-04 NOTE — TELEPHONE ENCOUNTER
Attempted to contact patient to schedule appointment(s) listed below  Requested patient call [phone] option 3 to schedule appointment(s)  Per office note from Eduardo Owusu on 06/30/22, pt is due for OV with [facility]) after CV scheduled for 10/03/22  Surgeon - [facility] // Go Bledsoe (NPI: [id number])  Date of Surgery - 06/18/22  All appointments must be scheduled by, 06/18/23  !!!! Patient must be scheduled for their 9-month office visit before the follow-up window close date !!!!    [facility]/ [facility] VQI Protocol  patients must be scheduled for the following    [x] 3-month Doppler - SCHEDULED 10/05/22 3 MO OV DUE AFTER    [x] 9-month Doppler Expected - due on or after 04/07/23    [x] 9-month OV after Doppler - due on or after 04/07/23          Scheduling Reminders  1  Insurance requires, 9-month doppler to be scheduled 6-months and 2-days after the 3-month doppler  2  Appointment notes MUST be entered in the following format:  a  VQI SmartPhrase NEEDED - VQI LTFU - ([facility] or [facility]) (Date of Surgery) (Surgeon's Initials) - CV Duplex (Date of Doppler)   3  If unable to schedule, a recall MUST be entered  >>Please route this encounter to Yasemin Ramirez, Gayle Munoz, and Esau Marin  <<

## 2022-10-18 ENCOUNTER — TELEPHONE (OUTPATIENT)
Dept: NEUROLOGY | Facility: CLINIC | Age: 67
End: 2022-10-18

## 2022-10-18 NOTE — TELEPHONE ENCOUNTER
Called and poke to pt to confirm upcoming appt on 10/31/22  Pt stated he will not be able to make it due to working in [place] that day  Pt requested I r/s his appt to next available and give him a call  to new appt date w/ details  Pt scheduled for next available on 11/22/22 @ 12:30 p m

## 2022-11-02 ENCOUNTER — TELEPHONE (OUTPATIENT)
Dept: CARDIOLOGY CLINIC | Facility: HOSPITAL | Age: 67
End: 2022-11-02

## 2022-11-02 DIAGNOSIS — Z79.899 ON ANGIOTENSIN RECEPTOR BLOCKERS (ARB): Primary | ICD-10-CM

## 2022-11-02 NOTE — TELEPHONE ENCOUNTER
Due elevated BP in the 769 systolic range, will increase losartan/HCTZ to 2 tablets daily with BMP in 1 week  Patient will also notify office in 1 week with updated readings  Appointment with Dr Carlos Lincoln at the end of the month scheduled

## 2022-11-03 ENCOUNTER — HOSPITAL ENCOUNTER (OUTPATIENT)
Dept: NON INVASIVE DIAGNOSTICS | Facility: HOSPITAL | Age: 67
Discharge: HOME | End: 2022-11-03

## 2022-11-03 DIAGNOSIS — I65.29 STENOSIS OF CAROTID ARTERY, UNSPECIFIED LATERALITY: ICD-10-CM

## 2022-11-08 ENCOUNTER — TELEPHONE (OUTPATIENT)
Dept: NEUROLOGY | Facility: CLINIC | Age: 67
End: 2022-11-08

## 2022-11-08 NOTE — TELEPHONE ENCOUNTER
Attempted to contact pt to remind of upcoming appt on 11/22/22  No answer and mailbox is currently full, unable to leave a message

## 2022-11-22 ENCOUNTER — OFFICE VISIT (OUTPATIENT)
Dept: NEUROLOGY | Facility: CLINIC | Age: 67
End: 2022-11-22

## 2022-11-22 VITALS
RESPIRATION RATE: 18 BRPM | WEIGHT: 229.5 LBS | BODY MASS INDEX: 27.95 KG/M2 | HEART RATE: 60 BPM | SYSTOLIC BLOOD PRESSURE: 155 MMHG | TEMPERATURE: 97.4 F | DIASTOLIC BLOOD PRESSURE: 74 MMHG | HEIGHT: 76 IN

## 2022-11-22 DIAGNOSIS — I65.29 STENOSIS OF CAROTID ARTERY, UNSPECIFIED LATERALITY: ICD-10-CM

## 2022-11-22 DIAGNOSIS — E78.2 MIXED HYPERLIPIDEMIA: Chronic | ICD-10-CM

## 2022-11-22 DIAGNOSIS — I10 ESSENTIAL HYPERTENSION: Chronic | ICD-10-CM

## 2022-11-22 DIAGNOSIS — R49.9 CHANGE IN VOICE: ICD-10-CM

## 2022-11-22 DIAGNOSIS — Z95.1 S/P CABG (CORONARY ARTERY BYPASS GRAFT): ICD-10-CM

## 2022-11-22 DIAGNOSIS — G47.33 OSA (OBSTRUCTIVE SLEEP APNEA): ICD-10-CM

## 2022-11-22 DIAGNOSIS — I48.91 ATRIAL FIBRILLATION, UNSPECIFIED TYPE (HCC): ICD-10-CM

## 2022-11-22 DIAGNOSIS — R73.03 PREDIABETES: ICD-10-CM

## 2022-11-22 DIAGNOSIS — Z86.73 HISTORY OF STROKE: Primary | ICD-10-CM

## 2022-11-22 PROBLEM — I63.9 ACUTE CVA (CEREBROVASCULAR ACCIDENT) (HCC): Status: RESOLVED | Noted: 2022-06-16 | Resolved: 2022-11-22

## 2022-11-22 NOTE — PATIENT INSTRUCTIONS
Restart aspirin 81mg daily  Continue Brillinta 90mg twice a day and Lipitor 80mg daily  Continue to follow with vascular surgery and cardiology as indicated  Referral to ENT  Referral to sleep medicine  Heart healthy diet and routine exercise as tolerated  Continue to follow with PCP for management of blood pressure, cholesterol and blood sugar    Follow up in 4-6 months or sooner if needed     If you experience any facial droop, weakness on one side of the body, speech or swallowing difficulty, painless loss of vision in one eye, double vision, vertigo that does not resolve quickly/imbalance, go to the ER/call 911

## 2022-11-22 NOTE — PROGRESS NOTES
Patient ID: Viktoria Shaikh is a 79 y o  male  Assessment:  79year old male with ABDULAZIZ, HTN, HLD, MI/CAD s/p CABG and stents, PAF, who presented to [address] on 6/15/2022 with transient neurologic deficits  He had two isolated episodes of neurologic symptoms: The first episode manifested as heaviness/numbness of the RLE, lasting about 1 hour  The second episode consisted of total R arm numbness, dysarthria, and word finding difficulty, lasting for 15 min  BP on arrival 220/110  CTH noted age indeterminate infarcts of the left caudate and left thalamus  CTA noted significant atherosclerotic disease of the left proximal ICA (90%)  He was transferred to [facility]  MRI brain ultimately revealed multifocal infarctions in the left frontal and left occipital lobes, within the L MCA territory  Symptomatic L ICA stenosis was suspected and he underwent L CEA on 6/18/2022  ECHO with EF 62%, moderate concentric hypertrophy, likely hypokinesis of the basilar inferior wall, grade 1 diastolic dysfunction, mild left atrial dilation  He was non-compliant with ASA at baseline  Following CEA, was placed on ASA 81mg daily, Brillinta 90mg BID and Lipitor 80mg daily  He denies any new neurologic symptoms to indicate recurrent TIA/CVA  He reports stopping ASA recently as he "saw something that said the FDA or CDC does not recommend people take aspirin anymore, it could be bad"  We discussed that he likely saw the info regarding there being no benefit to older adults taking ASA routinely in order to prevent heart disease and stroke  Extensively discussed the difference between taking ASA for prevention without having heart disease or stroke, than being prescribed this in order to treat recent cardiac or neurovascular event  Discussed with him that vascular surgery has advised he remain on DAPT and he should re-start ASA in addition to his Brillinta    When he sees vascular later this week, can discuss their plan for DAPT in regards to recent  CEA  He should also discuss DAPT with cardiology due to history of CABG and stents  From a stroke prevention standpoint, would not require DAPT longterm  He also has a history of Afib listed  He reports he had Afib occurrence following his MI when he was in the hospital   He never had a long term rhythm monitor to check for any recurrence  He has left atrial dilation on his ECHO  It may be beneficial to consider long term rhythm monitor such as Zio or ILR to ensure no underlying Afib and need for anticoagulation  I have asked him to discuss this with his cardiologist next week  He was previously following with an out of network cardiologist (when the Afib occurred) and since recent hospitalization is now seeing Malaika Stanford cardiology  He has diagnosis of ABDULAZIZ, prescribed CPAP, but does not use it  Discussed importance of treating ABDULAZIZ given his history of MI, CVA, Afib and difficult to control HTN  I provided a referral to sleep medicine, as he says he cannot tolerate the CPAP and wants to discuss other treatment options  We extensively discussed the need to control his cardiovascular risk factors under the direction of his PCP including blood pressure with goal BP less than 130/80 routinely, cholesterol with goal LDL less than 70, as well as appropriate glycemic control  He notes since the CEA he has noticed some change in his voice  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[employer] , and although he 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 a [employer], 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  He notices that he has more trouble with the high notes and his voice is a bit different  He would like someone to evaluate this  I am providing a referral to ENT      Plan:  -for ongoing stroke prevention patient will continue aspirin 81 mg daily, Brilinta 90 mg b i d , Lipitor 80 mg daily along with appropriate blood pressure and glycemic control  -will defer timing for DAPT to vascular surgery and Cardiology given his recent CEA and hx of CABG/SHERIF  He would not need DAPT long-term from a stroke prevention standpoint  -continue to follow with Cardiology as indicated  Would consider long-term rhythm monitoring due to his history of AFib and has never had a monitor to check for recurrence  -continue to follow with vascular surgery as indicated for monitoring of his carotids  -continue to follow with PCP for management of blood pressure, lipids and blood sugar  -encouraged home monitoring of blood pressure with goal BP less than 130/80 routinely  -referral to Sleep Medicine to discuss treatment options for ABDULAZIZ as he has difficulty tolerating his CPAP  -referral to ENT due to reports of a voice changes following his CEA  -heart healthy diet and routine exercise were encouraged  -follow-up in 4-6 months or sooner if needed    Signs and symptoms of stroke were discussed with the patient and included in the AVS today         Diagnoses and all orders for this visit:    History of stroke    Stenosis of carotid artery, unspecified laterality    S/P CABG (coronary artery bypass graft)    Prediabetes    Mixed hyperlipidemia    Essential hypertension    Change in voice  -     Ambulatory Referral to Otolaryngology; Future    ABDULAZIZ (obstructive sleep apnea)  -     Ambulatory Referral to Sleep Medicine; Future    Atrial fibrillation, unspecified type [facility])           Subjective:    GOPI    Vania Robledo is a 79 y o  male with hx of AFib 2019 with no recurrence not on AC, CAD/MI, CABG/SHERIF, HTN, HLD, TIA, ABDULAZIZ, who presents today for a hospital follow up  Patient presented to the ED at [facility] on 6/15/22 with the complaint of transient neurologic symptoms  He reported to the ED that the day prior, he experienced right leg numbness below the knee lasting 1 hour and his blood pressure was elevated  His cardiologist prescribed carvedilol for the patient to take due to markedly elevated blood pressures  Patient was not sure whether he took this medication   1 hour prior to arrival, patient developed difficulty with speech and right arm numbness  Symptoms lasted for 15 minutes and gradually improved  The symptoms were completely resolved at time of ED eval, however, patient was hypertensive with blood pressure of 220/110  He was noted to be a very poor historian  He first reported being on ASA, and a med that started with a “P” and when asked if it was Plavix, he said yes, and then also said he was on Coumadin (he was not on coumadin)  On record reviewed, patient had a neurology appointment at the [address] [place] [address] on 06/13/2022  Per medical record, his neurologist evaluated him for right leg numbness that comes and goes with plan to obtain brain MRI, A1c and lipid profile  CTA was completed in the ED, which showed age indeterminate infarcts in the left caudate and left thalamus, mild chronic microangiopathic changes, moderate stenosis within the left M2 and perisylvian branch of the left MCA  There was significant atherosclerotic disease of left proximal cervical internal carotid artery resulting in severe stenosis/near total occlusion just distal to the ICA origin (90%)  The patient was transferred to [facility] for further evaluation and management  Cardiology, vascular and neurology were consulted  On neurology eval, they reviewed extensive cardiac hx with hx of CABG and SHERIF, reportedly he was on Brilinta and Asa, then Brilinta was discontinued  He had not been compliant with his ASA  he reported multiple transient neurologic symptoms for several days leading up to presentation including right lower extremity weakness and numbness from the knee down, heaviness  This lasted 90 minutes and resolved  He reported the next day, he had a transient episode of the left upper extremity numbness and weakness, with inability to get his words out, which lasted a few minutes and resolved  After the last episode is when he came to the hospital for evaluation   There was no other associated symptoms with the above  A1C 6 0,   It was felt that symptomatic L ICA stenosis was causing TIAs  Patient was non-compliant with antiplatelet regimen at home  DAPT was resumed and increased Atorvastatin to 80mg  He was evaluated by vascular surgery and L CEA was advised  MRI brain demonstrated small multifocal acute infarcts in the left frontal and left occipital lobes, as well as chronic infarcts seen in the left caudate head and left thalamus  ECHO with EF 62%, moderate concentric hypertrophy, likely hypokinesis of the basilar inferior wall, grade 1 diastolic dysfunction, mild left atrial dilation  He underwent L CEA on 6/18/22  He was discharged on ASA 81mg daily, Brillinta 90mg BID and Lipitor 80mg daily  Per neurology, DAPT instructions deferred to vascular following CEA  Today, patient reports he is overall doing well  He denies any new neurologic symptoms  He does report he has been having some chest discomfort/tightness over the last few weeks and BP was running high  He did contact his cardiologist who increased one of his BP meds  He has an appt with cardiology next week  He reports he discontinued ASA because "I read where the FDA or CDC was not recommending people take aspirin anymore  It could cause problems or something"  He did not consult with any of his medical providers about this  He is still taking Brillinta, statin  He notes since his L CEA, he notices that when he sings, he has some trouble with higher notes and voice has changed a bit  He is a  (classical guitar and viola), 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, and has noticed changes in his voice  Wondering if someone can take a look at that for him  He denies any difficulty swallowing or trouble breathing  No vision changes  No new paresthesias, weakness  Denies headaches  He admits he has ABDULAZIZ, is prescribed a CPAP, but cannot tolerate it    This was Rx'd by PCP, has not seen sleep medicine  I asked if he recalled details of prior Afib dx  He reports he experienced Afib after his MI while in the hospital   He says he never had a long term rhythm monitor to check for this afterwards  He was never on [facility] [place] that he is aware of  The following portions of the patient's history were reviewed and updated as appropriate: current medications, past family history, past medical history, past social history, past surgical history and problem list        Objective:    Blood pressure 155/74, pulse 60, temperature (!) 97 4 °F (36 3 °C), temperature source Tympanic, resp  rate 18, height 6' 4" (1 93 m), weight 104 kg (229 lb 8 oz)  Physical Exam  Constitutional:       Appearance: Normal appearance  HENT:      Head: Normocephalic and atraumatic  Eyes:      Extraocular Movements: EOM normal       Pupils: Pupils are equal, round, and reactive to light  Neurological:      Mental Status: He is alert  Motor: Motor strength is normal       Deep Tendon Reflexes:      Reflex Scores:       Bicep reflexes are 2+ on the right side and 2+ on the left side  Brachioradialis reflexes are 2+ on the right side and 2+ on the left side  Patellar reflexes are 2+ on the right side and 2+ on the left side  Achilles reflexes are 1+ on the right side and 1+ on the left side  Psychiatric:         Mood and Affect: Mood normal          Speech: Speech normal          Behavior: Behavior normal          Neurological Exam  Mental Status  Alert  Oriented to person, place, time and situation  Speech is normal  Language is fluent with no aphasia  Attention and concentration are normal     Cranial Nerves  CN II: Visual fields full to confrontation  CN III, IV, VI: Extraocular movements intact bilaterally  Pupils equal round and reactive to light bilaterally  CN V: Facial sensation is normal   CN VII: Full and symmetric facial movement    CN VIII: Hearing is normal   CN IX, X: Palate elevates symmetrically  CN XI: Shoulder shrug strength is normal   CN XII: Tongue midline without atrophy or fasciculations  Motor   Normal muscle tone  Strength is 5/5 throughout all four extremities  Sensory  Light touch is normal in upper and lower extremities  Reflexes                                            Right                      Left  Brachioradialis                    2+                         2+  Biceps                                 2+                         2+  Patellar                                2+                         2+  Achilles                                1+                         1+    Coordination  Right: Finger-to-nose normal Left: Finger-to-nose normal     Gait  Casual gait is normal including stance, stride, and arm swing  ROS:    Review of Systems   Constitutional: Negative for chills and fever  HENT: Negative for ear pain and sore throat  Eyes: Negative for pain and visual disturbance  Respiratory: Positive for shortness of breath  Negative for cough  Cardiovascular: Positive for chest pain (tight)  Negative for palpitations  Gastrointestinal: Negative for abdominal pain and vomiting  Genitourinary: Negative for dysuria and hematuria  Musculoskeletal: Negative for arthralgias and back pain  Skin: Negative for color change and rash  Neurological: Negative for seizures and syncope  All other systems reviewed and are negative      I personally reviewed and updated the ROS as appropriate

## 2022-11-25 ENCOUNTER — APPOINTMENT (OUTPATIENT)
Dept: LAB | Facility: HOSPITAL | Age: 67
End: 2022-11-25

## 2022-11-25 ENCOUNTER — OFFICE VISIT (OUTPATIENT)
Dept: VASCULAR SURGERY | Facility: HOSPITAL | Age: 67
End: 2022-11-25

## 2022-11-25 VITALS
HEIGHT: 76 IN | SYSTOLIC BLOOD PRESSURE: 142 MMHG | BODY MASS INDEX: 28.49 KG/M2 | WEIGHT: 234 LBS | DIASTOLIC BLOOD PRESSURE: 84 MMHG | HEART RATE: 60 BPM

## 2022-11-25 DIAGNOSIS — Z79.899 ON ANGIOTENSIN RECEPTOR BLOCKERS (ARB): ICD-10-CM

## 2022-11-25 DIAGNOSIS — I65.22 STENOSIS OF LEFT CAROTID ARTERY: ICD-10-CM

## 2022-11-25 DIAGNOSIS — Z98.890 S/P CAROTID ENDARTERECTOMY: Primary | ICD-10-CM

## 2022-11-25 LAB
ANION GAP SERPL CALCULATED.3IONS-SCNC: 3 MMOL/L (ref 4–13)
BUN SERPL-MCNC: 22 MG/DL (ref 5–25)
CALCIUM SERPL-MCNC: 8.7 MG/DL (ref 8.3–10.1)
CHLORIDE SERPL-SCNC: 104 MMOL/L (ref 96–108)
CO2 SERPL-SCNC: 33 MMOL/L (ref 21–32)
CREAT SERPL-MCNC: 1.19 MG/DL (ref 0.6–1.3)
GFR SERPL CREATININE-BSD FRML MDRD: 62 ML/MIN/1.73SQ M
GLUCOSE P FAST SERPL-MCNC: 108 MG/DL (ref 65–99)
POTASSIUM SERPL-SCNC: 3.6 MMOL/L (ref 3.5–5.3)
SODIUM SERPL-SCNC: 140 MMOL/L (ref 135–147)

## 2022-11-25 NOTE — ASSESSMENT & PLAN NOTE
Murali Noel returns to the office status post left carotid endarterectomy back in June  His surveillance carotid duplex demonstrates a widely patent endarterectomy site  The right side is less than 50%  He should continue on his current medical regimen  Will continue surveillance with 6 month duplex

## 2022-11-25 NOTE — PROGRESS NOTES
Assessment/Plan:    Carotid artery stenosis  Anita Cruz returns to the office status post left carotid endarterectomy back in June  His surveillance carotid duplex demonstrates a widely patent endarterectomy site  The right side is less than 50%  He should continue on his current medical regimen  Will continue surveillance with 6 month duplex  Diagnoses and all orders for this visit:    S/P carotid endarterectomy  -     VAS carotid complete study; Future    Stenosis of left carotid artery          Subjective:      Patient ID: Daniel Marti is a 79 y o  male  Pt presents to rev CV done on 11/3/22 s/p L CEA 6/18/22 (Dr Sujey Beach)  Pt denies TIA or stroke like symptoms  Anita Cruz returns to the office to review results of his surveillance carotid duplex status post left carotid endarterectomy back in June  The following portions of the patient's history were reviewed and updated as appropriate: allergies, current medications, past family history, past medical history, past social history, past surgical history and problem list     Review of Systems   Constitutional: Negative  HENT: Negative  Eyes: Negative  Respiratory: Negative  Cardiovascular: Negative  Gastrointestinal: Negative  Endocrine: Negative  Genitourinary: Negative  Musculoskeletal: Negative  Skin: Negative  Allergic/Immunologic: Negative  Neurological: Negative  Hematological: Negative  Psychiatric/Behavioral: Negative  I have personally reviewed the ROS entered by MA and agree as documented  Objective:      /84 (BP Location: Right arm, Patient Position: Sitting, Cuff Size: Standard)   Pulse 60   Ht 6' 4" (1 93 m)   Wt 106 kg (234 lb)   BMI 28 48 kg/m²          Physical Exam  Constitutional:       General: He is not in acute distress  Appearance: He is well-developed and well-nourished  HENT:      Head: Normocephalic and atraumatic     Eyes:      General: No scleral icterus  Extraocular Movements: EOM normal       Conjunctiva/sclera: Conjunctivae normal    Neck:      Trachea: No tracheal deviation  Cardiovascular:      Rate and Rhythm: Normal rate  Pulmonary:      Effort: Pulmonary effort is normal       Breath sounds: Normal breath sounds  Abdominal:      General: There is no distension  Palpations: Abdomen is soft  There is no mass (no appreciable aortic pulsation/aneurysm)  Tenderness: There is no abdominal tenderness  There is no guarding or rebound  Musculoskeletal:         General: Normal range of motion  Cervical back: Normal range of motion and neck supple  Skin:     General: Skin is warm and dry  Neurological:      Mental Status: He is alert and oriented to person, place, and time  Psychiatric:         Mood and Affect: Mood and affect and mood normal          Behavior: Behavior normal          Thought Content: Thought content normal          Judgment: Judgment normal        carotid duplex:  CONCLUSION:     Impression  RIGHT:  There is <50% stenosis noted in the internal carotid artery  Plaque is  heterogenous and irregular  Vertebral artery flow is antegrade  There is no significant subclavian artery  disease  LEFT:  Widely patent internal carotid artery and endarterectomy site  Vertebral artery flow is antegrade  There is no significant subclavian artery  disease

## 2022-11-29 PROBLEM — Z98.890 HISTORY OF LEFT-SIDED CAROTID ENDARTERECTOMY: Status: ACTIVE | Noted: 2022-11-29

## 2022-11-29 PROBLEM — I48.0 PAROXYSMAL ATRIAL FIBRILLATION (HCC): Status: ACTIVE | Noted: 2022-06-16

## 2022-11-29 PROBLEM — E78.5 DYSLIPIDEMIA: Status: ACTIVE | Noted: 2020-12-10

## 2022-11-29 NOTE — PROGRESS NOTES
Cardiology Follow Up    Indigo Hernández  1955  [id number]  [facility] Reid  [address]  [phone]    1  Coronary artery disease involving native coronary artery of native heart without angina pectoris  Echo stress test, exercise      2  S/P CABG (coronary artery bypass graft)  Echo stress test, exercise      3  Paroxysmal atrial fibrillation (HCC)        4  Benign essential hypertension  VAS renal artery complete      5  Dyslipidemia  POCT ECG    Lipid Panel With Direct LDL    Comprehensive metabolic panel    Lipid Panel with Direct LDL reflex    Comprehensive metabolic panel      6  History of left-sided carotid endarterectomy        7  Stenosis of left carotid artery        8  ABDULAZIZ (obstructive sleep apnea)  Home Study          Discussion/Summary:  Mr Roni Piña is a pleasant 68-year-old gentleman who presents to the office today for routine follow-up  In the recent past he has had some atypical chest pain which is not related to exertion  Nonetheless I have asked that he undergo a stress echocardiogram for further evaluation  His blood pressure remains not well controlled  I have asked that he increase the losartan to 100/25 mg daily  His blood pressure will be reassessed during his stress test and he will continue to monitor it at home  A low-salt diet was reinforced  He states he previously was diagnosed with obstructive sleep apnea but does not wear CPAP  I have asked that he undergo repeat sleep study to look for this as a contributor to his hypertension  He will also undergo a renal artery duplex  His most recent lipids done during his hospital stay were suboptimal   His regimen was intensified  I have asked that he have his lipids reassessed      His carotid disease is under the care of our vascular surgery team   He remains on dual anti-platelet therapy in the setting of a stroke and carotid disease  He apparently had a one time episode of atrial fibrillation during an MI  He has not been maintained on systemic anticoagulation  There has been no documented recurrence  I will see him in the office in a six months for re-evaluation  Interval History:  Mr Bridget Anderson is a pleasant 71-year-old gentleman who presents to the office today for follow-up  I had met him when he was hospitalized with acute infarcts in his left frontal and left occipital lobes  He was noted to have severe stenosis of his left internal carotid artery which was felt to be the cause of his stroke  He underwent a left carotid endarterectomy  Post hospital he was seen by our advanced practitioner  At that time his blood pressure was well controlled in the office although he had intermittent blood pressure elevations at home for which he was taking p r n  nitroglycerin  He was advised against this  He was asked to monitor his blood pressure at home and report elevated readings to the office  He called the office reporting elevated readings  He was advised to double his lisinopril/HCTZ dose which he did for a short period time  Subsequently he returned to his original dose  His lipids during his hospital stay were suboptimal   His regimen was intensified with atorvastatin 80 mg daily and Zetia 10 mg daily  He was asked to have his lipids reassessed after that office visit with our advanced practitioner which he did not  In October he reports for two weeks that he could not get out of bed and he was having intermittent fevers and chills  Since that time he has noted some chest pain described as pressure which is " there more often than not"  It waxes and wanes  It is not related to or brought on by exertion  It is not exacerbated by exertion  It is not worse with deep breathing  It is not pleuritic  He denies any shortness of breath with exertion    He denies any signs or symptoms of congestive heart failure including lower extremity edema, paroxysmal nocturnal dyspnea, orthopnea, acute weight gain or increasing abdominal girth  He denies palpitations  He denies symptoms of claudication  Medical Problems     Problem List     S/P CABG (coronary artery bypass graft)    Uncomplicated asthma    Mixed hyperlipidemia (Chronic)    Coronary artery disease involving native coronary artery of native heart without angina pectoris (Chronic)    Essential hypertension (Chronic)    Chest pain    History of stroke    Carotid artery stenosis    Thyroid nodule    Atrial fibrillation (HCC)    Elevated serum creatinine    Hypokalemia    Leukocytosis    Multiple thyroid nodules    Prediabetes        Past Medical History:   Diagnosis Date   • Asthma    • Atrial fibrillation (HCC)    • Coronary artery disease    • HTN (hypertension)    • Hyperlipidemia    • Myocardial infarction ([facility] ) 02/2019   • Stroke ([facility] )    • TIA (transient ischemic attack)      Social History     Socioeconomic History   • Marital status: /Civil Union     Spouse name: Not on file   • Number of children: Not on file   • Years of education: Not on file   • Highest education level: Not on file   Occupational History   • Not on file   Tobacco Use   • Smoking status: Never   • Smokeless tobacco: Never   Vaping Use   • Vaping Use: Never used   Substance and Sexual Activity   • Alcohol use: Never   • Drug use: Never   • Sexual activity: Not Currently   Other Topics Concern   • Not on file   Social History Narrative   • Not on file     Social Determinants of Health     Financial Resource Strain: Not on file   Food Insecurity: No Food Insecurity   • Worried About Running Out of Food in the Last Year: Never true   • Ran Out of Food in the Last Year: Never true   Transportation Needs: No Transportation Needs   • Lack of Transportation (Medical): No   • Lack of Transportation (Non-Medical):  No   Physical Activity: Not on file Stress: Not on file   Social Connections: Not on file   Intimate Partner Violence: Not on file   Housing Stability: Low Risk    • Unable to Pay for Housing in the Last Year: No   • Number of Places Lived in the Last Year: 1   • Unstable Housing in the Last Year: No      Family History   Problem Relation Age of Onset   • Heart disease Mother    • Kidney disease Mother    • Heart disease Father      Past Surgical History:   Procedure Laterality Date   • APPENDECTOMY     • CARDIAC CATHETERIZATION  04/17/2020    RCA: 80% distal lesion  LCX/OM2 stent placement  LAD with 70-80% lesion at D1 bifurcation  • CARDIAC CATHETERIZATION  2019    OM2 and proximal LCX stenting     • CATARACT EXTRACTION Right    • CORONARY ARTERY BYPASS GRAFT     • CORONARY STENT PLACEMENT  2019    OM2 and proximal LCX stenting   • AL THROMBOENDARTECTMY NECK,NECK INCIS Left 6/18/2022    Procedure: ENDARTERECTOMY ARTERY CAROTID WITH PATCH ANGIOPLASTY;  Surgeon: Little Cloud DO;  Location: [facility];  Service: Vascular       Current Outpatient Medications:   •  albuterol (PROVENTIL HFA,VENTOLIN HFA) 90 mcg/act inhaler, Inhale 2 puffs every 6 (six) hours as needed for wheezing, Disp: 1 Inhaler, Rfl: 3  •  aspirin (ECOTRIN LOW STRENGTH) 81 mg EC tablet, Take 1 tablet (81 mg total) by mouth daily, Disp: 90 tablet, Rfl: 3  •  atorvastatin (LIPITOR) 80 mg tablet, Take 1 tablet (80 mg total) by mouth every evening, Disp: 90 tablet, Rfl: 3  •  carvedilol (COREG) 12 5 mg tablet, Take 1 tablet (12 5 mg total) by mouth 2 (two) times a day with meals, Disp: 180 tablet, Rfl: 3  •  ezetimibe (ZETIA) 10 mg tablet, Take 1 tablet (10 mg total) by mouth daily Take one tablet by mouth once daily at night , Disp: 90 tablet, Rfl: 3  •  nitroglycerin (NITROSTAT) 0 4 mg SL tablet, Place 1 tablet (0 4 mg total) under the tongue every 5 (five) minutes as needed for chest pain, Disp: 25 tablet, Rfl: 1  •  Symbicort 160-4 5 MCG/ACT inhaler, Inhale 2 puffs 2 (two) times a day Rinse mouth after use , Disp: 10 2 g, Rfl: 0  •  ticagrelor (BRILINTA) 90 MG, Take 1 tablet (90 mg total) by mouth every 12 (twelve) hours, Disp: 180 tablet, Rfl: 3  •  losartan-hydrochlorothiazide (HYZAAR) 50-12 5 mg per tablet, Take 1 tablet by mouth daily, Disp: 90 tablet, Rfl: 3  Allergies   Allergen Reactions   • Eggs Or Egg-Derived Products - Food Allergy Shortness Of Breath       Labs:     Chemistry        Component Value Date/Time    K 3 6 11/25/2022 1312     11/25/2022 1312    CO2 33 (H) 11/25/2022 1312    BUN 22 11/25/2022 1312    CREATININE 1 19 11/25/2022 1312        Component Value Date/Time    CALCIUM 8 7 11/25/2022 1312    ALKPHOS 73 06/18/2022 0459    AST 12 06/18/2022 0459    ALT 19 06/18/2022 0459            No results found for: CHOL  Lab Results   Component Value Date    HDL 47 06/16/2022    HDL 47 06/16/2022    HDL 42 06/15/2022     Lab Results   Component Value Date    LDLCALC 130 (H) 06/16/2022    LDLCALC 136 (H) 06/16/2022    LDLCALC 123 (H) 06/15/2022     Lab Results   Component Value Date    TRIG 152 (H) 06/16/2022    TRIG 142 06/16/2022    TRIG 230 (H) 06/15/2022     No results found for: CHOLHDL    Imaging: VAS carotid complete study    Result Date: 11/3/2022  Narrative:  [facility] REPORT CLINICAL: Indications:  Initial Post-op evaluation s/p revascularization  Patient is asymptomatic at this time  Operative History: 2022-06-18 Left CEA 2019 Coronary stent placement CABG Risk Factors: HTN, Hyperlipidemia and CAD  Clinical Right Pressure:  168/90 mm Hg, Left Pressure:  172/96 mm Hg  FINDINGS:  Right        Impression  PSV  EDV (cm/s)  Direction of Flow  Ratio  Dist  ICA                 60          17                      0 67  Mid  ICA                  79          20                      0 89  Prox   ICA    1 - 49%      56          16                      0 63  Dist CCA                  80          17                            Mid CCA                   89 19                      0 92  Prox CCA                  96          13                            Ext Carotid              106           9                      1 19  Prox Vert                 50          11  Antegrade                 Subclavian                79           0                             Left         Impression     PSV  EDV (cm/s)  Direction of Flow  Ratio  Dist  ICA                    67          21                      0 90  Mid  ICA                     54          16                      0 72  Prox  ICA    Widely Patent   45          12                      0 61  Dist CCA                     72           9                            Mid CCA                      75          14                      0 69  Prox CCA                    109          13                            Ext Carotid                 101          12                      1 35  Prox Vert                    67          18  Antegrade                 Subclavian                   83           0                               CONCLUSION:  Impression RIGHT: There is <50% stenosis noted in the internal carotid artery  Plaque is heterogenous and irregular  Vertebral artery flow is antegrade  There is no significant subclavian artery disease  LEFT: Widely patent internal carotid artery and endarterectomy site  Vertebral artery flow is antegrade  There is no significant subclavian artery disease  Compared to previous study on 6/16/2022, the left ICA is widely patent s/p intervention  Recommend repeat testing in 1 year as per protocol unless otherwise indicated  SIGNATURE: Electronically Signed by: Rachel Lee on 2022-11-03 01:51:03 PM      Review of Systems   Cardiovascular: Positive for chest pain  Negative for claudication, cyanosis, dyspnea on exertion, irregular heartbeat, palpitations and paroxysmal nocturnal dyspnea         Vitals:    11/30/22 1347   BP: 144/74   Pulse:    SpO2:      Vitals:    11/30/22 1318   Weight: 106 kg (234 lb 6 4 oz)     Height: 6' 4" (193 cm)   Body mass index is 28 53 kg/m²      Physical Exam:   General appearance:  Appears stated age, alert, well appearing and in no distress  HEENT:  PERRLA, EOMI, no scleral icterus, no conjunctival pallor  NECK:  Supple, No elevated JVP, no thyromegaly, no carotid bruits  HEART:  Regular rate and rhythm, normal S1/S2, no S3/S4, no murmur or rub  LUNGS:  Clear to auscultation bilaterally  ABDOMEN:  Soft, non-tender, positive bowel sounds, no rebound or guarding, no organomegaly   EXTREMITIES:  No edema  VASCULAR:  Normal pedal pulses   SKIN: No lesions or rashes on exposed skin  NEURO:  CN II-XII intact, no focal deficits

## 2022-11-30 ENCOUNTER — OFFICE VISIT (OUTPATIENT)
Dept: CARDIOLOGY CLINIC | Facility: HOSPITAL | Age: 67
End: 2022-11-30

## 2022-11-30 VITALS
HEART RATE: 57 BPM | OXYGEN SATURATION: 96 % | SYSTOLIC BLOOD PRESSURE: 144 MMHG | HEIGHT: 76 IN | DIASTOLIC BLOOD PRESSURE: 74 MMHG | BODY MASS INDEX: 28.54 KG/M2 | WEIGHT: 234.4 LBS

## 2022-11-30 DIAGNOSIS — G47.33 OSA (OBSTRUCTIVE SLEEP APNEA): ICD-10-CM

## 2022-11-30 DIAGNOSIS — I48.0 PAROXYSMAL ATRIAL FIBRILLATION (HCC): ICD-10-CM

## 2022-11-30 DIAGNOSIS — I65.22 STENOSIS OF LEFT CAROTID ARTERY: ICD-10-CM

## 2022-11-30 DIAGNOSIS — Z98.890 HISTORY OF LEFT-SIDED CAROTID ENDARTERECTOMY: ICD-10-CM

## 2022-11-30 DIAGNOSIS — I10 BENIGN ESSENTIAL HYPERTENSION: ICD-10-CM

## 2022-11-30 DIAGNOSIS — I25.10 CORONARY ARTERY DISEASE INVOLVING NATIVE CORONARY ARTERY OF NATIVE HEART WITHOUT ANGINA PECTORIS: Primary | Chronic | ICD-10-CM

## 2022-11-30 DIAGNOSIS — E78.5 DYSLIPIDEMIA: ICD-10-CM

## 2022-11-30 DIAGNOSIS — Z95.1 S/P CABG (CORONARY ARTERY BYPASS GRAFT): ICD-10-CM

## 2022-12-07 ENCOUNTER — TELEPHONE (OUTPATIENT)
Dept: SLEEP CENTER | Facility: CLINIC | Age: 67
End: 2022-12-07

## 2022-12-07 NOTE — TELEPHONE ENCOUNTER
----- Message from Bony Camarena MD sent at 12/6/2022  2:48 PM EST -----    ----- Message -----  From: Ghada Castle  Sent: 12/1/2022  10:22 AM EST  To: Sleep Medicine [place] Provider    This Home sleep study needs approval.     If approved please sign and return to clerical pool.    If denied please include reasons why.  Also provide alternative testing if warranted. Please sign and return to clerical pool.

## 2022-12-08 ENCOUNTER — TELEPHONE (OUTPATIENT)
Dept: SLEEP CENTER | Facility: CLINIC | Age: 67
End: 2022-12-08

## 2022-12-08 NOTE — TELEPHONE ENCOUNTER
----- Message from Tres Garcia MD sent at 12/7/2022  4:58 PM EST -----  Approved    ----- Message -----  From: Betsy Gurrola  Sent: 12/7/2022   2:55 PM EST  To: Tres Garcia MD    Is this sleep study approved?  ----- Message -----  From: Tres Garcia MD  Sent: 12/6/2022   2:48 PM EST  To: Betsy Gurrola      ----- Message -----  From: Betsy Gurrola  Sent: 12/1/2022  10:22 AM EST  To: Sleep Medicine [place] Provider    This Home sleep study needs approval      If approved please sign and return to clerical pool  If denied please include reasons why  Also provide alternative testing if warranted  Please sign and return to clerical pool

## 2022-12-27 DIAGNOSIS — I10 BENIGN ESSENTIAL HYPERTENSION: ICD-10-CM

## 2022-12-27 DIAGNOSIS — I48.0 PAROXYSMAL ATRIAL FIBRILLATION (HCC): Primary | ICD-10-CM

## 2022-12-27 DIAGNOSIS — I25.10 CORONARY ARTERY DISEASE INVOLVING NATIVE CORONARY ARTERY OF NATIVE HEART WITHOUT ANGINA PECTORIS: ICD-10-CM

## 2022-12-27 RX ORDER — NITROGLYCERIN 0.4 MG/1
0.4 TABLET SUBLINGUAL
Qty: 25 TABLET | Refills: 1 | Status: SHIPPED | OUTPATIENT
Start: 2022-12-27

## 2023-01-18 ENCOUNTER — HOSPITAL ENCOUNTER (OUTPATIENT)
Dept: NON INVASIVE DIAGNOSTICS | Facility: HOSPITAL | Age: 68
Discharge: HOME | End: 2023-01-18
Attending: INTERNAL MEDICINE

## 2023-01-19 ENCOUNTER — TELEPHONE (OUTPATIENT)
Dept: SLEEP CENTER | Facility: CLINIC | Age: 68
End: 2023-01-19

## 2023-01-19 NOTE — TELEPHONE ENCOUNTER
----- Message from Jerzy Haywood MD sent at 1/18/2023  1:49 PM EST -----  Approved    ----- Message -----  From: David Novoa  Sent: 1/13/2023  11:08 AM EST  To: Sleep Medicine [place] Provider    This home sleep study needs approval      If approved please sign and return to clerical pool  If denied please include reasons why  Also provide alternative testing if warranted  Please sign and return to clerical pool

## 2023-03-17 ENCOUNTER — HOSPITAL ENCOUNTER (OUTPATIENT)
Dept: NON INVASIVE DIAGNOSTICS | Facility: HOSPITAL | Age: 68
Discharge: HOME | End: 2023-03-17
Attending: INTERNAL MEDICINE

## 2023-03-17 VITALS
OXYGEN SATURATION: 97 % | DIASTOLIC BLOOD PRESSURE: 80 MMHG | WEIGHT: 234 LBS | HEIGHT: 76 IN | RESPIRATION RATE: 16 BRPM | HEART RATE: 64 BPM | SYSTOLIC BLOOD PRESSURE: 150 MMHG | BODY MASS INDEX: 28.49 KG/M2

## 2023-03-17 DIAGNOSIS — Z95.1 S/P CABG (CORONARY ARTERY BYPASS GRAFT): ICD-10-CM

## 2023-03-17 DIAGNOSIS — I25.10 CORONARY ARTERY DISEASE INVOLVING NATIVE CORONARY ARTERY OF NATIVE HEART WITHOUT ANGINA PECTORIS: Chronic | ICD-10-CM

## 2023-03-17 DIAGNOSIS — I10 BENIGN ESSENTIAL HYPERTENSION: ICD-10-CM

## 2023-03-17 LAB
MAX HR PERCENT: 87 %
MAX HR: 134 BPM
RATE PRESSURE PRODUCT: NORMAL
SL CV LV EF: 55
SL CV STRESS RECOVERY BP: NORMAL MMHG
SL CV STRESS RECOVERY HR: 77 BPM
SL CV STRESS RECOVERY O2 SAT: 98 %
SL CV STRESS STAGE REACHED: 3
STRESS ANGINA INDEX: 0
STRESS BASELINE BP: NORMAL MMHG
STRESS BASELINE HR: 64 BPM
STRESS O2 SAT REST: 97 %
STRESS PEAK HR: 130 BPM
STRESS POST ESTIMATED WORKLOAD: 7.4 METS
STRESS POST EXERCISE DUR MIN: 6 MIN
STRESS POST EXERCISE DUR SEC: 17 SEC
STRESS POST O2 SAT PEAK: 95 %
STRESS POST PEAK BP: 170 MMHG

## 2023-03-21 LAB
ARRHY DURING EX: NORMAL
CHEST PAIN STATEMENT: NORMAL
MAX DIASTOLIC BP: 90 MMHG
MAX HEART RATE: 144 BPM
MAX PREDICTED HEART RATE: 153 BPM
MAX. SYSTOLIC BP: 170 MMHG
PROTOCOL NAME: NORMAL
TARGET HR FORMULA: NORMAL
TEST INDICATION: NORMAL
TIME IN EXERCISE PHASE: NORMAL

## 2023-04-25 ENCOUNTER — TELEPHONE (OUTPATIENT)
Dept: NEUROLOGY | Facility: CLINIC | Age: 68
End: 2023-04-25

## 2023-04-25 NOTE — TELEPHONE ENCOUNTER
GLENDY to confirm your upcoming appt, 4/26/23 @ 1:15pm (1:00pm) at the [facility], to confirm/RS if you are unable to keep this appt please call [phone]

## 2023-04-28 DIAGNOSIS — I25.10 CORONARY ARTERY DISEASE INVOLVING NATIVE CORONARY ARTERY OF NATIVE HEART WITHOUT ANGINA PECTORIS: ICD-10-CM

## 2023-05-01 RX ORDER — NITROGLYCERIN 0.4 MG/1
0.4 TABLET SUBLINGUAL
Qty: 25 TABLET | Refills: 0 | Status: SHIPPED | OUTPATIENT
Start: 2023-05-01

## 2023-05-30 NOTE — PROGRESS NOTES
Cardiology Follow Up    Maritza Gabriel  1955  [id number]  [facility]  [address]  [phone]    1  Coronary artery disease involving native coronary artery of native heart without angina pectoris        2  Benign essential hypertension        3  Stenosis of left carotid artery        4  Paroxysmal atrial fibrillation (HCC)        5  S/P CABG (coronary artery bypass graft)        6  Dyslipidemia        7  Other chest pain        8  History of stroke        9  ABDULAZIZ (obstructive sleep apnea)        10   Essential hypertension  carvedilol (COREG) 12 5 mg tablet          Discussion/Summary:    CAD s/p CABG  s/p prior robotic CABG (LIMA to LAD-diag - in 4/2020) and prior PCI /SHREIF to LCX and distal RCA (2019)  At last office visit with Dr Jaymie Kramer in November 2022 he noted atypical CP and stress echo from March 2023 with no evidence of ischemia  Today he states he has no c/o chest pain with exertion  Continue aspirin/statin/beta blocker    Paroxysmal Afib  Initially documented in 2019 but no documented recurrence; he has not been on [address] in the past  He denies any palpitations  Rhythm is regular on exam    Essential HTN  His BP was elevated at last office visit and his losartan/HCTZ was increased; renal artery ultrasound with no evidence of INES  Today his /90 and 138/90 on recheck; he states his SBP at home ranges from 140's-170's but is rarely less than 140  He notes he has only been taking his carvedilol once daily  He will start taking it twice a day and call us next week with BP readings  He is maintained on losartan/HCTZ 50-12 5 mg QD and Carvedilol 12 5 mg BID  He follows a low sodium diet    Hyperlipidemia  Last lipid panel is from June 2022 at time of CVA/left carotid endarterectomy and statin was intensified at that time:  Triglycerides 152  HDL 47    He has been asked "several times to have updated lipid panel drawn but has yet to do so  He is maintained on Lipitor 80 mg QD and Zetia 10 mg QD; his LDL is above goal of <70  I have again reinforced the need for updated labs    Hx of left ICA stenosis/CVA  Hx of left frontal/occipital CVA in June 2022 in setting of left ICA stenosis  He underwent left ICA endarterectomy  Last carotid duplex from 11/2022: <50% right ICA stenosis and widely patent left ICA and endarterectomy site  He is due for updated testing that is scheduled for last June 2023  He follows with Vascular team and is due for follow up; he was provided with their phone number to make an appt    ABDULAZIZ  Known hx of ABDULAZIZ but had been non-compliant with CPAP  Repeat sleep study ordered and is scheduled for last August 2023    He will return to the office in 6 months to follow up with Dr Dimas Mc    Interval History:   Olesya Ritchie is a 76 y o  male with PMH of coronary artery disease s/p prior stent placement to OM2 and circumflex in 2019, s/p RCA and OM1 stenting in 2020 with subsequent robotic CABG in April 2020 - LIMA-LAD, hypertension, dyslipidemia, ABDULAZIZ, remote hx of PAF, recent CVA involving the left frontal and left occipital lobes secondary to left carotid artery stenosis s/p left CEA who presents to the office today for routine follow up visit  Today, he overall feels well from a cardiac standpoint  I did ask if he is having any kind of chest comfort with exertion  He states that he is not but occasionally he will notice a \"presence\" in the chest   He states that this is similar to what he had when he saw Dr Dimas Mc last but the frequency is lessened  He is unable to elaborate any further on the sensation  However, when asked again he states that he is rather active and no activities bring on chest discomfort, shortness of breath or palpitations  He also denies any lower extremity edema, orthopnea, dizziness/lightheadedness or near syncope/syncope    We did review " the patient's blood pressure today I noted that it is 140/90 but was 138/90 on recheck  Patient states he does check his blood pressure at home frequently and his systolic blood pressure is typically ranging anywhere from the 140s to 170s but is often times higher than not  He does state he follows a low-sodium diet  Upon reviewing his medications the patient is currently taking losartan 50 mg/hydrochlorothiazide 25 mg  He is scheduled to take carvedilol 12 5 mg twice daily but he is only taking it once daily  I have asked him to take this twice a day and monitor his blood pressures twice daily for the next 7 to 10 days and contact our office with results  We also reviewed that his cholesterol levels that were drawn in June 2022 show his LDL is above goal at 130  His goal should be less than 70 given his history of CAD  He has been asked a few times to have his blood work redrawn and I have again encouraged him today to have his lipid panel redrawn  The patient does have complaints of daytime fatigue and sleepiness and I do think he likely has untreated obstructive sleep apnea  He has been asked to perform an updated sleep study in the past which had not yet been done however he is now scheduled for this in August 2023  I did inform him that untreated sleep apnea may be possible contributing factor to his elevated blood pressure  Overall, the patient is stable from a cardiac standpoint with no cardiac complaints  He has lost weight over the past few years and I encouraged him to continue a cardiovascular exercise routine which he currently does not have  I suggested walking at least 30 minutes a day for 5 days a week  He is also due for a follow-up with vascular surgery and he is provided with the phone number today to call to make an appointment        Medical Problems     Problem List     S/P CABG (coronary artery bypass graft)    Uncomplicated asthma    Dyslipidemia    Coronary artery disease involving native coronary artery of native heart without angina pectoris (Chronic)    Benign essential hypertension    Chest pain    History of stroke    Carotid artery stenosis    Thyroid nodule    Paroxysmal atrial fibrillation (HCC)    Elevated serum creatinine    Hypokalemia    Leukocytosis    Multiple thyroid nodules    Prediabetes    History of left-sided carotid endarterectomy        Past Medical History:   Diagnosis Date   • Asthma    • Atrial fibrillation (HCC)    • Coronary artery disease    • Fatigue    • HTN (hypertension)    • Hyperlipidemia    • Myocardial infarction ([facility] ) 02/2019   • Stroke ([facility] )    • TIA (transient ischemic attack)      Social History     Socioeconomic History   • Marital status: /Civil Union     Spouse name: Not on file   • Number of children: Not on file   • Years of education: Not on file   • Highest education level: Not on file   Occupational History   • Not on file   Tobacco Use   • Smoking status: Never   • Smokeless tobacco: Never   Vaping Use   • Vaping Use: Never used   Substance and Sexual Activity   • Alcohol use: Never   • Drug use: Never   • Sexual activity: Not Currently   Other Topics Concern   • Not on file   Social History Narrative   • Not on file     Social Determinants of Health     Financial Resource Strain: Not on file   Food Insecurity: No Food Insecurity (6/16/2022)    Hunger Vital Sign    • Worried About Running Out of Food in the Last Year: Never true    • Ran Out of Food in the Last Year: Never true   Transportation Needs: No Transportation Needs (6/16/2022)    PRAPARE - Transportation    • Lack of Transportation (Medical): No    • Lack of Transportation (Non-Medical):  No   Physical Activity: Not on file   Stress: Not on file   Social Connections: Not on file   Intimate Partner Violence: Not on file   Housing Stability: Low Risk  (6/16/2022)    Housing Stability Vital Sign    • Unable to Pay for Housing in the Last Year: No    • Number of Places Lived in the Last Year: 1    • Unstable Housing in the Last Year: No      Family History   Problem Relation Age of Onset   • Heart disease Mother    • Kidney disease Mother    • Heart disease Father      Past Surgical History:   Procedure Laterality Date   • APPENDECTOMY     • CARDIAC CATHETERIZATION  04/17/2020    RCA: 80% distal lesion  LCX/OM2 stent placement  LAD with 70-80% lesion at D1 bifurcation  • CARDIAC CATHETERIZATION  2019    OM2 and proximal LCX stenting     • CATARACT EXTRACTION Right    • CORONARY ARTERY BYPASS GRAFT     • CORONARY STENT PLACEMENT  2019    OM2 and proximal LCX stenting   • ND TEAEC W/PATCH GRF CAROTID VERTB SUBCLAV NECK INC Left 6/18/2022    Procedure: ENDARTERECTOMY ARTERY CAROTID WITH PATCH ANGIOPLASTY;  Surgeon: Homero Delgado DO;  Location: [facility];  Service: Vascular       Current Outpatient Medications:   •  albuterol (PROVENTIL HFA,VENTOLIN HFA) 90 mcg/act inhaler, Inhale 2 puffs every 6 (six) hours as needed for wheezing, Disp: 1 Inhaler, Rfl: 3  •  aspirin (ECOTRIN LOW STRENGTH) 81 mg EC tablet, Take 1 tablet (81 mg total) by mouth daily, Disp: 90 tablet, Rfl: 3  •  atorvastatin (LIPITOR) 80 mg tablet, Take 1 tablet (80 mg total) by mouth every evening, Disp: 90 tablet, Rfl: 3  •  carvedilol (COREG) 12 5 mg tablet, Take 1 tablet (12 5 mg total) by mouth 2 (two) times a day with meals, Disp: 180 tablet, Rfl: 3  •  ezetimibe (ZETIA) 10 mg tablet, Take 1 tablet (10 mg total) by mouth daily Take one tablet by mouth once daily at night , Disp: 90 tablet, Rfl: 3  •  losartan-hydrochlorothiazide (HYZAAR) 50-12 5 mg per tablet, Take 1 tablet by mouth daily, Disp: 90 tablet, Rfl: 3  •  nitroglycerin (NITROSTAT) 0 4 mg SL tablet, Place 1 tablet (0 4 mg total) under the tongue every 5 (five) minutes as needed for chest pain, Disp: 25 tablet, Rfl: 0  •  Symbicort 160-4 5 MCG/ACT inhaler, Inhale 2 puffs 2 (two) times a day Rinse mouth after use , Disp: 10 2 g, Rfl: 0  • "ticagrelor (BRILINTA) 90 MG, Take 1 tablet (90 mg total) by mouth every 12 (twelve) hours, Disp: 180 tablet, Rfl: 3  Allergies   Allergen Reactions   • Eggs Or Egg-Derived Products - Food Allergy Shortness Of Breath       Labs:     Chemistry        Component Value Date/Time    BUN 22 11/25/2022 1312     11/25/2022 1312    CO2 33 (H) 11/25/2022 1312    CREATININE 1 19 11/25/2022 1312    K 3 6 11/25/2022 1312        Component Value Date/Time    ALKPHOS 73 06/18/2022 0459    ALT 19 06/18/2022 0459    AST 12 06/18/2022 0459    CALCIUM 8 7 11/25/2022 1312            No results found for: \"CHOL\"  Lab Results   Component Value Date    HDL 47 06/16/2022    HDL 47 06/16/2022    HDL 42 06/15/2022     Lab Results   Component Value Date    LDLCALC 130 (H) 06/16/2022    LDLCALC 136 (H) 06/16/2022    LDLCALC 123 (H) 06/15/2022     Lab Results   Component Value Date    TRIG 152 (H) 06/16/2022    TRIG 142 06/16/2022    TRIG 230 (H) 06/15/2022     No results found for: \"CHOLHDL\"    Imaging: No results found  Review of Systems   Constitutional: Negative for chills, fever and malaise/fatigue  HENT: Negative for congestion  Cardiovascular: Negative for chest pain, dyspnea on exertion, leg swelling, orthopnea and palpitations  Respiratory: Negative for cough, shortness of breath (no SOB at rest) and wheezing  Endocrine: Negative  Hematologic/Lymphatic: Negative  Skin: Negative  Musculoskeletal: Negative  Gastrointestinal: Negative for bloating, abdominal pain, nausea and vomiting  Genitourinary: Negative  Neurological: Positive for excessive daytime sleepiness  Negative for dizziness and light-headedness  Psychiatric/Behavioral: Negative  All other systems reviewed and are negative  Vitals:    06/02/23 0854   BP: 140/90   Pulse: 64   SpO2: 97%     Vitals:    06/02/23 0854   Weight: 97 8 kg (215 lb 9 6 oz)     Height: 6' 4\" (193 cm)   Body mass index is 26 24 kg/m²      Physical " Exam:  Physical Exam  Vitals reviewed  Constitutional:       General: He is not in acute distress  HENT:      Head: Normocephalic and atraumatic  Mouth/Throat:      Mouth: Mucous membranes are moist    Cardiovascular:      Rate and Rhythm: Normal rate and regular rhythm  Heart sounds: Normal heart sounds, S1 normal and S2 normal  No murmur heard  Pulmonary:      Effort: Pulmonary effort is normal  No respiratory distress  Breath sounds: Normal breath sounds  Abdominal:      General: Bowel sounds are normal  There is no distension  Palpations: Abdomen is soft  Musculoskeletal:         General: Normal range of motion  Cervical back: Normal range of motion and neck supple  Right lower leg: No edema  Left lower leg: No edema  Skin:     General: Skin is warm and dry  Neurological:      Mental Status: He is alert and oriented to person, place, and time     Psychiatric:         Mood and Affect: Mood normal

## 2023-06-02 ENCOUNTER — APPOINTMENT (OUTPATIENT)
Dept: LAB | Facility: HOSPITAL | Age: 68
End: 2023-06-02
Payer: COMMERCIAL

## 2023-06-02 ENCOUNTER — OFFICE VISIT (OUTPATIENT)
Dept: CARDIOLOGY CLINIC | Facility: HOSPITAL | Age: 68
End: 2023-06-02

## 2023-06-02 VITALS
BODY MASS INDEX: 26.25 KG/M2 | SYSTOLIC BLOOD PRESSURE: 140 MMHG | HEIGHT: 76 IN | DIASTOLIC BLOOD PRESSURE: 90 MMHG | HEART RATE: 64 BPM | OXYGEN SATURATION: 97 % | WEIGHT: 215.6 LBS

## 2023-06-02 DIAGNOSIS — Z95.1 S/P CABG (CORONARY ARTERY BYPASS GRAFT): ICD-10-CM

## 2023-06-02 DIAGNOSIS — Z86.73 HISTORY OF STROKE: ICD-10-CM

## 2023-06-02 DIAGNOSIS — I48.0 PAROXYSMAL ATRIAL FIBRILLATION (HCC): ICD-10-CM

## 2023-06-02 DIAGNOSIS — I10 ESSENTIAL HYPERTENSION: ICD-10-CM

## 2023-06-02 DIAGNOSIS — E78.5 DYSLIPIDEMIA: ICD-10-CM

## 2023-06-02 DIAGNOSIS — I10 BENIGN ESSENTIAL HYPERTENSION: ICD-10-CM

## 2023-06-02 DIAGNOSIS — R07.89 OTHER CHEST PAIN: ICD-10-CM

## 2023-06-02 DIAGNOSIS — R73.03 PREDIABETES: ICD-10-CM

## 2023-06-02 DIAGNOSIS — I65.22 STENOSIS OF LEFT CAROTID ARTERY: ICD-10-CM

## 2023-06-02 DIAGNOSIS — I25.10 CORONARY ARTERY DISEASE INVOLVING NATIVE CORONARY ARTERY OF NATIVE HEART WITHOUT ANGINA PECTORIS: Primary | Chronic | ICD-10-CM

## 2023-06-02 DIAGNOSIS — G47.33 OSA (OBSTRUCTIVE SLEEP APNEA): ICD-10-CM

## 2023-06-02 LAB
ALBUMIN SERPL BCP-MCNC: 4.3 G/DL (ref 3.5–5)
ALP SERPL-CCNC: 64 U/L (ref 34–104)
ALT SERPL W P-5'-P-CCNC: 11 U/L (ref 7–52)
ANION GAP SERPL CALCULATED.3IONS-SCNC: 7 MMOL/L (ref 4–13)
AST SERPL W P-5'-P-CCNC: 14 U/L (ref 13–39)
BILIRUB SERPL-MCNC: 1.88 MG/DL (ref 0.2–1)
BUN SERPL-MCNC: 16 MG/DL (ref 5–25)
CALCIUM SERPL-MCNC: 9.2 MG/DL (ref 8.4–10.2)
CHLORIDE SERPL-SCNC: 100 MMOL/L (ref 96–108)
CHOLEST SERPL-MCNC: 224 MG/DL
CO2 SERPL-SCNC: 33 MMOL/L (ref 21–32)
CREAT SERPL-MCNC: 1.13 MG/DL (ref 0.6–1.3)
EST. AVERAGE GLUCOSE BLD GHB EST-MCNC: 114 MG/DL
GFR SERPL CREATININE-BSD FRML MDRD: 66 ML/MIN/1.73SQ M
GLUCOSE P FAST SERPL-MCNC: 99 MG/DL (ref 65–99)
HBA1C MFR BLD: 5.6 %
HDLC SERPL-MCNC: 50 MG/DL
LDLC SERPL CALC-MCNC: 137 MG/DL (ref 0–100)
LDLC SERPL DIRECT ASSAY-MCNC: 149 MG/DL (ref 0–100)
NONHDLC SERPL-MCNC: 174 MG/DL
POTASSIUM SERPL-SCNC: 3.7 MMOL/L (ref 3.5–5.3)
PROT SERPL-MCNC: 6.8 G/DL (ref 6.4–8.4)
SODIUM SERPL-SCNC: 140 MMOL/L (ref 135–147)
TRIGL SERPL-MCNC: 184 MG/DL

## 2023-06-02 PROCEDURE — 83721 ASSAY OF BLOOD LIPOPROTEIN: CPT

## 2023-06-02 PROCEDURE — 80061 LIPID PANEL: CPT

## 2023-06-02 PROCEDURE — 83036 HEMOGLOBIN GLYCOSYLATED A1C: CPT

## 2023-06-02 RX ORDER — CARVEDILOL 12.5 MG/1
12.5 TABLET ORAL 2 TIMES DAILY WITH MEALS
Qty: 180 TABLET | Refills: 3
Start: 2023-06-02

## 2023-06-06 ENCOUNTER — TELEPHONE (OUTPATIENT)
Dept: CARDIOLOGY CLINIC | Facility: CLINIC | Age: 68
End: 2023-06-06

## 2023-06-06 DIAGNOSIS — E78.5 DYSLIPIDEMIA: Primary | ICD-10-CM

## 2023-06-06 NOTE — TELEPHONE ENCOUNTER
P/C left on line looking for resutls,    Called pt back he advised has already spoken to someone about the results      Pt verbally understood

## 2023-06-21 ENCOUNTER — TELEPHONE (OUTPATIENT)
Dept: CARDIOLOGY CLINIC | Facility: CLINIC | Age: 68
End: 2023-06-21

## 2023-06-21 ENCOUNTER — TELEPHONE (OUTPATIENT)
Dept: VASCULAR SURGERY | Facility: CLINIC | Age: 68
End: 2023-06-21

## 2023-06-21 NOTE — TELEPHONE ENCOUNTER
If the patient is having new symptoms concerning for a stroke he should be evaluated in the ED  Last duplex carotid L ICA was widely patient and right ICA <50% stenosis   He should be evaluated
Patient called concerned with a new onset of stuttering and inability to pronounce words,  I explained expressive aphasia to him and asked if that is what he is experiencing and he was not totally sure , it might be mildly that and also difficulty with getting words out and stuttering  He was concerend about a new onset of stroke Patient actually had a  Carotid doppler scheduled for 6/26 at [place] which I asked call center to move up   Meanwhile, I will send to triage to advise 
Returned patients call and told  Him to go to ED for evaulation 
Initial (On Arrival)

## 2023-06-21 NOTE — TELEPHONE ENCOUNTER
Patient called requesting name & phone number of the vascular surgeon who did his surgery  Shared Dr Demi Briceño office James Munguia- phone number [phone]  Pt  verbalized understanding

## 2023-06-22 ENCOUNTER — HOSPITAL ENCOUNTER (EMERGENCY)
Facility: HOSPITAL | Age: 68
Discharge: HOME | End: 2023-06-22
Attending: EMERGENCY MEDICINE
Payer: COMMERCIAL

## 2023-06-22 ENCOUNTER — HOSPITAL ENCOUNTER (OUTPATIENT)
Dept: NON INVASIVE DIAGNOSTICS | Facility: HOSPITAL | Age: 68
Discharge: HOME | End: 2023-06-22
Attending: SURGERY
Payer: COMMERCIAL

## 2023-06-22 ENCOUNTER — APPOINTMENT (EMERGENCY)
Dept: CT IMAGING | Facility: HOSPITAL | Age: 68
End: 2023-06-22
Payer: COMMERCIAL

## 2023-06-22 VITALS
SYSTOLIC BLOOD PRESSURE: 147 MMHG | RESPIRATION RATE: 13 BRPM | HEART RATE: 53 BPM | TEMPERATURE: 97.5 F | OXYGEN SATURATION: 95 % | DIASTOLIC BLOOD PRESSURE: 70 MMHG

## 2023-06-22 DIAGNOSIS — Z98.890 S/P CAROTID ENDARTERECTOMY: ICD-10-CM

## 2023-06-22 DIAGNOSIS — R53.1 WEAKNESS: Primary | ICD-10-CM

## 2023-06-22 LAB
ANION GAP SERPL CALCULATED.3IONS-SCNC: 6 MMOL/L
BASOPHILS # BLD AUTO: 0.05 THOUSANDS/ÂΜL (ref 0–0.1)
BASOPHILS NFR BLD AUTO: 1 % (ref 0–1)
BUN SERPL-MCNC: 16 MG/DL (ref 5–25)
CALCIUM SERPL-MCNC: 9.3 MG/DL (ref 8.4–10.2)
CARDIAC TROPONIN I PNL SERPL HS: 5 NG/L
CHLORIDE SERPL-SCNC: 98 MMOL/L (ref 96–108)
CO2 SERPL-SCNC: 35 MMOL/L (ref 21–32)
CREAT SERPL-MCNC: 1.08 MG/DL (ref 0.6–1.3)
EOSINOPHIL # BLD AUTO: 0.21 THOUSAND/ÂΜL (ref 0–0.61)
EOSINOPHIL NFR BLD AUTO: 3 % (ref 0–6)
ERYTHROCYTE [DISTWIDTH] IN BLOOD BY AUTOMATED COUNT: 13.1 % (ref 11.6–15.1)
GFR SERPL CREATININE-BSD FRML MDRD: 70 ML/MIN/1.73SQ M
GLUCOSE SERPL-MCNC: 122 MG/DL (ref 65–140)
HCT VFR BLD AUTO: 50.2 % (ref 36.5–49.3)
HGB BLD-MCNC: 16.3 G/DL (ref 12–17)
IMM GRANULOCYTES # BLD AUTO: 0.01 THOUSAND/UL (ref 0–0.2)
IMM GRANULOCYTES NFR BLD AUTO: 0 % (ref 0–2)
LYMPHOCYTES # BLD AUTO: 1.35 THOUSANDS/ÂΜL (ref 0.6–4.47)
LYMPHOCYTES NFR BLD AUTO: 22 % (ref 14–44)
MCH RBC QN AUTO: 29.8 PG (ref 26.8–34.3)
MCHC RBC AUTO-ENTMCNC: 32.5 G/DL (ref 31.4–37.4)
MCV RBC AUTO: 92 FL (ref 82–98)
MONOCYTES # BLD AUTO: 0.6 THOUSAND/ÂΜL (ref 0.17–1.22)
MONOCYTES NFR BLD AUTO: 10 % (ref 4–12)
NEUTROPHILS # BLD AUTO: 3.88 THOUSANDS/ÂΜL (ref 1.85–7.62)
NEUTS SEG NFR BLD AUTO: 64 % (ref 43–75)
NRBC BLD AUTO-RTO: 0 /100 WBCS
PLATELET # BLD AUTO: 175 THOUSANDS/UL (ref 149–390)
PMV BLD AUTO: 9.2 FL (ref 8.9–12.7)
POTASSIUM SERPL-SCNC: 3.6 MMOL/L (ref 3.5–5.3)
RBC # BLD AUTO: 5.47 MILLION/UL (ref 3.88–5.62)
SODIUM SERPL-SCNC: 139 MMOL/L (ref 135–147)
WBC # BLD AUTO: 6.1 THOUSAND/UL (ref 4.31–10.16)

## 2023-06-22 PROCEDURE — 99285 EMERGENCY DEPT VISIT HI MDM: CPT

## 2023-06-22 PROCEDURE — 80048 BASIC METABOLIC PNL TOTAL CA: CPT | Performed by: EMERGENCY MEDICINE

## 2023-06-22 PROCEDURE — 93880 EXTRACRANIAL BILAT STUDY: CPT

## 2023-06-22 PROCEDURE — 84484 ASSAY OF TROPONIN QUANT: CPT | Performed by: EMERGENCY MEDICINE

## 2023-06-22 PROCEDURE — 85025 COMPLETE CBC W/AUTO DIFF WBC: CPT | Performed by: EMERGENCY MEDICINE

## 2023-06-22 PROCEDURE — 36415 COLL VENOUS BLD VENIPUNCTURE: CPT | Performed by: EMERGENCY MEDICINE

## 2023-06-22 PROCEDURE — 93880 EXTRACRANIAL BILAT STUDY: CPT | Performed by: SURGERY

## 2023-06-22 PROCEDURE — G1004 CDSM NDSC: HCPCS

## 2023-06-22 PROCEDURE — 70450 CT HEAD/BRAIN W/O DYE: CPT

## 2023-06-22 PROCEDURE — 93005 ELECTROCARDIOGRAM TRACING: CPT

## 2023-06-22 NOTE — ED PROVIDER NOTES
History  Chief Complaint   Patient presents with   • Medical Problem     States for the past week he has felt and increase in lethargy  And has been having some slurred speech over the past week  States he just feels off  Has had dental implants placed about 2 weeks ago and first noticed symptoms around the same time implants were placed      69-year-old male, presenting with 2 weeks of generalized weakness  Patient states over the past 2 weeks he has felt increased fatigue  Is also noticed at times that he has difficulty speaking intermittently  Denies any fevers or recent illness, no chest pain, palpitations, or shortness of breath  Wife reports that she has noticed patient has been more fatigued and does have some intermittent episodes of word finding difficulty over the past 2 weeks  Patient has been under increased stress over the past 2 weeks  Reports history of stroke causing aphasia  History provided by:  Patient and spouse   used: No    Medical Problem  Associated symptoms: fatigue    Associated symptoms: no fever        Prior to Admission Medications   Prescriptions Last Dose Informant Patient Reported? Taking? Symbicort 160-4 5 MCG/ACT inhaler  Self No No   Sig: Inhale 2 puffs 2 (two) times a day Rinse mouth after use     albuterol (PROVENTIL HFA,VENTOLIN HFA) 90 mcg/act inhaler  Self No No   Sig: Inhale 2 puffs every 6 (six) hours as needed for wheezing   aspirin (ECOTRIN LOW STRENGTH) 81 mg EC tablet  Self No No   Sig: Take 1 tablet (81 mg total) by mouth daily   atorvastatin (LIPITOR) 80 mg tablet   No No   Sig: Take 1 tablet (80 mg total) by mouth every evening   carvedilol (COREG) 12 5 mg tablet   No No   Sig: Take 1 tablet (12 5 mg total) by mouth 2 (two) times a day with meals   ezetimibe (ZETIA) 10 mg tablet  Self No No   Sig: Take 1 tablet (10 mg total) by mouth daily Take one tablet by mouth once daily at night    losartan-hydrochlorothiazide (HYZAAR) 50-12 5 mg per tablet   No No   Sig: Take 1 tablet by mouth daily   nitroglycerin (NITROSTAT) 0 4 mg SL tablet   No No   Sig: Place 1 tablet (0 4 mg total) under the tongue every 5 (five) minutes as needed for chest pain   ticagrelor (BRILINTA) 90 MG  Self No No   Sig: Take 1 tablet (90 mg total) by mouth every 12 (twelve) hours      Facility-Administered Medications: None       Past Medical History:   Diagnosis Date   • Asthma    • Atrial fibrillation (HCC)    • Coronary artery disease    • Fatigue    • HTN (hypertension)    • Hyperlipidemia    • Myocardial infarction ([facility] [address] ) 02/2019   • Stroke [facility])    • TIA (transient ischemic attack)        Past Surgical History:   Procedure Laterality Date   • APPENDECTOMY     • CARDIAC CATHETERIZATION  04/17/2020    RCA: 80% distal lesion  LCX/OM2 stent placement  LAD with 70-80% lesion at D1 bifurcation  • CARDIAC CATHETERIZATION  2019    OM2 and proximal LCX stenting  • CATARACT EXTRACTION Right    • CORONARY ARTERY BYPASS GRAFT     • CORONARY STENT PLACEMENT  2019    OM2 and proximal LCX stenting   • AZ TEAEC W/PATCH GRF CAROTID VERTB SUBCLAV NECK INC Left 6/18/2022    Procedure: ENDARTERECTOMY ARTERY CAROTID WITH PATCH ANGIOPLASTY;  Surgeon: Dasia Godoy DO;  Location: [facility];  Service: Vascular       Family History   Problem Relation Age of Onset   • Heart disease Mother    • Kidney disease Mother    • Heart disease Father      I have reviewed and agree with the history as documented  E-Cigarette/Vaping   • E-Cigarette Use Never User      E-Cigarette/Vaping Substances   • Nicotine No    • THC No    • CBD No    • Flavoring No    • Other No    • Unknown No      Social History     Tobacco Use   • Smoking status: Never   • Smokeless tobacco: Never   Vaping Use   • Vaping Use: Never used   Substance Use Topics   • Alcohol use: Never   • Drug use: Never       Review of Systems   Constitutional: Positive for fatigue  Negative for fever  Respiratory: Negative  Cardiovascular: Negative  Gastrointestinal: Negative  Physical Exam  Physical Exam  Vitals and nursing note reviewed  Constitutional:       General: He is not in acute distress  HENT:      Head: Normocephalic and atraumatic  Mouth/Throat:      Mouth: Mucous membranes are moist       Pharynx: Oropharynx is clear  Eyes:      Extraocular Movements: Extraocular movements intact  Pupils: Pupils are equal, round, and reactive to light  Cardiovascular:      Rate and Rhythm: Regular rhythm  Bradycardia present  Pulmonary:      Effort: Pulmonary effort is normal       Breath sounds: Normal breath sounds  Abdominal:      Palpations: Abdomen is soft  Tenderness: There is no abdominal tenderness  Musculoskeletal:         General: Normal range of motion  Skin:     General: Skin is warm and dry  Neurological:      General: No focal deficit present  Mental Status: He is alert and oriented to person, place, and time        Comments: Normal speech, no facial weakness  5 out of 5 strength all 4 extremities, no drift  Normal gait, negative Romberg sign         Vital Signs  ED Triage Vitals [06/22/23 1348]   Temperature Pulse Respirations Blood Pressure SpO2   97 5 °F (36 4 °C) 55 18 169/76 98 %      Temp Source Heart Rate Source Patient Position - Orthostatic VS BP Location FiO2 (%)   Temporal Monitor Lying Right arm --      Pain Score       --           Vitals:    06/22/23 1348 06/22/23 1430 06/22/23 1515   BP: 169/76 (!) 188/78 (!) 185/84   Pulse: 55 (!) 54 (!) 54   Patient Position - Orthostatic VS: Lying           Visual Acuity      ED Medications  Medications - No data to display    Diagnostic Studies  Results Reviewed     Procedure Component Value Units Date/Time    HS Troponin 0hr (reflex protocol) [[id number]]  (Normal) Collected: 06/22/23 1417    Lab Status: Final result Specimen: Blood from Arm, Right Updated: 06/22/23 1446     hs TnI 0hr 5 ng/L     Basic metabolic panel [[id number]]  (Abnormal) Collected: 06/22/23 1417    Lab Status: Final result Specimen: Blood from Arm, Right Updated: 06/22/23 1439     Sodium 139 mmol/L      Potassium 3 6 mmol/L      Chloride 98 mmol/L      CO2 35 mmol/L      ANION GAP 6 mmol/L      BUN 16 mg/dL      Creatinine 1 08 mg/dL      Glucose 122 mg/dL      Calcium 9 3 mg/dL      eGFR 70 ml/min/1 73sq m     Narrative:      [place] guidelines for Chronic Kidney Disease (CKD):   •  Stage 1 with normal or high GFR (GFR > 90 mL/min/1 73 square meters)  •  Stage 2 Mild CKD (GFR = 60-89 mL/min/1 73 square meters)  •  Stage 3A Moderate CKD (GFR = 45-59 mL/min/1 73 square meters)  •  Stage 3B Moderate CKD (GFR = 30-44 mL/min/1 73 square meters)  •  Stage 4 Severe CKD (GFR = 15-29 mL/min/1 73 square meters)  •  Stage 5 End Stage CKD (GFR <15 mL/min/1 73 square meters)  Note: GFR calculation is accurate only with a steady state creatinine    CBC and differential [[id number]]  (Abnormal) Collected: 06/22/23 1417    Lab Status: Final result Specimen: Blood from Arm, Right Updated: 06/22/23 1423     WBC 6 10 Thousand/uL      RBC 5 47 Million/uL      Hemoglobin 16 3 g/dL      Hematocrit 50 2 %      MCV 92 fL      MCH 29 8 pg      MCHC 32 5 g/dL      RDW 13 1 %      MPV 9 2 fL      Platelets 953 Thousands/uL      nRBC 0 /100 WBCs      Neutrophils Relative 64 %      Immat GRANS % 0 %      Lymphocytes Relative 22 %      Monocytes Relative 10 %      Eosinophils Relative 3 %      Basophils Relative 1 %      Neutrophils Absolute 3 88 Thousands/µL      Immature Grans Absolute 0 01 Thousand/uL      Lymphocytes Absolute 1 35 Thousands/µL      Monocytes Absolute 0 60 Thousand/µL      Eosinophils Absolute 0 21 Thousand/µL      Basophils Absolute 0 05 Thousands/µL                  CT head without contrast   Final Result by Anderson Arroyo MD (06/22 0816)      1  No acute intracranial abnormality    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  2   Nasal polyps again seen  Follow-up with ENT if not already performed  Workstation performed: [id number]                    Procedures  ECG 12 Lead Documentation Only    Date/Time: 6/22/2023 3:08 PM    Performed by: Leopoldo Dukes MD  Authorized by: Leopoldo Dukes MD    ECG reviewed by me, the ED Provider: yes    Patient location:  ED  Rate:     ECG rate assessment: bradycardic    Rhythm:     Rhythm: sinus bradycardia    Ectopy:     Ectopy: none    QRS:     QRS axis:  Normal    QRS intervals:  Normal  Conduction:     Conduction: normal    ST segments:     ST segments:  Normal  Comments:      Sinus bradycardia 54, no acute ischemic changes             ED Course  ED Course as of 06/22/23 1616   Thu Jun 22, 2023   1528 Head CT independently reviewed by myself, no acute abnormality noted  Pending radiology read  SBIRT 22yo+    Flowsheet Row Most Recent Value   Initial Alcohol Screen: US AUDIT-C     1  How often do you have a drink containing alcohol? 0 Filed at: 06/22/2023 1350   2  How many drinks containing alcohol do you have on a typical day you are drinking? 0 Filed at: 06/22/2023 1350   3a  Male UNDER 65: How often do you have five or more drinks on one occasion? 0 Filed at: 06/22/2023 1350   Audit-C Score 0 Filed at: 06/22/2023 1350   FRED: How many times in the past year have you    Used an illegal drug or used a prescription medication for non-medical reasons? Never Filed at: 06/22/2023 1350                    Medical Decision Making  66-year-old male, history of coronary artery disease with stent, carotid enterectomy and CVA, presenting with 2 weeks of generalized weakness and intermittent speech difficulty  Differential diagnosis includes CVA, TIA, anemia, dehydration among other diagnoses  On exam, patient awake and alert, has normal speech, no focal neurologic deficits on exam, normal gait    Patient reports history of CVA that caused speech difficulty, wife states he has been under a lot of stress lately which may be contributing to his current symptoms  Sinus bradycardia on cardiac monitor with stable blood pressure  Head CT, EKG, labs ordered, will continue to monitor in ED and reevaluate  Patient has been awake and alert since arrival to ED, normal speech, able to ambulate without difficulty  Head CT shows old stroke, no acute findings  Lab results reviewed and discussed with patient, no acute abnormality  Patient's symptoms may be due to old stroke, recent stress cause an exacerbation  Patient feels well to be discharged home, discussed with patient and wife follow-up with primary care doctor, instructed to return for any worsening or new concerning symptoms  I have reviewed test results and diagnosis with patient  Follow-up plan reviewed  Precautions for acute return for re-evaluation are reviewed  Opportunity to ask questions was provided  Patient verbalizes understanding  Amount and/or Complexity of Data Reviewed  Independent Historian: spouse  Labs: ordered  Decision-making details documented in ED Course  Radiology: ordered  Decision-making details documented in ED Course  ECG/medicine tests: ordered and independent interpretation performed  Decision-making details documented in ED Course  Disposition  Final diagnoses:   Weakness     Time reflects when diagnosis was documented in both MDM as applicable and the Disposition within this note     Time User Action Codes Description Comment    6/22/2023  4:12 PM hSawn Navarro Add [R53 1] Weakness       ED Disposition     ED Disposition   Discharge    Condition   Stable    Date/Time   Thu Jun 22, 2023  4:12 PM    Comment   Marylee Faith discharge to home/self care                 Follow-up Information     Follow up With Specialties Details Why Parveen Redd MD Internal Medicine Schedule an appointment as soon as possible for a visit   [address]  [phone]            Patient's Medications   Discharge Prescriptions    No medications on file       No discharge procedures on file      PDMP Review     None          ED Provider  Electronically Signed by           Keila Anderson MD  06/22/23 2387

## 2023-06-23 LAB
ATRIAL RATE: 54 BPM
P AXIS: 79 DEGREES
PR INTERVAL: 162 MS
QRS AXIS: 32 DEGREES
QRSD INTERVAL: 98 MS
QT INTERVAL: 432 MS
QTC INTERVAL: 409 MS
T WAVE AXIS: 44 DEGREES
VENTRICULAR RATE: 54 BPM

## 2023-06-23 PROCEDURE — 93010 ELECTROCARDIOGRAM REPORT: CPT | Performed by: INTERNAL MEDICINE

## 2023-08-23 ENCOUNTER — HOSPITAL ENCOUNTER (EMERGENCY)
Facility: HOSPITAL | Age: 68
Discharge: HOME | End: 2023-08-23
Attending: EMERGENCY MEDICINE
Payer: COMMERCIAL

## 2023-08-23 ENCOUNTER — HOSPITAL ENCOUNTER (OUTPATIENT)
Dept: SLEEP CENTER | Facility: HOSPITAL | Age: 68
Discharge: HOME | End: 2023-08-23
Attending: INTERNAL MEDICINE

## 2023-08-23 VITALS
WEIGHT: 215 LBS | BODY MASS INDEX: 26.18 KG/M2 | TEMPERATURE: 98.5 F | HEIGHT: 76 IN | OXYGEN SATURATION: 98 % | DIASTOLIC BLOOD PRESSURE: 65 MMHG | HEART RATE: 68 BPM | SYSTOLIC BLOOD PRESSURE: 148 MMHG | RESPIRATION RATE: 20 BRPM

## 2023-08-23 DIAGNOSIS — E80.6 HYPERBILIRUBINEMIA: ICD-10-CM

## 2023-08-23 DIAGNOSIS — R11.0 NAUSEA: Primary | ICD-10-CM

## 2023-08-23 LAB
ALBUMIN SERPL BCP-MCNC: 4.2 G/DL (ref 3.5–5)
ALP SERPL-CCNC: 62 U/L (ref 34–104)
ALT SERPL W P-5'-P-CCNC: 9 U/L (ref 7–52)
ANION GAP SERPL CALCULATED.3IONS-SCNC: 9 MMOL/L
AST SERPL W P-5'-P-CCNC: 20 U/L (ref 13–39)
BASOPHILS NFR BLD AUTO: 0 % (ref 0–1)
BILIRUB SERPL-MCNC: 2.32 MG/DL (ref 0.2–1)
BUN SERPL-MCNC: 19 MG/DL (ref 5–25)
CALCIUM SERPL-MCNC: 8.9 MG/DL (ref 8.4–10.2)
CARDIAC TROPONIN I PNL SERPL HS: 4 NG/L
CHLORIDE SERPL-SCNC: 98 MMOL/L (ref 96–108)
CO2 SERPL-SCNC: 30 MMOL/L (ref 21–32)
CREAT SERPL-MCNC: 1.1 MG/DL (ref 0.6–1.3)
EOSINOPHIL NFR BLD AUTO: 0 % (ref 0–6)
ERYTHROCYTE [DISTWIDTH] IN BLOOD BY AUTOMATED COUNT: 13.2 % (ref 11.6–15.1)
GFR SERPL CREATININE-BSD FRML MDRD: 68 ML/MIN/1.73SQ M
GLUCOSE SERPL-MCNC: 108 MG/DL (ref 65–140)
HCT VFR BLD AUTO: 48.1 % (ref 36.5–49.3)
HGB BLD-MCNC: 15.6 G/DL (ref 12–17)
LIPASE SERPL-CCNC: 17 U/L (ref 11–82)
LYMPHOCYTES NFR BLD AUTO: 5 % (ref 14–44)
MCH RBC QN AUTO: 30.1 PG (ref 26.8–34.3)
MCHC RBC AUTO-ENTMCNC: 32.4 G/DL (ref 31.4–37.4)
MCV RBC AUTO: 93 FL (ref 82–98)
MONOCYTES NFR BLD AUTO: 5 % (ref 4–12)
NEUTS SEG NFR BLD AUTO: 90 % (ref 43–75)
PLATELET # BLD AUTO: 192 THOUSANDS/UL (ref 149–390)
PMV BLD AUTO: 9.5 FL (ref 8.9–12.7)
POTASSIUM SERPL-SCNC: 4 MMOL/L (ref 3.5–5.3)
PROT SERPL-MCNC: 6.8 G/DL (ref 6.4–8.4)
RBC # BLD AUTO: 5.19 MILLION/UL (ref 3.88–5.62)
SODIUM SERPL-SCNC: 137 MMOL/L (ref 135–147)
WBC # BLD AUTO: 9.42 THOUSAND/UL (ref 4.31–10.16)

## 2023-08-23 PROCEDURE — 36415 COLL VENOUS BLD VENIPUNCTURE: CPT | Performed by: EMERGENCY MEDICINE

## 2023-08-23 PROCEDURE — 99285 EMERGENCY DEPT VISIT HI MDM: CPT | Performed by: EMERGENCY MEDICINE

## 2023-08-23 PROCEDURE — 85025 COMPLETE CBC W/AUTO DIFF WBC: CPT | Performed by: EMERGENCY MEDICINE

## 2023-08-23 PROCEDURE — 99285 EMERGENCY DEPT VISIT HI MDM: CPT

## 2023-08-23 PROCEDURE — 83690 ASSAY OF LIPASE: CPT | Performed by: EMERGENCY MEDICINE

## 2023-08-23 PROCEDURE — 96374 THER/PROPH/DIAG INJ IV PUSH: CPT

## 2023-08-23 PROCEDURE — 80053 COMPREHEN METABOLIC PANEL: CPT | Performed by: EMERGENCY MEDICINE

## 2023-08-23 PROCEDURE — 84484 ASSAY OF TROPONIN QUANT: CPT | Performed by: EMERGENCY MEDICINE

## 2023-08-23 PROCEDURE — 93005 ELECTROCARDIOGRAM TRACING: CPT

## 2023-08-23 PROCEDURE — 96361 HYDRATE IV INFUSION ADD-ON: CPT

## 2023-08-23 RX ORDER — ONDANSETRON 2 MG/ML
4 INJECTION INTRAMUSCULAR; INTRAVENOUS ONCE
Status: COMPLETED | OUTPATIENT
Start: 2023-08-23 | End: 2023-08-23

## 2023-08-23 RX ADMIN — ONDANSETRON 4 MG: 2 INJECTION INTRAMUSCULAR; INTRAVENOUS at 20:44

## 2023-08-23 RX ADMIN — SODIUM CHLORIDE 1000 ML: 0.9 INJECTION, SOLUTION INTRAVENOUS at 20:44

## 2023-08-24 LAB
ATRIAL RATE: 72 BPM
P AXIS: 85 DEGREES
PR INTERVAL: 162 MS
QRS AXIS: 67 DEGREES
QRSD INTERVAL: 94 MS
QT INTERVAL: 370 MS
QTC INTERVAL: 405 MS
T WAVE AXIS: 60 DEGREES
VENTRICULAR RATE: 72 BPM

## 2023-08-24 PROCEDURE — 93010 ELECTROCARDIOGRAM REPORT: CPT | Performed by: INTERNAL MEDICINE

## 2023-08-24 NOTE — PROGRESS NOTES
Nelsy Orlando was scheduled for a sleep study this evening. His wife called in saying he was feeling nausous/sick almost exactly the way he felt when he was having his heart attack and that he was going to the ER to be seen. Nelsy Orlando is going to call back to reschedule the appointment. I also made my manager aware of situation and will get the appointment rescheduled.

## 2023-08-24 NOTE — ED PROVIDER NOTES
History  Chief Complaint   Patient presents with   • Nausea     Pt presents with nausea that started this AM and feels that he is slurring his words, states these are the same symptoms he had with his heart attack,      58-year-old male presents for evaluation. Patient is concerned for heart attack. He states he experienced a heart attack in 2019 and then had bypass surgery in 2020. He reports his only symptom at that time was nausea as well as slurred speech. Patient reports noticing this morning he was unable to say "supposed"; he denies other speech disturbances. Patient also developed nausea this morning with a lack of appetite. He had no vomiting episodes, denies chest pain or dyspnea. Patient denies abdominal pain. He further denies headaches, lightheadedness, neck or back pain, extremity numbness tingling or weakness. Prior to Admission Medications   Prescriptions Last Dose Informant Patient Reported? Taking? Symbicort 160-4.5 MCG/ACT inhaler  Self No No   Sig: Inhale 2 puffs 2 (two) times a day Rinse mouth after use.    albuterol (PROVENTIL HFA,VENTOLIN HFA) 90 mcg/act inhaler  Self No No   Sig: Inhale 2 puffs every 6 (six) hours as needed for wheezing   aspirin (ECOTRIN LOW STRENGTH) 81 mg EC tablet  Self No No   Sig: Take 1 tablet (81 mg total) by mouth daily   atorvastatin (LIPITOR) 80 mg tablet   No No   Sig: Take 1 tablet (80 mg total) by mouth every evening   carvedilol (COREG) 12.5 mg tablet   No No   Sig: Take 1 tablet (12.5 mg total) by mouth 2 (two) times a day with meals   ezetimibe (ZETIA) 10 mg tablet  Self No No   Sig: Take 1 tablet (10 mg total) by mouth daily Take one tablet by mouth once daily at night.   losartan-hydrochlorothiazide (HYZAAR) 50-12.5 mg per tablet   No No   Sig: Take 1 tablet by mouth daily   nitroglycerin (NITROSTAT) 0.4 mg SL tablet   No No   Sig: Place 1 tablet (0.4 mg total) under the tongue every 5 (five) minutes as needed for chest pain   ticagrelor (BRILINTA) 90 MG  Self No No   Sig: Take 1 tablet (90 mg total) by mouth every 12 (twelve) hours      Facility-Administered Medications: None       Past Medical History:   Diagnosis Date   • Asthma    • Atrial fibrillation ([address])    • Coronary artery disease    • Fatigue    • HTN (hypertension)    • Hyperlipidemia    • Myocardial infarction ([address]) 02/2019   • Stroke [facility])    • TIA (transient ischemic attack)        Past Surgical History:   Procedure Laterality Date   • APPENDECTOMY     • CARDIAC CATHETERIZATION  04/17/2020    RCA: 80% distal lesion. LCX/OM2 stent placement. LAD with 70-80% lesion at D1 bifurcation. • CARDIAC CATHETERIZATION  2019    OM2 and proximal LCX stenting. • CATARACT EXTRACTION Right    • CORONARY ARTERY BYPASS GRAFT     • CORONARY STENT PLACEMENT  2019    OM2 and proximal LCX stenting   • [place] TEAEC W/PATCH GRF CAROTID VERTB SUBCLAV NECK INC Left 6/18/2022    Procedure: ENDARTERECTOMY ARTERY CAROTID WITH PATCH ANGIOPLASTY;  Surgeon: Dyllan Griffiths DO;  Location: [facility];  Service: Vascular       Family History   Problem Relation Age of Onset   • Heart disease Mother    • Kidney disease Mother    • Heart disease Father      I have reviewed and agree with the history as documented. E-Cigarette/Vaping   • E-Cigarette Use Never User      E-Cigarette/Vaping Substances   • Nicotine No    • THC No    • CBD No    • Flavoring No    • Other No    • Unknown No      Social History     Tobacco Use   • Smoking status: Never   • Smokeless tobacco: Never   Vaping Use   • Vaping Use: Never used   Substance Use Topics   • Alcohol use: Never   • Drug use: Never       Review of Systems   Constitutional: Negative for activity change, appetite change, chills and fever. Respiratory: Negative for shortness of breath. Cardiovascular: Negative for chest pain. Gastrointestinal: Positive for nausea. Negative for abdominal pain and vomiting. Musculoskeletal: Negative for back pain and neck pain. Neurological: Negative for weakness, light-headedness, numbness and headaches. All other systems reviewed and are negative. Physical Exam  Physical Exam  Vitals reviewed. Constitutional:       General: He is not in acute distress. Appearance: Normal appearance. He is not ill-appearing, toxic-appearing or diaphoretic. HENT:      Head: Normocephalic and atraumatic. Right Ear: External ear normal.      Left Ear: External ear normal.   Eyes:      General:         Right eye: No discharge. Left eye: No discharge. Extraocular Movements: Extraocular movements intact. Cardiovascular:      Rate and Rhythm: Normal rate and regular rhythm. Pulses: Normal pulses. Heart sounds: Normal heart sounds. Pulmonary:      Effort: Pulmonary effort is normal. No respiratory distress. Breath sounds: Normal breath sounds. Abdominal:      General: There is no distension. Palpations: Abdomen is soft. Tenderness: There is no abdominal tenderness. There is no guarding or rebound. Musculoskeletal:         General: No deformity or signs of injury. Right lower leg: No edema. Left lower leg: No edema. Skin:     General: Skin is warm. Coloration: Skin is not jaundiced or pale. Neurological:      General: No focal deficit present. Mental Status: He is alert. Mental status is at baseline. Cranial Nerves: No cranial nerve deficit. Sensory: No sensory deficit. Motor: No weakness.       Gait: Gait normal.         Vital Signs  ED Triage Vitals [08/23/23 1949]   Temperature Pulse Respirations Blood Pressure SpO2   98.5 °F (36.9 °C) 81 18 (!) 186/86 98 %      Temp Source Heart Rate Source Patient Position - Orthostatic VS BP Location FiO2 (%)   Temporal Monitor Lying Left arm --      Pain Score       No Pain           Vitals:    08/23/23 1949 08/23/23 2130   BP: (!) 186/86 148/65   Pulse: 81 68   Patient Position - Orthostatic VS: Lying Lying Visual Acuity      ED Medications  Medications   sodium chloride 0.9 % bolus 1,000 mL (0 mL Intravenous Stopped 8/23/23 2146)   ondansetron (ZOFRAN) injection 4 mg (4 mg Intravenous Given 8/23/23 2044)       Diagnostic Studies  Results Reviewed     Procedure Component Value Units Date/Time    CBC and differential [[id number]]  (Abnormal) Collected: 08/23/23 2044    Lab Status: Edited Result - FINAL Specimen: Blood from Arm, Left Updated: 08/23/23 2136     WBC 9.42 Thousand/uL      RBC 5.19 Million/uL      Hemoglobin 15.6 g/dL      Hematocrit 48.1 %      MCV 93 fL      MCH 30.1 pg      MCHC 32.4 g/dL      RDW 13.2 %      MPV 9.5 fL      Platelets 128 Thousands/uL      Neutrophils Relative 90 %      Lymphocytes Relative 5 %      Monocytes Relative 5 %      Eosinophils Relative 0 %      Basophils Relative 0 %     Narrative: This is a modified report.   Previous result was Hemogram + Auto diff on 8/23/2023 at 2130 EDT    HS Troponin 0hr (reflex protocol) [[id number]]  (Normal) Collected: 08/23/23 2044    Lab Status: Final result Specimen: Blood from Arm, Left Updated: 08/23/23 2120     hs TnI 0hr 4 ng/L     Comprehensive metabolic panel [[id number]]  (Abnormal) Collected: 08/23/23 2044    Lab Status: Final result Specimen: Blood from Arm, Left Updated: 08/23/23 2114     Sodium 137 mmol/L      Potassium 4.0 mmol/L      Chloride 98 mmol/L      CO2 30 mmol/L      ANION GAP 9 mmol/L      BUN 19 mg/dL      Creatinine 1.10 mg/dL      Glucose 108 mg/dL      Calcium 8.9 mg/dL      AST 20 U/L      ALT 9 U/L      Alkaline Phosphatase 62 U/L      Total Protein 6.8 g/dL      Albumin 4.2 g/dL      Total Bilirubin 2.32 mg/dL      eGFR 68 ml/min/1.73sq m     Narrative:      [place] guidelines for Chronic Kidney Disease (CKD):   •  Stage 1 with normal or high GFR (GFR > 90 mL/min/1.73 square meters)  •  Stage 2 Mild CKD (GFR = 60-89 mL/min/1.73 square meters)  •  Stage 3A Moderate CKD (GFR = 45-59 mL/min/1.73 square meters)  •  Stage 3B Moderate CKD (GFR = 30-44 mL/min/1.73 square meters)  •  Stage 4 Severe CKD (GFR = 15-29 mL/min/1.73 square meters)  •  Stage 5 End Stage CKD (GFR <15 mL/min/1.73 square meters)  Note: GFR calculation is accurate only with a steady state creatinine    Lipase [[id number]]  (Normal) Collected: 08/23/23 2044    Lab Status: Final result Specimen: Blood from Arm, Left Updated: 08/23/23 2114     Lipase 17 u/L                  No orders to display              Procedures  Procedures         ED Course  ED Course as of 08/24/23 1041   Wed Aug 23, 2023   2036 Procedure Note: EKG  Date/Time: 08/23/23 8:36 PM   Interpreted by: Santa Markham  Indications / Diagnosis: nausea  ECG reviewed by me, the ED Provider: yes   The EKG demonstrates:  Rhythm: normal sinus  Intervals: normal intervals  Axis: normal axis  QRS/Blocks: normal QRS  ST Changes: No acute ST Changes, no STD/YOLANDA. No acute changes from previous EKG in June 2023.       2108 CBC and differential  Within normal   2118 Lipase: 17  negative   2118 TOTAL BILIRUBIN(!): 2.32  Abnormal, previously 1.8 on last CMP; on review, all previous CMP since 2021 showing elevated bili   2120 hs TnI 0hr: 4  negative   2122 Discussed results with patient, he is aware of bilirubin elevations, states he has had high levels "my whole life"                               SBIRT 20yo+    Flowsheet Row Most Recent Value   Initial Alcohol Screen: US AUDIT-C     1. How often do you have a drink containing alcohol? 0 Filed at: 08/23/2023 2016   2. How many drinks containing alcohol do you have on a typical day you are drinking? 0 Filed at: 08/23/2023 2016   3b. FEMALE Any Age, or MALE 65+: How often do you have 4 or more drinks on one occassion? 0 Filed at: 08/23/2023 2016   Audit-C Score 0 Filed at: 08/23/2023 2016   FRED: How many times in the past year have you. .. Used an illegal drug or used a prescription medication for non-medical reasons?  Never Filed at: 08/23/2023 2016                    Medical Decision Making  71-year-old male presents for evaluation. Patient reports concern for heart attack he had previously suffered 1, at that time his only symptom was nausea. He reports nausea today. He is otherwise well-appearing on examination. Will assess for ACS, symptoms have been ongoing for duration of the day and thus only 1 troponin should be adequate for ACS. Will additionally evaluate for intra-abdominal pathology with lipase, liver function as a source of the nausea. Check CBC for anemia. Uncertain etiology of speech disturbance this morning however do not believe this truly represents a CVA as it was only one word difficulty, at this time patient is neurologically intact. Chart review shows patient had a relatively normal stress echo earlier in the year; there was evidence of isolated wall hypokinesis at rest but not when stressed, other cardiac function within normal.    Amount and/or Complexity of Data Reviewed  Labs: ordered. Decision-making details documented in ED Course. Risk  Prescription drug management. Disposition  Final diagnoses:   Nausea   Hyperbilirubinemia     Time reflects when diagnosis was documented in both MDM as applicable and the Disposition within this note     Time User Action Codes Description Comment    8/23/2023  9:27 PM Joe Ohara Add [R11.0] Nausea     8/23/2023  9:27 PM Joe Ohara Add [E80.6] Hyperbilirubinemia       ED Disposition     ED Disposition   Discharge    Condition   Stable    Date/Time   Wed Aug 23, 2023  9:27 PM    Comment   Diana Rangel discharge to home/self care.                Follow-up Information     Follow up With Specialties Details Why Contact Info    Kiran Dinero MD Internal Medicine Schedule an appointment as soon as possible for a visit   [facility]  [phone]            Discharge Medication List as of 8/23/2023  9:28 PM CONTINUE these medications which have NOT CHANGED    Details   albuterol (PROVENTIL HFA,VENTOLIN HFA) 90 mcg/act inhaler Inhale 2 puffs every 6 (six) hours as needed for wheezing, Starting Thu 12/10/2020, Normal      aspirin (ECOTRIN LOW STRENGTH) 81 mg EC tablet Take 1 tablet (81 mg total) by mouth daily, Starting Thu 7/28/2022, Normal      atorvastatin (LIPITOR) 80 mg tablet Take 1 tablet (80 mg total) by mouth every evening, Starting Mon 4/10/2023, Until Fri 6/2/2023, Normal      carvedilol (COREG) 12.5 mg tablet Take 1 tablet (12.5 mg total) by mouth 2 (two) times a day with meals, Starting Fri 6/2/2023, No Print      ezetimibe (ZETIA) 10 mg tablet Take 1 tablet (10 mg total) by mouth daily Take one tablet by mouth once daily at night., Starting Thu 7/28/2022, Normal      losartan-hydrochlorothiazide (HYZAAR) 50-12.5 mg per tablet Take 1 tablet by mouth daily, Starting Mon 4/10/2023, Until Fri 6/2/2023, Normal      nitroglycerin (NITROSTAT) 0.4 mg SL tablet Place 1 tablet (0.4 mg total) under the tongue every 5 (five) minutes as needed for chest pain, Starting Mon 5/1/2023, Normal      Symbicort 160-4.5 MCG/ACT inhaler Inhale 2 puffs 2 (two) times a day Rinse mouth after use., Normal      ticagrelor (BRILINTA) 90 MG Take 1 tablet (90 mg total) by mouth every 12 (twelve) hours, Starting u 7/28/2022, Until Fri 6/2/2023, Normal             No discharge procedures on file.     PDMP Review     None          ED Provider  Electronically Signed by           Jeison Bolanos DO  08/24/23 7283

## 2023-08-25 DIAGNOSIS — I10 ESSENTIAL HYPERTENSION: ICD-10-CM

## 2023-08-25 RX ORDER — CARVEDILOL 12.5 MG/1
TABLET ORAL
Qty: 180 TABLET | Refills: 0 | Status: SHIPPED | OUTPATIENT
Start: 2023-08-25

## 2023-09-11 DIAGNOSIS — E78.5 DYSLIPIDEMIA: ICD-10-CM

## 2023-09-11 RX ORDER — EZETIMIBE 10 MG/1
10 TABLET ORAL DAILY
Qty: 90 TABLET | Refills: 0 | Status: SHIPPED | OUTPATIENT
Start: 2023-09-11

## 2023-09-25 ENCOUNTER — VBI (OUTPATIENT)
Dept: ADMINISTRATIVE | Facility: OTHER | Age: 68
End: 2023-09-25

## 2023-11-20 DIAGNOSIS — I10 ESSENTIAL HYPERTENSION: ICD-10-CM

## 2023-11-20 DIAGNOSIS — I25.10 CORONARY ARTERY DISEASE INVOLVING NATIVE CORONARY ARTERY OF NATIVE HEART WITHOUT ANGINA PECTORIS: ICD-10-CM

## 2023-11-20 RX ORDER — CARVEDILOL 12.5 MG/1
TABLET ORAL
Qty: 180 TABLET | Refills: 3 | Status: SHIPPED | OUTPATIENT
Start: 2023-11-20

## 2023-11-20 RX ORDER — NITROGLYCERIN 0.4 MG/1
0.4 TABLET SUBLINGUAL
Qty: 25 TABLET | Refills: 1 | Status: SHIPPED | OUTPATIENT
Start: 2023-11-20

## 2023-12-01 ENCOUNTER — TELEPHONE (OUTPATIENT)
Dept: CARDIOLOGY CLINIC | Facility: HOSPITAL | Age: 68
End: 2023-12-01

## 2023-12-01 NOTE — TELEPHONE ENCOUNTER
Attempted to contact this patient to schedule an appointment with cardiology. A letter has been sent to have them contact our office.

## 2024-01-05 ENCOUNTER — OFFICE VISIT (OUTPATIENT)
Dept: VASCULAR SURGERY | Facility: CLINIC | Age: 69
End: 2024-01-05
Payer: COMMERCIAL

## 2024-01-05 VITALS
SYSTOLIC BLOOD PRESSURE: 122 MMHG | WEIGHT: 221.4 LBS | BODY MASS INDEX: 26.96 KG/M2 | TEMPERATURE: 97.5 F | DIASTOLIC BLOOD PRESSURE: 80 MMHG | HEIGHT: 76 IN | HEART RATE: 65 BPM | OXYGEN SATURATION: 94 % | RESPIRATION RATE: 16 BRPM

## 2024-01-05 DIAGNOSIS — I65.22 STENOSIS OF LEFT CAROTID ARTERY: Primary | ICD-10-CM

## 2024-01-05 DIAGNOSIS — R73.03 PREDIABETES: ICD-10-CM

## 2024-01-05 PROCEDURE — 99213 OFFICE O/P EST LOW 20 MIN: CPT

## 2024-01-05 NOTE — ASSESSMENT & PLAN NOTE
68-year-old male w/HTN, HLD, CAD s/p CABG, atrial fibrillation, history of CVA in the setting of symptomatic left ICA stenosis, now s/p left CEA on 06/18/2022 by Dr. Lind. He presents today for results review of CV duplex done 6/22/23.   -CV duplex demonstrates widely patent L ICA endarterectomy site 96/21 and <50% R ICA stenosis (judy 81/22). Vertebral flow antegrade bilaterally.   -Asymptomatic. Denies TIA/CVA symptoms.   -Discussed the pathophysiology of carotid stenosis, available treatment options, and indications for intervention.   -No surgical intervention recommended at this time.   -Obtain updated carotid artery duplex to evaluate ICA velocities and progression of disease. Due June 2024.  -Continue medical optimization with ASA 81 mg and statin.   -Educated on s/sx of worsening condition, including TIA/Stroke-like symptoms, and to call 911 or go to the ED if symptoms occur.

## 2024-01-05 NOTE — PATIENT INSTRUCTIONS
-Discussed the pathophysiology of carotid stenosis, available treatment options, and indications for intervention.   -No surgical intervention indication.   -Obtain updated carotid artery duplex in 1 year to evaluate ICA velocities and progression of disease. Due June 2024.   -Continue medical optimization with ASA 81 mg and statin.   -Educated on s/sx of worsening condition, including TIA/Stroke-like symptoms, and to call 911 or go to the ED if symptoms occur.

## 2024-01-05 NOTE — PROGRESS NOTES
Assessment/Plan:    Carotid artery stenosis  68-year-old male w/HTN, HLD, CAD s/p CABG, atrial fibrillation, history of CVA in the setting of symptomatic left ICA stenosis, now s/p left CEA on 06/18/2022 by Dr. Lind. He presents today for results review of CV duplex done 6/22/23.   -CV duplex demonstrates widely patent L ICA endarterectomy site 96/21 and <50% R ICA stenosis (judy 81/22). Vertebral flow antegrade bilaterally.   -Asymptomatic. Denies recent TIA/CVA symptoms. No residual CVA deficits.   -Discussed the pathophysiology of carotid stenosis, available treatment options, and indications for intervention.   -No surgical intervention recommended at this time.   -Obtain updated carotid artery duplex to evaluate ICA velocities and progression of disease. Due June 2024.  -Continue medical optimization with ASA 81 mg and statin.   -Low-fat, low-sodium diet and regular exercise.   -Adequate BP control, mgmt per PCP.   -Patient would like to establish care with a [facility] PCP. Referral placed today.   -Educated on s/sx of worsening condition, including TIA/Stroke-like symptoms, and to call 911 or go to the ED if symptoms occur.        Diagnoses and all orders for this visit:    Stenosis of left carotid artery  -     VAS carotid complete study; Future    Prediabetes  -     Ambulatory Referral to Family Practice; Future          Subjective:      Patient ID: Myles Vaughan is a 68 y.o. male.    GOPI Bueno is a 68-year-old male with a history of symptomatic left ICA stenosis s/p left CEA 6/18/2022 by Dr. Lind.  He presents today for results review of CV duplex done on 6/20/2023 for surveillance.  He denies any TIA/CVA symptoms including amaurosis fugax, facial drooping, slurred speech, or lateralizing symptoms.  He denies any residual CVA symptoms.  He is overall doing well, reports losing 25 pounds due to dietary and lifestyle modifications.  He is taking aspirin 81 mg and atorvastatin.        The  "following portions of the patient's history were reviewed and updated as appropriate: allergies, current medications, past family history, past medical history, past social history, past surgical history, and problem list.    Review of Systems   Constitutional: Negative.    HENT: Negative.     Eyes: Negative.    Respiratory: Negative.     Cardiovascular: Negative.    Gastrointestinal: Negative.    Endocrine: Negative.    Genitourinary: Negative.    Skin: Negative.    Allergic/Immunologic: Positive for food allergies.   Neurological: Negative.    Hematological: Negative.    Psychiatric/Behavioral: Negative.         I have personally reviewed and made appropriate changes to the ROS that was input by the medical assistant.     Objective:      Vitals:    01/05/24 1524   BP: 122/80   BP Location: Left arm   Patient Position: Sitting   Cuff Size: Adult   Pulse: 65   Resp: 16   Temp: 97.5 °F (36.4 °C)   TempSrc: Temporal   SpO2: 94%   Weight: 100 kg (221 lb 6.4 oz)   Height: 6' 4\" (1.93 m)       Patient Active Problem List   Diagnosis    S/P CABG (coronary artery bypass graft)    Uncomplicated asthma    Dyslipidemia    Coronary artery disease involving native coronary artery of native heart without angina pectoris    Benign essential hypertension    Chest pain    History of stroke    Carotid artery stenosis    Thyroid nodule    Paroxysmal atrial fibrillation (HCC)    Elevated serum creatinine    Hypokalemia    Leukocytosis    Multiple thyroid nodules    Prediabetes    History of left-sided carotid endarterectomy       Past Surgical History:   Procedure Laterality Date    APPENDECTOMY      CARDIAC CATHETERIZATION  04/17/2020    RCA: 80% distal lesion. LCX/OM2 stent placement. LAD with 70-80% lesion at D1 bifurcation.    CARDIAC CATHETERIZATION  2019    OM2 and proximal LCX stenting.    CATARACT EXTRACTION Right     CORONARY ARTERY BYPASS GRAFT      CORONARY STENT PLACEMENT  2019    OM2 and proximal LCX stenting    [place] TEAEC " W/PATCH GRF CAROTID VERTB SUBCLAV NECK INC Left 6/18/2022    Procedure: ENDARTERECTOMY ARTERY CAROTID WITH PATCH ANGIOPLASTY;  Surgeon: Diane Griffin DO;  Location: [facility];  Service: Vascular       Family History   Problem Relation Age of Onset    Heart disease Mother     Kidney disease Mother     Heart disease Father        Social History     Socioeconomic History    Marital status: /Civil Union     Spouse name: Not on file    Number of children: Not on file    Years of education: Not on file    Highest education level: Not on file   Occupational History    Not on file   Tobacco Use    Smoking status: Never    Smokeless tobacco: Never   Vaping Use    Vaping status: Never Used   Substance and Sexual Activity    Alcohol use: Never    Drug use: Never    Sexual activity: Not Currently   Other Topics Concern    Not on file   Social History Narrative    Not on file     Social Determinants of Health     Financial Resource Strain: Not on file   Food Insecurity: No Food Insecurity (6/16/2022)    Hunger Vital Sign     Worried About Running Out of Food in the Last Year: Never true     Ran Out of Food in the Last Year: Never true   Transportation Needs: No Transportation Needs (6/16/2022)    PRAPARE - Transportation     Lack of Transportation (Medical): No     Lack of Transportation (Non-Medical): No   Physical Activity: Not on file   Stress: Not on file   Social Connections: Not on file   Intimate Partner Violence: Not on file   Housing Stability: Low Risk  (6/16/2022)    Housing Stability Vital Sign     Unable to Pay for Housing in the Last Year: No     Number of Places Lived in the Last Year: 1     Unstable Housing in the Last Year: No       Allergies   Allergen Reactions    Eggs Or Egg-Derived Products - Food Allergy Shortness Of Breath         Current Outpatient Medications:     albuterol (PROVENTIL HFA,VENTOLIN HFA) 90 mcg/act inhaler, Inhale 2 puffs every 6 (six) hours as needed for wheezing, Disp: 1  "Inhaler, Rfl: 3    aspirin (ECOTRIN LOW STRENGTH) 81 mg EC tablet, Take 1 tablet (81 mg total) by mouth daily, Disp: 90 tablet, Rfl: 3    atorvastatin (LIPITOR) 80 mg tablet, Take 1 tablet (80 mg total) by mouth every evening, Disp: 90 tablet, Rfl: 3    carvedilol (COREG) 12.5 mg tablet, TAKE ONE (1) TABLET (12.5 MG TOTAL) BY MOUTH TWO (2) (TWO) TIMES A DAY WITH MEALS, Disp: 180 tablet, Rfl: 3    ezetimibe (ZETIA) 10 mg tablet, Take 1 tablet (10 mg total) by mouth daily Take one tablet by mouth once daily at night., Disp: 90 tablet, Rfl: 0    losartan-hydrochlorothiazide (HYZAAR) 50-12.5 mg per tablet, Take 1 tablet by mouth daily, Disp: 90 tablet, Rfl: 3    nitroglycerin (NITROSTAT) 0.4 mg SL tablet, Place 1 tablet (0.4 mg total) under the tongue every 5 (five) minutes as needed for chest pain Place one tablet under the tongue every five minutes times three. If no relief after second tablet call 911., Disp: 25 tablet, Rfl: 1    Symbicort 160-4.5 MCG/ACT inhaler, Inhale 2 puffs 2 (two) times a day Rinse mouth after use., Disp: 10.2 g, Rfl: 0    ticagrelor (BRILINTA) 90 MG, Take 1 tablet (90 mg total) by mouth every 12 (twelve) hours (Patient not taking: Reported on 1/5/2024), Disp: 180 tablet, Rfl: 3      /80 (BP Location: Left arm, Patient Position: Sitting, Cuff Size: Adult)   Pulse 65   Temp 97.5 °F (36.4 °C) (Temporal)   Resp 16   Ht 6' 4\" (1.93 m)   Wt 100 kg (221 lb 6.4 oz)   SpO2 94%   BMI 26.95 kg/m²          Physical Exam  Vitals and nursing note reviewed.   Constitutional:       Appearance: Normal appearance.   HENT:      Head: Normocephalic and atraumatic.   Neck:      Vascular: No carotid bruit.      Comments: L CEA incision   Cardiovascular:      Rate and Rhythm: Normal rate and regular rhythm.      Pulses:           Carotid pulses are 2+ on the right side and 2+ on the left side.       Radial pulses are 2+ on the right side and 2+ on the left side.      Heart sounds: Normal heart sounds. "      Comments: No carotid bruit   Pulmonary:      Effort: Pulmonary effort is normal. No respiratory distress.      Breath sounds: Normal breath sounds.   Musculoskeletal:         General: No swelling. Normal range of motion.      Cervical back: Normal range of motion and neck supple.   Skin:     General: Skin is warm.      Capillary Refill: Capillary refill takes less than 2 seconds.   Neurological:      General: No focal deficit present.      Mental Status: He is alert and oriented to person, place, and time.   Psychiatric:         Mood and Affect: Mood normal.         Behavior: Behavior normal.         Yesica Solomon PA-C  [facility]  [phone]    I have spent a total time of 20 minutes on 01/05/24 in caring for this patient including Diagnostic results, Prognosis, Risks and benefits of tx options, Instructions for management, Patient and family education, Importance of tx compliance, Risk factor reductions, Impressions, Counseling / Coordination of care, Documenting in the medical record, Reviewing / ordering tests, medicine, procedures  , and Obtaining or reviewing history  .

## 2024-03-26 DIAGNOSIS — I65.29 STENOSIS OF CAROTID ARTERY, UNSPECIFIED LATERALITY: ICD-10-CM

## 2024-03-26 DIAGNOSIS — I63.9 STROKE (HCC): ICD-10-CM

## 2024-03-26 RX ORDER — ATORVASTATIN CALCIUM 80 MG/1
TABLET, FILM COATED ORAL
Qty: 90 TABLET | Refills: 2 | Status: SHIPPED | OUTPATIENT
Start: 2024-03-26

## 2024-04-02 DIAGNOSIS — I10 ESSENTIAL HYPERTENSION: ICD-10-CM

## 2024-04-02 RX ORDER — CARVEDILOL 12.5 MG/1
TABLET ORAL
Qty: 180 TABLET | Refills: 3 | Status: SHIPPED | OUTPATIENT
Start: 2024-04-02

## 2024-04-03 DIAGNOSIS — I25.10 CORONARY ARTERY DISEASE INVOLVING NATIVE CORONARY ARTERY OF NATIVE HEART WITHOUT ANGINA PECTORIS: ICD-10-CM

## 2024-04-04 RX ORDER — NITROGLYCERIN 0.4 MG/1
TABLET SUBLINGUAL
Qty: 25 TABLET | Refills: 1 | Status: SHIPPED | OUTPATIENT
Start: 2024-04-04

## 2024-04-18 DIAGNOSIS — I10 ESSENTIAL HYPERTENSION: ICD-10-CM

## 2024-04-18 RX ORDER — LOSARTAN POTASSIUM AND HYDROCHLOROTHIAZIDE 12.5; 5 MG/1; MG/1
1 TABLET ORAL DAILY
Qty: 90 TABLET | Refills: 0 | Status: SHIPPED | OUTPATIENT
Start: 2024-04-18

## 2024-06-14 ENCOUNTER — HOSPITAL ENCOUNTER (OUTPATIENT)
Dept: NON INVASIVE DIAGNOSTICS | Facility: HOSPITAL | Age: 69
Discharge: HOME | End: 2024-06-14
Payer: COMMERCIAL

## 2024-06-14 DIAGNOSIS — I65.22 STENOSIS OF LEFT CAROTID ARTERY: ICD-10-CM

## 2024-06-14 PROCEDURE — 93880 EXTRACRANIAL BILAT STUDY: CPT | Performed by: SURGERY

## 2024-06-14 PROCEDURE — 93880 EXTRACRANIAL BILAT STUDY: CPT

## 2024-06-18 ENCOUNTER — TELEPHONE (OUTPATIENT)
Dept: VASCULAR SURGERY | Facility: CLINIC | Age: 69
End: 2024-06-18

## 2024-06-18 NOTE — TELEPHONE ENCOUNTER
----- Message from Yesica Solomon PA-C sent at 6/18/2024  3:08 PM EDT -----  Reviewed study. <50% R ICA stenosis with widely patent L CEA site; No change from prior. Repeat in 1 year and follow up after.

## 2024-06-28 DIAGNOSIS — I10 ESSENTIAL HYPERTENSION: ICD-10-CM

## 2024-06-28 RX ORDER — LOSARTAN POTASSIUM AND HYDROCHLOROTHIAZIDE 12.5; 5 MG/1; MG/1
1 TABLET ORAL DAILY
Qty: 90 TABLET | Refills: 3 | Status: SHIPPED | OUTPATIENT
Start: 2024-06-28

## 2024-06-28 NOTE — TELEPHONE ENCOUNTER
Requested medication(s) are due for refill today: Yes  Patient has already received a courtesy refill: No  Other reason request has been forwarded to provider: Patient has appointment for July with dr peña

## 2024-06-28 NOTE — TELEPHONE ENCOUNTER
Refill must be reviewed and completed by the office or provider. The refill is unable to be approved or denied by the medication management team.      Last seen 06.2023, no appointment - Please review to see if the refill is appropriate.

## 2024-07-13 DIAGNOSIS — I25.10 CORONARY ARTERY DISEASE INVOLVING NATIVE CORONARY ARTERY OF NATIVE HEART WITHOUT ANGINA PECTORIS: ICD-10-CM

## 2024-07-15 ENCOUNTER — TELEPHONE (OUTPATIENT)
Age: 69
End: 2024-07-15

## 2024-07-15 RX ORDER — NITROGLYCERIN 0.4 MG/1
TABLET SUBLINGUAL
Qty: 25 TABLET | Refills: 0 | Status: SHIPPED | OUTPATIENT
Start: 2024-07-15

## 2024-07-15 NOTE — TELEPHONE ENCOUNTER
Requested medication(s) are due for refill today: Yes  Patient has already received a courtesy refill: No  Other reason request has been forwarded to provider: Pt is sched in our office on 7/30 with Dr. Clement

## 2024-07-24 ENCOUNTER — APPOINTMENT (OUTPATIENT)
Dept: LAB | Facility: CLINIC | Age: 69
End: 2024-07-24
Payer: COMMERCIAL

## 2024-07-24 ENCOUNTER — OFFICE VISIT (OUTPATIENT)
Dept: FAMILY MEDICINE CLINIC | Facility: CLINIC | Age: 69
End: 2024-07-24
Payer: COMMERCIAL

## 2024-07-24 VITALS
RESPIRATION RATE: 16 BRPM | HEART RATE: 61 BPM | TEMPERATURE: 98 F | OXYGEN SATURATION: 98 % | DIASTOLIC BLOOD PRESSURE: 86 MMHG | WEIGHT: 224.8 LBS | HEIGHT: 76 IN | BODY MASS INDEX: 27.38 KG/M2 | SYSTOLIC BLOOD PRESSURE: 140 MMHG

## 2024-07-24 DIAGNOSIS — J45.30 MILD PERSISTENT ASTHMA WITHOUT COMPLICATION: ICD-10-CM

## 2024-07-24 DIAGNOSIS — R73.03 PREDIABETES: ICD-10-CM

## 2024-07-24 DIAGNOSIS — I10 BENIGN ESSENTIAL HYPERTENSION: ICD-10-CM

## 2024-07-24 DIAGNOSIS — Z12.11 SCREENING FOR COLON CANCER: ICD-10-CM

## 2024-07-24 DIAGNOSIS — Z11.59 NEED FOR HEPATITIS C SCREENING TEST: ICD-10-CM

## 2024-07-24 DIAGNOSIS — Z95.1 S/P CABG (CORONARY ARTERY BYPASS GRAFT): ICD-10-CM

## 2024-07-24 DIAGNOSIS — N40.1 BENIGN PROSTATIC HYPERPLASIA WITH NOCTURIA: ICD-10-CM

## 2024-07-24 DIAGNOSIS — E04.1 THYROID NODULE: ICD-10-CM

## 2024-07-24 DIAGNOSIS — M25.561 CHRONIC PAIN OF RIGHT KNEE: ICD-10-CM

## 2024-07-24 DIAGNOSIS — R35.1 BENIGN PROSTATIC HYPERPLASIA WITH NOCTURIA: ICD-10-CM

## 2024-07-24 DIAGNOSIS — G89.29 CHRONIC PAIN OF RIGHT KNEE: ICD-10-CM

## 2024-07-24 DIAGNOSIS — Z12.5 SCREENING FOR PROSTATE CANCER: ICD-10-CM

## 2024-07-24 DIAGNOSIS — I48.0 PAROXYSMAL ATRIAL FIBRILLATION (HCC): ICD-10-CM

## 2024-07-24 DIAGNOSIS — E78.5 DYSLIPIDEMIA: ICD-10-CM

## 2024-07-24 DIAGNOSIS — Z00.00 ENCOUNTER FOR ANNUAL WELLNESS VISIT (AWV) IN MEDICARE PATIENT: Primary | ICD-10-CM

## 2024-07-24 PROBLEM — R07.9 CHEST PAIN: Status: RESOLVED | Noted: 2021-03-21 | Resolved: 2024-07-24

## 2024-07-24 PROBLEM — D72.829 LEUKOCYTOSIS: Status: RESOLVED | Noted: 2022-06-20 | Resolved: 2024-07-24

## 2024-07-24 PROBLEM — E87.6 HYPOKALEMIA: Status: RESOLVED | Noted: 2022-06-17 | Resolved: 2024-07-24

## 2024-07-24 PROBLEM — E04.2 MULTIPLE THYROID NODULES: Status: RESOLVED | Noted: 2022-07-28 | Resolved: 2024-07-24

## 2024-07-24 PROBLEM — R79.89 ELEVATED SERUM CREATININE: Status: RESOLVED | Noted: 2022-06-16 | Resolved: 2024-07-24

## 2024-07-24 LAB
ALBUMIN SERPL BCG-MCNC: 4.4 G/DL (ref 3.5–5)
ALP SERPL-CCNC: 60 U/L (ref 34–104)
ALT SERPL W P-5'-P-CCNC: 14 U/L (ref 7–52)
ANION GAP SERPL CALCULATED.3IONS-SCNC: 8 MMOL/L (ref 4–13)
AST SERPL W P-5'-P-CCNC: 17 U/L (ref 13–39)
BACTERIA UR QL AUTO: ABNORMAL /HPF
BASOPHILS # BLD AUTO: 0.04 THOUSANDS/ÂΜL (ref 0–0.1)
BASOPHILS NFR BLD AUTO: 1 % (ref 0–1)
BILIRUB SERPL-MCNC: 2.12 MG/DL (ref 0.2–1)
BILIRUB UR QL STRIP: NEGATIVE
BUN SERPL-MCNC: 20 MG/DL (ref 5–25)
CALCIUM SERPL-MCNC: 9.7 MG/DL (ref 8.4–10.2)
CHLORIDE SERPL-SCNC: 99 MMOL/L (ref 96–108)
CHOLEST SERPL-MCNC: 249 MG/DL
CLARITY UR: CLEAR
CO2 SERPL-SCNC: 33 MMOL/L (ref 21–32)
COLOR UR: YELLOW
CREAT SERPL-MCNC: 1.27 MG/DL (ref 0.6–1.3)
CREAT UR-MCNC: 199.3 MG/DL
CREAT UR-MCNC: 199.3 MG/DL
EOSINOPHIL # BLD AUTO: 0.13 THOUSAND/ÂΜL (ref 0–0.61)
EOSINOPHIL NFR BLD AUTO: 2 % (ref 0–6)
ERYTHROCYTE [DISTWIDTH] IN BLOOD BY AUTOMATED COUNT: 12.4 % (ref 11.6–15.1)
EST. AVERAGE GLUCOSE BLD GHB EST-MCNC: 123 MG/DL
GFR SERPL CREATININE-BSD FRML MDRD: 57 ML/MIN/1.73SQ M
GLUCOSE P FAST SERPL-MCNC: 93 MG/DL (ref 65–99)
GLUCOSE UR STRIP-MCNC: NEGATIVE MG/DL
HBA1C MFR BLD: 5.9 %
HCT VFR BLD AUTO: 52.1 % (ref 36.5–49.3)
HCV AB SER QL: NORMAL
HDLC SERPL-MCNC: 53 MG/DL
HGB BLD-MCNC: 17.3 G/DL (ref 12–17)
HGB UR QL STRIP.AUTO: NEGATIVE
IMM GRANULOCYTES # BLD AUTO: 0.02 THOUSAND/UL (ref 0–0.2)
IMM GRANULOCYTES NFR BLD AUTO: 0 % (ref 0–2)
KETONES UR STRIP-MCNC: NEGATIVE MG/DL
LDLC SERPL CALC-MCNC: 161 MG/DL (ref 0–100)
LEUKOCYTE ESTERASE UR QL STRIP: NEGATIVE
LYMPHOCYTES # BLD AUTO: 1.42 THOUSANDS/ÂΜL (ref 0.6–4.47)
LYMPHOCYTES NFR BLD AUTO: 20 % (ref 14–44)
MCH RBC QN AUTO: 30.4 PG (ref 26.8–34.3)
MCHC RBC AUTO-ENTMCNC: 33.2 G/DL (ref 31.4–37.4)
MCV RBC AUTO: 91 FL (ref 82–98)
MICROALBUMIN UR-MCNC: 11.2 MG/L
MICROALBUMIN/CREAT 24H UR: 6 MG/G CREATININE (ref 0–30)
MONOCYTES # BLD AUTO: 0.61 THOUSAND/ÂΜL (ref 0.17–1.22)
MONOCYTES NFR BLD AUTO: 9 % (ref 4–12)
MUCOUS THREADS UR QL AUTO: ABNORMAL
NEUTROPHILS # BLD AUTO: 4.89 THOUSANDS/ÂΜL (ref 1.85–7.62)
NEUTS SEG NFR BLD AUTO: 68 % (ref 43–75)
NITRITE UR QL STRIP: NEGATIVE
NON-SQ EPI CELLS URNS QL MICRO: ABNORMAL /HPF
NRBC BLD AUTO-RTO: 0 /100 WBCS
PH UR STRIP.AUTO: 6 [PH]
PLATELET # BLD AUTO: 168 THOUSANDS/UL (ref 149–390)
PMV BLD AUTO: 9.7 FL (ref 8.9–12.7)
POTASSIUM SERPL-SCNC: 4.2 MMOL/L (ref 3.5–5.3)
PROT SERPL-MCNC: 7.3 G/DL (ref 6.4–8.4)
PROT UR STRIP-MCNC: ABNORMAL MG/DL
PROT UR-MCNC: 9 MG/DL
PROT/CREAT UR: 0.05 MG/G{CREAT} (ref 0–0.1)
PSA SERPL-MCNC: 1.81 NG/ML (ref 0–4)
RBC # BLD AUTO: 5.7 MILLION/UL (ref 3.88–5.62)
RBC #/AREA URNS AUTO: ABNORMAL /HPF
SODIUM SERPL-SCNC: 140 MMOL/L (ref 135–147)
SP GR UR STRIP.AUTO: 1.02 (ref 1–1.03)
TRIGL SERPL-MCNC: 173 MG/DL
TSH SERPL DL<=0.05 MIU/L-ACNC: 2.29 UIU/ML (ref 0.45–4.5)
UROBILINOGEN UR STRIP-ACNC: <2 MG/DL
WBC # BLD AUTO: 7.11 THOUSAND/UL (ref 4.31–10.16)
WBC #/AREA URNS AUTO: ABNORMAL /HPF

## 2024-07-24 PROCEDURE — 99214 OFFICE O/P EST MOD 30 MIN: CPT | Performed by: INTERNAL MEDICINE

## 2024-07-24 PROCEDURE — 80061 LIPID PANEL: CPT

## 2024-07-24 PROCEDURE — 82570 ASSAY OF URINE CREATININE: CPT | Performed by: INTERNAL MEDICINE

## 2024-07-24 PROCEDURE — 84156 ASSAY OF PROTEIN URINE: CPT | Performed by: INTERNAL MEDICINE

## 2024-07-24 PROCEDURE — 82043 UR ALBUMIN QUANTITATIVE: CPT | Performed by: INTERNAL MEDICINE

## 2024-07-24 PROCEDURE — G0439 PPPS, SUBSEQ VISIT: HCPCS | Performed by: INTERNAL MEDICINE

## 2024-07-24 PROCEDURE — 36415 COLL VENOUS BLD VENIPUNCTURE: CPT

## 2024-07-24 PROCEDURE — 86803 HEPATITIS C AB TEST: CPT

## 2024-07-24 PROCEDURE — 81001 URINALYSIS AUTO W/SCOPE: CPT

## 2024-07-24 PROCEDURE — 83036 HEMOGLOBIN GLYCOSYLATED A1C: CPT

## 2024-07-24 PROCEDURE — 85025 COMPLETE CBC W/AUTO DIFF WBC: CPT

## 2024-07-24 PROCEDURE — 84443 ASSAY THYROID STIM HORMONE: CPT

## 2024-07-24 PROCEDURE — G0103 PSA SCREENING: HCPCS

## 2024-07-24 RX ORDER — TAMSULOSIN HYDROCHLORIDE 0.4 MG/1
0.4 CAPSULE ORAL
Qty: 90 CAPSULE | Refills: 0 | Status: SHIPPED | OUTPATIENT
Start: 2024-07-24

## 2024-07-24 RX ORDER — EZETIMIBE 10 MG/1
10 TABLET ORAL DAILY
Qty: 90 TABLET | Refills: 3 | Status: SHIPPED | OUTPATIENT
Start: 2024-07-24

## 2024-07-24 RX ORDER — BUDESONIDE AND FORMOTEROL FUMARATE DIHYDRATE 160; 4.5 UG/1; UG/1
2 AEROSOL RESPIRATORY (INHALATION) 2 TIMES DAILY
Qty: 10.2 G | Refills: 5 | Status: SHIPPED | OUTPATIENT
Start: 2024-07-24

## 2024-07-24 NOTE — PATIENT INSTRUCTIONS
Medicare Preventive Visit Patient Instructions  Thank you for completing your Welcome to Medicare Visit or Medicare Annual Wellness Visit today. Your next wellness visit will be due in one year (7/25/2025).  The screening/preventive services that you may require over the next 5-10 years are detailed below. Some tests may not apply to you based off risk factors and/or age. Screening tests ordered at today's visit but not completed yet may show as past due. Also, please note that scanned in results may not display below.  Preventive Screenings:  Service Recommendations Previous Testing/Comments   Colorectal Cancer Screening  Colonoscopy    Fecal Occult Blood Test (FOBT)/Fecal Immunochemical Test (FIT)  Fecal DNA/Cologuard Test  Flexible Sigmoidoscopy Age: 45-75 years old   Colonoscopy: every 10 years (May be performed more frequently if at higher risk)  OR  FOBT/FIT: every 1 year  OR  Cologuard: every 3 years  OR  Sigmoidoscopy: every 5 years  Screening may be recommended earlier than age 45 if at higher risk for colorectal cancer. Also, an individualized decision between you and your healthcare provider will decide whether screening between the ages of 76-85 would be appropriate. Colonoscopy: Not on file  FOBT/FIT: Not on file  Cologuard: Not on file  Sigmoidoscopy: Not on file          Prostate Cancer Screening Individualized decision between patient and health care provider in men between ages of 55-69   Medicare will cover every 12 months beginning on the day after your 50th birthday PSA: No results in last 5 years           Hepatitis C Screening Once for adults born between 1945 and 1965  More frequently in patients at high risk for Hepatitis C Hep C Antibody: Not on file        Diabetes Screening 1-2 times per year if you're at risk for diabetes or have pre-diabetes Fasting glucose: 99 mg/dL (6/2/2023)  A1C: 5.6 % (6/2/2023)  Screening Current   Cholesterol Screening Once every 5 years if you don't have a lipid  disorder. May order more often based on risk factors. Lipid panel: 06/02/2023  Screening Current      Other Preventive Screenings Covered by Medicare:  Abdominal Aortic Aneurysm (AAA) Screening: covered once if your at risk. You're considered to be at risk if you have a family history of AAA or a male between the age of 65-75 who smoking at least 100 cigarettes in your lifetime.  Lung Cancer Screening: covers low dose CT scan once per year if you meet all of the following conditions: (1) Age 55-77; (2) No signs or symptoms of lung cancer; (3) Current smoker or have quit smoking within the last 15 years; (4) You have a tobacco smoking history of at least 20 pack years (packs per day x number of years you smoked); (5) You get a written order from a healthcare provider.  Glaucoma Screening: covered annually if you're considered high risk: (1) You have diabetes OR (2) Family history of glaucoma OR (3)  aged 50 and older OR (4)  American aged 65 and older  Osteoporosis Screening: covered every 2 years if you meet one of the following conditions: (1) Have a vertebral abnormality; (2) On glucocorticoid therapy for more than 3 months; (3) Have primary hyperparathyroidism; (4) On osteoporosis medications and need to assess response to drug therapy.  HIV Screening: covered annually if you're between the age of 15-65. Also covered annually if you are younger than 15 and older than 65 with risk factors for HIV infection. For pregnant patients, it is covered up to 3 times per pregnancy.    Immunizations:  Immunization Recommendations   Influenza Vaccine Annual influenza vaccination during flu season is recommended for all persons aged >= 6 months who do not have contraindications   Pneumococcal Vaccine   * Pneumococcal conjugate vaccine = PCV13 (Prevnar 13), PCV15 (Vaxneuvance), PCV20 (Prevnar 20)  * Pneumococcal polysaccharide vaccine = PPSV23 (Pneumovax) Adults 19-63 yo with certain risk factors or if  65+ yo  If never received any pneumonia vaccine: recommend Prevnar 20 (PCV20)  Give PCV20 if previously received 1 dose of PCV13 or PPSV23   Hepatitis B Vaccine 3 dose series if at intermediate or high risk (ex: diabetes, end stage renal disease, liver disease)   Respiratory syncytial virus (RSV) Vaccine - COVERED BY MEDICARE PART D  * RSVPreF3 (Arexvy) CDC recommends that adults 60 years of age and older may receive a single dose of RSV vaccine using shared clinical decision-making (SCDM)   Tetanus (Td) Vaccine - COST NOT COVERED BY MEDICARE PART B Following completion of primary series, a booster dose should be given every 10 years to maintain immunity against tetanus. Td may also be given as tetanus wound prophylaxis.   Tdap Vaccine - COST NOT COVERED BY MEDICARE PART B Recommended at least once for all adults. For pregnant patients, recommended with each pregnancy.   Shingles Vaccine (Shingrix) - COST NOT COVERED BY MEDICARE PART B  2 shot series recommended in those 19 years and older who have or will have weakened immune systems or those 50 years and older     Health Maintenance Due:      Topic Date Due   • Hepatitis C Screening  Never done   • Colorectal Cancer Screening  Never done     Immunizations Due:      Topic Date Due   • COVID-19 Vaccine (1 - 2023-24 season) Never done   • Influenza Vaccine (1) 09/01/2024     Advance Directives   What are advance directives?  Advance directives are legal documents that state your wishes and plans for medical care. These plans are made ahead of time in case you lose your ability to make decisions for yourself. Advance directives can apply to any medical decision, such as the treatments you want, and if you want to donate organs.   What are the types of advance directives?  There are many types of advance directives, and each state has rules about how to use them. You may choose a combination of any of the following:  Living will:  This is a written record of the  treatment you want. You can also choose which treatments you do not want, which to limit, and which to stop at a certain time. This includes surgery, medicine, IV fluid, and tube feedings.   Durable power of  for healthcare (DPAHC):  This is a written record that states who you want to make healthcare choices for you when you are unable to make them for yourself. This person, called a proxy, is usually a family member or a friend. You may choose more than 1 proxy.  Do not resuscitate (DNR) order:  A DNR order is used in case your heart stops beating or you stop breathing. It is a request not to have certain forms of treatment, such as CPR. A DNR order may be included in other types of advance directives.  Medical directive:  This covers the care that you want if you are in a coma, near death, or unable to make decisions for yourself. You can list the treatments you want for each condition. Treatment may include pain medicine, surgery, blood transfusions, dialysis, IV or tube feedings, and a ventilator (breathing machine).  Values history:  This document has questions about your views, beliefs, and how you feel and think about life. This information can help others choose the care that you would choose.  Why are advance directives important?  An advance directive helps you control your care. Although spoken wishes may be used, it is better to have your wishes written down. Spoken wishes can be misunderstood, or not followed. Treatments may be given even if you do not want them. An advance directive may make it easier for your family to make difficult choices about your care.   Weight Management   Why it is important to manage your weight:  Being overweight increases your risk of health conditions such as heart disease, high blood pressure, type 2 diabetes, and certain types of cancer. It can also increase your risk for osteoarthritis, sleep apnea, and other respiratory problems. Aim for a slow, steady weight  loss. Even a small amount of weight loss can lower your risk of health problems.  How to lose weight safely:  A safe and healthy way to lose weight is to eat fewer calories and get regular exercise. You can lose up about 1 pound a week by decreasing the number of calories you eat by 500 calories each day.   Healthy meal plan for weight management:  A healthy meal plan includes a variety of foods, contains fewer calories, and helps you stay healthy. A healthy meal plan includes the following:  Eat whole-grain foods more often.  A healthy meal plan should contain fiber. Fiber is the part of grains, fruits, and vegetables that is not broken down by your body. Whole-grain foods are healthy and provide extra fiber in your diet. Some examples of whole-grain foods are whole-wheat breads and pastas, oatmeal, brown rice, and bulgur.  Eat a variety of vegetables every day.  Include dark, leafy greens such as spinach, kale, barb greens, and mustard greens. Eat yellow and orange vegetables such as carrots, sweet potatoes, and winter squash.   Eat a variety of fruits every day.  Choose fresh or canned fruit (canned in its own juice or light syrup) instead of juice. Fruit juice has very little or no fiber.  Eat low-fat dairy foods.  Drink fat-free (skim) milk or 1% milk. Eat fat-free yogurt and low-fat cottage cheese. Try low-fat cheeses such as mozzarella and other reduced-fat cheeses.  Choose meat and other protein foods that are low in fat.  Choose beans or other legumes such as split peas or lentils. Choose fish, skinless poultry (chicken or turkey), or lean cuts of red meat (beef or pork). Before you cook meat or poultry, cut off any visible fat.   Use less fat and oil.  Try baking foods instead of frying them. Add less fat, such as margarine, sour cream, regular salad dressing and mayonnaise to foods. Eat fewer high-fat foods. Some examples of high-fat foods include french fries, doughnuts, ice cream, and cakes.  Eat  fewer sweets.  Limit foods and drinks that are high in sugar. This includes candy, cookies, regular soda, and sweetened drinks.  Exercise:  Exercise at least 30 minutes per day on most days of the week. Some examples of exercise include walking, biking, dancing, and swimming. You can also fit in more physical activity by taking the stairs instead of the elevator or parking farther away from stores. Ask your healthcare provider about the best exercise plan for you.      © Copyright [employer] 2018 Information is for End User's use only and may not be sold, redistributed or otherwise used for commercial purposes. All illustrations and images included in CareNotes® are the copyrighted property of [employer] or [employer]    Medicare Preventive Visit Patient Instructions  Thank you for completing your Welcome to Medicare Visit or Medicare Annual Wellness Visit today. Your next wellness visit will be due in one year (7/25/2025).  The screening/preventive services that you may require over the next 5-10 years are detailed below. Some tests may not apply to you based off risk factors and/or age. Screening tests ordered at today's visit but not completed yet may show as past due. Also, please note that scanned in results may not display below.  Preventive Screenings:  Service Recommendations Previous Testing/Comments   Colorectal Cancer Screening  Colonoscopy    Fecal Occult Blood Test (FOBT)/Fecal Immunochemical Test (FIT)  Fecal DNA/Cologuard Test  Flexible Sigmoidoscopy Age: 45-75 years old   Colonoscopy: every 10 years (May be performed more frequently if at higher risk)  OR  FOBT/FIT: every 1 year  OR  Cologuard: every 3 years  OR  Sigmoidoscopy: every 5 years  Screening may be recommended earlier than age 45 if at higher risk for colorectal cancer. Also, an individualized decision between you and your healthcare provider will decide whether screening between the ages of 76-85 would be appropriate.  Colonoscopy: Not on file  FOBT/FIT: Not on file  Cologuard: Not on file  Sigmoidoscopy: Not on file          Prostate Cancer Screening Individualized decision between patient and health care provider in men between ages of 55-69   Medicare will cover every 12 months beginning on the day after your 50th birthday PSA: No results in last 5 years           Hepatitis C Screening Once for adults born between 1945 and 1965  More frequently in patients at high risk for Hepatitis C Hep C Antibody: Not on file        Diabetes Screening 1-2 times per year if you're at risk for diabetes or have pre-diabetes Fasting glucose: 99 mg/dL (6/2/2023)  A1C: 5.6 % (6/2/2023)  Screening Current   Cholesterol Screening Once every 5 years if you don't have a lipid disorder. May order more often based on risk factors. Lipid panel: 06/02/2023  Screening Current      Other Preventive Screenings Covered by Medicare:  Abdominal Aortic Aneurysm (AAA) Screening: covered once if your at risk. You're considered to be at risk if you have a family history of AAA or a male between the age of 65-75 who smoking at least 100 cigarettes in your lifetime.  Lung Cancer Screening: covers low dose CT scan once per year if you meet all of the following conditions: (1) Age 55-77; (2) No signs or symptoms of lung cancer; (3) Current smoker or have quit smoking within the last 15 years; (4) You have a tobacco smoking history of at least 20 pack years (packs per day x number of years you smoked); (5) You get a written order from a healthcare provider.  Glaucoma Screening: covered annually if you're considered high risk: (1) You have diabetes OR (2) Family history of glaucoma OR (3)  aged 50 and older OR (4)  American aged 65 and older  Osteoporosis Screening: covered every 2 years if you meet one of the following conditions: (1) Have a vertebral abnormality; (2) On glucocorticoid therapy for more than 3 months; (3) Have primary  hyperparathyroidism; (4) On osteoporosis medications and need to assess response to drug therapy.  HIV Screening: covered annually if you're between the age of 15-65. Also covered annually if you are younger than 15 and older than 65 with risk factors for HIV infection. For pregnant patients, it is covered up to 3 times per pregnancy.    Immunizations:  Immunization Recommendations   Influenza Vaccine Annual influenza vaccination during flu season is recommended for all persons aged >= 6 months who do not have contraindications   Pneumococcal Vaccine   * Pneumococcal conjugate vaccine = PCV13 (Prevnar 13), PCV15 (Vaxneuvance), PCV20 (Prevnar 20)  * Pneumococcal polysaccharide vaccine = PPSV23 (Pneumovax) Adults 19-63 yo with certain risk factors or if 65+ yo  If never received any pneumonia vaccine: recommend Prevnar 20 (PCV20)  Give PCV20 if previously received 1 dose of PCV13 or PPSV23   Hepatitis B Vaccine 3 dose series if at intermediate or high risk (ex: diabetes, end stage renal disease, liver disease)   Respiratory syncytial virus (RSV) Vaccine - COVERED BY MEDICARE PART D  * RSVPreF3 (Arexvy) CDC recommends that adults 60 years of age and older may receive a single dose of RSV vaccine using shared clinical decision-making (SCDM)   Tetanus (Td) Vaccine - COST NOT COVERED BY MEDICARE PART B Following completion of primary series, a booster dose should be given every 10 years to maintain immunity against tetanus. Td may also be given as tetanus wound prophylaxis.   Tdap Vaccine - COST NOT COVERED BY MEDICARE PART B Recommended at least once for all adults. For pregnant patients, recommended with each pregnancy.   Shingles Vaccine (Shingrix) - COST NOT COVERED BY MEDICARE PART B  2 shot series recommended in those 19 years and older who have or will have weakened immune systems or those 50 years and older     Health Maintenance Due:      Topic Date Due   • Hepatitis C Screening  Never done   • Colorectal Cancer  Screening  Never done     Immunizations Due:      Topic Date Due   • COVID-19 Vaccine (1 - 2023-24 season) Never done   • Influenza Vaccine (1) 09/01/2024     Advance Directives   What are advance directives?  Advance directives are legal documents that state your wishes and plans for medical care. These plans are made ahead of time in case you lose your ability to make decisions for yourself. Advance directives can apply to any medical decision, such as the treatments you want, and if you want to donate organs.   What are the types of advance directives?  There are many types of advance directives, and each state has rules about how to use them. You may choose a combination of any of the following:  Living will:  This is a written record of the treatment you want. You can also choose which treatments you do not want, which to limit, and which to stop at a certain time. This includes surgery, medicine, IV fluid, and tube feedings.   Durable power of  for healthcare (DPAHC):  This is a written record that states who you want to make healthcare choices for you when you are unable to make them for yourself. This person, called a proxy, is usually a family member or a friend. You may choose more than 1 proxy.  Do not resuscitate (DNR) order:  A DNR order is used in case your heart stops beating or you stop breathing. It is a request not to have certain forms of treatment, such as CPR. A DNR order may be included in other types of advance directives.  Medical directive:  This covers the care that you want if you are in a coma, near death, or unable to make decisions for yourself. You can list the treatments you want for each condition. Treatment may include pain medicine, surgery, blood transfusions, dialysis, IV or tube feedings, and a ventilator (breathing machine).  Values history:  This document has questions about your views, beliefs, and how you feel and think about life. This information can help others  choose the care that you would choose.  Why are advance directives important?  An advance directive helps you control your care. Although spoken wishes may be used, it is better to have your wishes written down. Spoken wishes can be misunderstood, or not followed. Treatments may be given even if you do not want them. An advance directive may make it easier for your family to make difficult choices about your care.   Weight Management   Why it is important to manage your weight:  Being overweight increases your risk of health conditions such as heart disease, high blood pressure, type 2 diabetes, and certain types of cancer. It can also increase your risk for osteoarthritis, sleep apnea, and other respiratory problems. Aim for a slow, steady weight loss. Even a small amount of weight loss can lower your risk of health problems.  How to lose weight safely:  A safe and healthy way to lose weight is to eat fewer calories and get regular exercise. You can lose up about 1 pound a week by decreasing the number of calories you eat by 500 calories each day.   Healthy meal plan for weight management:  A healthy meal plan includes a variety of foods, contains fewer calories, and helps you stay healthy. A healthy meal plan includes the following:  Eat whole-grain foods more often.  A healthy meal plan should contain fiber. Fiber is the part of grains, fruits, and vegetables that is not broken down by your body. Whole-grain foods are healthy and provide extra fiber in your diet. Some examples of whole-grain foods are whole-wheat breads and pastas, oatmeal, brown rice, and bulgur.  Eat a variety of vegetables every day.  Include dark, leafy greens such as spinach, kale, barb greens, and mustard greens. Eat yellow and orange vegetables such as carrots, sweet potatoes, and winter squash.   Eat a variety of fruits every day.  Choose fresh or canned fruit (canned in its own juice or light syrup) instead of juice. Fruit juice has  very little or no fiber.  Eat low-fat dairy foods.  Drink fat-free (skim) milk or 1% milk. Eat fat-free yogurt and low-fat cottage cheese. Try low-fat cheeses such as mozzarella and other reduced-fat cheeses.  Choose meat and other protein foods that are low in fat.  Choose beans or other legumes such as split peas or lentils. Choose fish, skinless poultry (chicken or turkey), or lean cuts of red meat (beef or pork). Before you cook meat or poultry, cut off any visible fat.   Use less fat and oil.  Try baking foods instead of frying them. Add less fat, such as margarine, sour cream, regular salad dressing and mayonnaise to foods. Eat fewer high-fat foods. Some examples of high-fat foods include french fries, doughnuts, ice cream, and cakes.  Eat fewer sweets.  Limit foods and drinks that are high in sugar. This includes candy, cookies, regular soda, and sweetened drinks.  Exercise:  Exercise at least 30 minutes per day on most days of the week. Some examples of exercise include walking, biking, dancing, and swimming. You can also fit in more physical activity by taking the stairs instead of the elevator or parking farther away from stores. Ask your healthcare provider about the best exercise plan for you.      © Copyright [employer] 2018 Information is for End User's use only and may not be sold, redistributed or otherwise used for commercial purposes. All illustrations and images included in CareNotes® are the copyrighted property of [employer] or [employer]    Medicare Preventive Visit Patient Instructions  Thank you for completing your Welcome to Medicare Visit or Medicare Annual Wellness Visit today. Your next wellness visit will be due in one year (7/25/2025).  The screening/preventive services that you may require over the next 5-10 years are detailed below. Some tests may not apply to you based off risk factors and/or age. Screening tests ordered at today's visit but not completed yet may show  as past due. Also, please note that scanned in results may not display below.  Preventive Screenings:  Service Recommendations Previous Testing/Comments   Colorectal Cancer Screening  Colonoscopy    Fecal Occult Blood Test (FOBT)/Fecal Immunochemical Test (FIT)  Fecal DNA/Cologuard Test  Flexible Sigmoidoscopy Age: 45-75 years old   Colonoscopy: every 10 years (May be performed more frequently if at higher risk)  OR  FOBT/FIT: every 1 year  OR  Cologuard: every 3 years  OR  Sigmoidoscopy: every 5 years  Screening may be recommended earlier than age 45 if at higher risk for colorectal cancer. Also, an individualized decision between you and your healthcare provider will decide whether screening between the ages of 76-85 would be appropriate. Colonoscopy: Not on file  FOBT/FIT: Not on file  Cologuard: Not on file  Sigmoidoscopy: Not on file          Prostate Cancer Screening Individualized decision between patient and health care provider in men between ages of 55-69   Medicare will cover every 12 months beginning on the day after your 50th birthday PSA: No results in last 5 years           Hepatitis C Screening Once for adults born between 1945 and 1965  More frequently in patients at high risk for Hepatitis C Hep C Antibody: Not on file        Diabetes Screening 1-2 times per year if you're at risk for diabetes or have pre-diabetes Fasting glucose: 99 mg/dL (6/2/2023)  A1C: 5.6 % (6/2/2023)  Screening Current   Cholesterol Screening Once every 5 years if you don't have a lipid disorder. May order more often based on risk factors. Lipid panel: 06/02/2023  Screening Current      Other Preventive Screenings Covered by Medicare:  Abdominal Aortic Aneurysm (AAA) Screening: covered once if your at risk. You're considered to be at risk if you have a family history of AAA or a male between the age of 65-75 who smoking at least 100 cigarettes in your lifetime.  Lung Cancer Screening: covers low dose CT scan once per year if  you meet all of the following conditions: (1) Age 55-77; (2) No signs or symptoms of lung cancer; (3) Current smoker or have quit smoking within the last 15 years; (4) You have a tobacco smoking history of at least 20 pack years (packs per day x number of years you smoked); (5) You get a written order from a healthcare provider.  Glaucoma Screening: covered annually if you're considered high risk: (1) You have diabetes OR (2) Family history of glaucoma OR (3)  aged 50 and older OR (4)  American aged 65 and older  Osteoporosis Screening: covered every 2 years if you meet one of the following conditions: (1) Have a vertebral abnormality; (2) On glucocorticoid therapy for more than 3 months; (3) Have primary hyperparathyroidism; (4) On osteoporosis medications and need to assess response to drug therapy.  HIV Screening: covered annually if you're between the age of 15-65. Also covered annually if you are younger than 15 and older than 65 with risk factors for HIV infection. For pregnant patients, it is covered up to 3 times per pregnancy.    Immunizations:  Immunization Recommendations   Influenza Vaccine Annual influenza vaccination during flu season is recommended for all persons aged >= 6 months who do not have contraindications   Pneumococcal Vaccine   * Pneumococcal conjugate vaccine = PCV13 (Prevnar 13), PCV15 (Vaxneuvance), PCV20 (Prevnar 20)  * Pneumococcal polysaccharide vaccine = PPSV23 (Pneumovax) Adults 19-63 yo with certain risk factors or if 65+ yo  If never received any pneumonia vaccine: recommend Prevnar 20 (PCV20)  Give PCV20 if previously received 1 dose of PCV13 or PPSV23   Hepatitis B Vaccine 3 dose series if at intermediate or high risk (ex: diabetes, end stage renal disease, liver disease)   Respiratory syncytial virus (RSV) Vaccine - COVERED BY MEDICARE PART D  * RSVPreF3 (Arexvy) CDC recommends that adults 60 years of age and older may receive a single dose of RSV  vaccine using shared clinical decision-making (SCDM)   Tetanus (Td) Vaccine - COST NOT COVERED BY MEDICARE PART B Following completion of primary series, a booster dose should be given every 10 years to maintain immunity against tetanus. Td may also be given as tetanus wound prophylaxis.   Tdap Vaccine - COST NOT COVERED BY MEDICARE PART B Recommended at least once for all adults. For pregnant patients, recommended with each pregnancy.   Shingles Vaccine (Shingrix) - COST NOT COVERED BY MEDICARE PART B  2 shot series recommended in those 19 years and older who have or will have weakened immune systems or those 50 years and older     Health Maintenance Due:      Topic Date Due   • Hepatitis C Screening  Never done   • Colorectal Cancer Screening  Never done     Immunizations Due:      Topic Date Due   • COVID-19 Vaccine (1 - 2023-24 season) Never done   • Influenza Vaccine (1) 09/01/2024     Advance Directives   What are advance directives?  Advance directives are legal documents that state your wishes and plans for medical care. These plans are made ahead of time in case you lose your ability to make decisions for yourself. Advance directives can apply to any medical decision, such as the treatments you want, and if you want to donate organs.   What are the types of advance directives?  There are many types of advance directives, and each state has rules about how to use them. You may choose a combination of any of the following:  Living will:  This is a written record of the treatment you want. You can also choose which treatments you do not want, which to limit, and which to stop at a certain time. This includes surgery, medicine, IV fluid, and tube feedings.   Durable power of  for healthcare (DPAHC):  This is a written record that states who you want to make healthcare choices for you when you are unable to make them for yourself. This person, called a proxy, is usually a family member or a friend. You  may choose more than 1 proxy.  Do not resuscitate (DNR) order:  A DNR order is used in case your heart stops beating or you stop breathing. It is a request not to have certain forms of treatment, such as CPR. A DNR order may be included in other types of advance directives.  Medical directive:  This covers the care that you want if you are in a coma, near death, or unable to make decisions for yourself. You can list the treatments you want for each condition. Treatment may include pain medicine, surgery, blood transfusions, dialysis, IV or tube feedings, and a ventilator (breathing machine).  Values history:  This document has questions about your views, beliefs, and how you feel and think about life. This information can help others choose the care that you would choose.  Why are advance directives important?  An advance directive helps you control your care. Although spoken wishes may be used, it is better to have your wishes written down. Spoken wishes can be misunderstood, or not followed. Treatments may be given even if you do not want them. An advance directive may make it easier for your family to make difficult choices about your care.   Weight Management   Why it is important to manage your weight:  Being overweight increases your risk of health conditions such as heart disease, high blood pressure, type 2 diabetes, and certain types of cancer. It can also increase your risk for osteoarthritis, sleep apnea, and other respiratory problems. Aim for a slow, steady weight loss. Even a small amount of weight loss can lower your risk of health problems.  How to lose weight safely:  A safe and healthy way to lose weight is to eat fewer calories and get regular exercise. You can lose up about 1 pound a week by decreasing the number of calories you eat by 500 calories each day.   Healthy meal plan for weight management:  A healthy meal plan includes a variety of foods, contains fewer calories, and helps you stay  healthy. A healthy meal plan includes the following:  Eat whole-grain foods more often.  A healthy meal plan should contain fiber. Fiber is the part of grains, fruits, and vegetables that is not broken down by your body. Whole-grain foods are healthy and provide extra fiber in your diet. Some examples of whole-grain foods are whole-wheat breads and pastas, oatmeal, brown rice, and bulgur.  Eat a variety of vegetables every day.  Include dark, leafy greens such as spinach, kale, barb greens, and mustard greens. Eat yellow and orange vegetables such as carrots, sweet potatoes, and winter squash.   Eat a variety of fruits every day.  Choose fresh or canned fruit (canned in its own juice or light syrup) instead of juice. Fruit juice has very little or no fiber.  Eat low-fat dairy foods.  Drink fat-free (skim) milk or 1% milk. Eat fat-free yogurt and low-fat cottage cheese. Try low-fat cheeses such as mozzarella and other reduced-fat cheeses.  Choose meat and other protein foods that are low in fat.  Choose beans or other legumes such as split peas or lentils. Choose fish, skinless poultry (chicken or turkey), or lean cuts of red meat (beef or pork). Before you cook meat or poultry, cut off any visible fat.   Use less fat and oil.  Try baking foods instead of frying them. Add less fat, such as margarine, sour cream, regular salad dressing and mayonnaise to foods. Eat fewer high-fat foods. Some examples of high-fat foods include french fries, doughnuts, ice cream, and cakes.  Eat fewer sweets.  Limit foods and drinks that are high in sugar. This includes candy, cookies, regular soda, and sweetened drinks.  Exercise:  Exercise at least 30 minutes per day on most days of the week. Some examples of exercise include walking, biking, dancing, and swimming. You can also fit in more physical activity by taking the stairs instead of the elevator or parking farther away from stores. Ask your healthcare provider about the best  exercise plan for you.      © Copyright [employer] 2018 Information is for End User's use only and may not be sold, redistributed or otherwise used for commercial purposes. All illustrations and images included in CareNotes® are the copyrighted property of A.D.A.M., Inc. or [employer]

## 2024-07-24 NOTE — PROGRESS NOTES
Ambulatory Visit  Name: Myles Vaughan      : 1955      MRN: [id number]  Encounter Provider: Irish Reilly MD  Encounter Date: 2024   Encounter department: [facility] PRIMARY CARE    Assessment & Plan   1. Encounter for annual wellness visit (AWV) in Medicare patient  2. Screening for colon cancer  -     Cologuard  3. Paroxysmal atrial fibrillation (HCC)  4. Benign prostatic hyperplasia with nocturia  -     tamsulosin (FLOMAX) 0.4 mg; Take 1 capsule (0.4 mg total) by mouth daily with dinner  5. Screening for prostate cancer  -     PSA, Total Screen; Future  6. Mild persistent asthma without complication  -     budesonide-formoterol (Symbicort) 160-4.5 mcg/act inhaler; Inhale 2 puffs 2 (two) times a day Rinse mouth after use.  -     Comprehensive metabolic panel; Future  -     CBC and differential; Future  7. Benign essential hypertension  -     Comprehensive metabolic panel; Future  -     CBC and differential; Future  -     Urinalysis with microscopic; Future  -     Protein / creatinine ratio, urine; Future  -     Albumin / creatinine urine ratio; Future  -     Protein / creatinine ratio, urine  -     Albumin / creatinine urine ratio  8. Prediabetes  -     Comprehensive metabolic panel; Future  -     CBC and differential; Future  -     Hemoglobin A1C; Future  9. Dyslipidemia  -     Lipid Panel with Direct LDL reflex; Future  -     ezetimibe (ZETIA) 10 mg tablet; Take 1 tablet (10 mg total) by mouth daily Take one tablet by mouth once daily at night.  10. Thyroid nodule  -     US thyroid; Future; Expected date: 2024  -     TSH, 3rd generation with Free T4 reflex; Future  11. S/P CABG (coronary artery bypass graft)  -     Comprehensive metabolic panel; Future  -     CBC and differential; Future  12. Chronic pain of right knee  -     XR knee 3 vw right non injury; Future; Expected date: 2024  13. Need for hepatitis C screening test  -     Hepatitis C antibody;  Future      Depression Screening and Follow-up Plan: Patient was screened for depression during today's encounter. They screened negative with a PHQ-2 score of 0.      Preventive health issues were discussed with patient, and age appropriate screening tests were ordered as noted in patient's After Visit Summary. Personalized health advice and appropriate referrals for health education or preventive services given if needed, as noted in patient's After Visit Summary.      History of Present Illness     68yo male with history of CAD s/p CABG, HTN, carotid stenosis with history of stroke here to establish care.     History of 2-vessel CABG in 2020 at [facility], not taking statin, Zetia or aspirin. Due for labs to monitor lipids, renal function and blood sugar. Denies any chest pain, dyspnea or SOB.     Asthma: since childhood, well-controlled with daily Symbicort    HTN: not taking losartan-HCTZ, only Coreg and didn't take anything this morning.     CVA in 2022 presented with right-sided weakness. S/p left CEA. Follows with vascular.     Concerned about nocturia, up to 4 times per night. Denies burning, hematuria, leakage or weak stream. No increased thirst or blurry vision. Does not drink fluids before bed. No family history of prostate cancer that he knows of.        Patient Care Team:  Irish Reilly MD as PCP - General (Internal Medicine)  Lopez Hitchcock DO (Endocrinology)  Yesica Solomon PA-C as Physician Assistant (Vascular Surgery)    Review of Systems   Constitutional:  Negative for appetite change, chills, fatigue and fever.   Respiratory:  Negative for chest tightness and shortness of breath.    Cardiovascular:  Negative for chest pain, palpitations and leg swelling.   Gastrointestinal:  Negative for abdominal pain, blood in stool, constipation, diarrhea, nausea and vomiting.   Endocrine: Negative for polydipsia.   Genitourinary:  Positive for frequency and urgency. Negative for difficulty  urinating, dysuria and hematuria.   Musculoskeletal:  Positive for arthralgias (right knee). Negative for joint swelling.   Neurological:  Negative for dizziness, light-headedness and headaches.     Medical History Reviewed by provider this encounter:  Meds       Annual Wellness Visit Questionnaire   Myles is here for his Subsequent Wellness visit.     Health Risk Assessment:   Patient rates overall health as good. Patient feels that their physical health rating is slightly better. Patient is satisfied with their life. Eyesight was rated as same. Hearing was rated as same. Patient feels that their emotional and mental health rating is same. Patients states they are never, rarely angry. Patient states they are sometimes unusually tired/fatigued. Pain experienced in the last 7 days has been some. Patient's pain rating has been 4/10. Patient states that he has experienced no weight loss or gain in last 6 months.     Depression Screening:   PHQ-2 Score: 0      Fall Risk Screening:   In the past year, patient has experienced: no history of falling in past year      Home Safety:  Patient has trouble with stairs inside or outside of their home. Patient has working smoke alarms and has working carbon monoxide detector. Home safety hazards include: none.     Nutrition:   Current diet is Low Carb and Regular. Follows high-protein, carnivore diet     Medications:   Patient is not currently taking any over-the-counter supplements. Patient is able to manage medications.     Activities of Daily Living (ADLs)/Instrumental Activities of Daily Living (IADLs):   Walk and transfer into and out of bed and chair?: Yes  Dress and groom yourself?: Yes    Bathe or shower yourself?: Yes    Feed yourself? Yes  Do your laundry/housekeeping?: Yes  Manage your money, pay your bills and track your expenses?: Yes  Make your own meals?: Yes    Do your own shopping?: Yes    Previous Hospitalizations:   Any hospitalizations or ED visits within  the last 12 months?: Yes    How many hospitalizations have you had in the last year?: 1-2    Hospitalization Comments: ER visits June 2023     Advance Care Planning:   Living will: Yes    Durable POA for healthcare: Yes    Advanced directive: Yes      Cognitive Screening:   Provider or family/friend/caregiver concerned regarding cognition?: No    PREVENTIVE SCREENINGS      Cardiovascular Screening:    General: Screening Current      Diabetes Screening:     General: Screening Current      Colorectal Cancer Screening:     General: Risks and Benefits Discussed    Due for: Cologuard      Prostate Cancer Screening:    General: Risks and Benefits Discussed    Due for: PSA      Osteoporosis Screening:    General: Screening Not Indicated      Abdominal Aortic Aneurysm (AAA) Screening:    Risk factors include: age between 65-76 yo        Lung Cancer Screening:     General: Screening Not Indicated    Screening, Brief Intervention, and Referral to Treatment (SBIRT)    Screening  Typical number of drinks in a day: 0  Typical number of drinks in a week: 0  Interpretation: Low risk drinking behavior.    Single Item Drug Screening:  How often have you used an illegal drug (including marijuana) or a prescription medication for non-medical reasons in the past year? never    Single Item Drug Screen Score: 0  Interpretation: Negative screen for possible drug use disorder    Brief Intervention  Alcohol & drug use screenings were reviewed. No concerns regarding substance use disorder identified.     Other Counseling Topics:   Car/seat belt/driving safety, skin self-exam, sunscreen and calcium and vitamin D intake and regular weightbearing exercise. Saw dermatologist in the past for mole on left cheek, had biopsy done possibly BCC or melanoma? Hasn't followed up since       Social Determinants of Health     Food Insecurity: No Food Insecurity (6/16/2022)    Hunger Vital Sign    • Worried About Running Out of Food in the Last Year: Never  "true    • Ran Out of Food in the Last Year: Never true   Transportation Needs: No Transportation Needs (6/16/2022)    PRAPARE - Transportation    • Lack of Transportation (Medical): No    • Lack of Transportation (Non-Medical): No   Housing Stability: Low Risk  (6/16/2022)    Housing Stability Vital Sign    • Unable to Pay for Housing in the Last Year: No    • Number of Places Lived in the Last Year: 1    • Unstable Housing in the Last Year: No     No results found.    Objective     /86   Pulse 61   Temp 98 °F (36.7 °C) (Temporal)   Resp 16   Ht 6' 4\" (1.93 m)   Wt 102 kg (224 lb 12.8 oz)   SpO2 98%   BMI 27.36 kg/m²     Physical Exam  Vitals reviewed.   Constitutional:       General: He is not in acute distress.     Appearance: He is normal weight.   Neck:      Vascular: No carotid bruit.   Cardiovascular:      Rate and Rhythm: Normal rate and regular rhythm.      Heart sounds: No murmur heard.     No friction rub. No gallop.   Pulmonary:      Effort: Pulmonary effort is normal. No respiratory distress.      Breath sounds: No wheezing, rhonchi or rales.   Abdominal:      General: Abdomen is flat. Bowel sounds are normal. There is no distension.      Palpations: Abdomen is soft. There is no mass.      Tenderness: There is no abdominal tenderness. There is no guarding or rebound.   Musculoskeletal:         General: No swelling, tenderness or deformity.      Right lower leg: No edema.      Left lower leg: No edema.   Skin:     General: Skin is warm and dry.   Neurological:      General: No focal deficit present.      Mental Status: He is alert.   Psychiatric:         Mood and Affect: Mood normal.         Behavior: Behavior normal.         Thought Content: Thought content normal.         Judgment: Judgment normal.           "

## 2024-07-25 ENCOUNTER — HOSPITAL ENCOUNTER (OUTPATIENT)
Dept: ULTRASOUND IMAGING | Facility: HOSPITAL | Age: 69
Discharge: HOME | End: 2024-07-25
Attending: INTERNAL MEDICINE
Payer: COMMERCIAL

## 2024-07-25 ENCOUNTER — HOSPITAL ENCOUNTER (OUTPATIENT)
Dept: RADIOLOGY | Facility: HOSPITAL | Age: 69
Discharge: HOME | End: 2024-07-25
Payer: COMMERCIAL

## 2024-07-25 DIAGNOSIS — M25.561 CHRONIC PAIN OF RIGHT KNEE: ICD-10-CM

## 2024-07-25 DIAGNOSIS — G89.29 CHRONIC PAIN OF RIGHT KNEE: ICD-10-CM

## 2024-07-25 DIAGNOSIS — E04.1 THYROID NODULE: ICD-10-CM

## 2024-07-25 PROCEDURE — 73562 X-RAY EXAM OF KNEE 3: CPT

## 2024-07-25 PROCEDURE — 76536 US EXAM OF HEAD AND NECK: CPT

## 2024-08-05 ENCOUNTER — TELEPHONE (OUTPATIENT)
Dept: FAMILY MEDICINE CLINIC | Facility: CLINIC | Age: 69
End: 2024-08-05

## 2024-08-05 DIAGNOSIS — I25.10 CORONARY ARTERY DISEASE INVOLVING NATIVE CORONARY ARTERY OF NATIVE HEART WITHOUT ANGINA PECTORIS: Chronic | ICD-10-CM

## 2024-08-05 DIAGNOSIS — N18.2 CKD (CHRONIC KIDNEY DISEASE) STAGE 2, GFR 60-89 ML/MIN: ICD-10-CM

## 2024-08-05 DIAGNOSIS — R73.03 PREDIABETES: ICD-10-CM

## 2024-08-05 DIAGNOSIS — E78.5 DYSLIPIDEMIA: Primary | ICD-10-CM

## 2024-08-05 NOTE — TELEPHONE ENCOUNTER
----- Message from Irish Reilly MD sent at 8/5/2024  8:37 AM EDT -----  I would like it done before his upcoming appointment so we can review at his visit.

## 2024-08-22 ENCOUNTER — TELEPHONE (OUTPATIENT)
Age: 69
End: 2024-08-22

## 2024-08-22 NOTE — TELEPHONE ENCOUNTER
Patient wants to know if Irish Hieusahley can put in an order for a CT coronary calcium score   (Same thing that was sent for his wife)      Myles: [phone]

## 2024-08-23 NOTE — TELEPHONE ENCOUNTER
Please advise him that coronary calcium score is used to assess presence of CAD in patients with risk factors. Since he already has CAD, I don't know if it would be helpful. But I will defer to his primary cardiologist regarding this. DANA Jaffe.

## 2024-09-06 ENCOUNTER — HOSPITAL ENCOUNTER (OUTPATIENT)
Dept: ULTRASOUND IMAGING | Facility: HOSPITAL | Age: 69
Discharge: HOME | End: 2024-09-06
Attending: INTERNAL MEDICINE
Payer: COMMERCIAL

## 2024-09-06 DIAGNOSIS — N18.2 CKD (CHRONIC KIDNEY DISEASE) STAGE 2, GFR 60-89 ML/MIN: ICD-10-CM

## 2024-09-06 PROCEDURE — 76775 US EXAM ABDO BACK WALL LIM: CPT

## 2024-09-13 NOTE — ASSESSMENT & PLAN NOTE
Cath 2019: stent to OM2 and LCx  Cath 4/2020 stent to RCA and OM1  Robotic CABG 4/2020: LIMA-LAD-Diag    Now that he is a year post stenting he will discontinue Brilinta     Recent KOBY non-ischemic  OK to continue cardiac rehab   Continues to have fatigue and a sensation of "fogginess"    See additional comments Occupational Therapy    Patient not seen in therapy.     Unavailable due to medical tests/procedures.      Chart reviewed. Attempted to see patient for occupational therapy treatment. The patient receiving HD. Will re-attempt as schedule allows.       OBJECTIVE                       Documented in the chart in the following areas: Assessment/Plan.      Therapy procedure time and total treatment time can be found documented on the Time Entry flowsheet

## 2024-09-27 ENCOUNTER — TELEPHONE (OUTPATIENT)
Dept: CARDIOLOGY CLINIC | Facility: CLINIC | Age: 69
End: 2024-09-27

## 2024-09-27 NOTE — TELEPHONE ENCOUNTER
Caller: Myles    Doctor/Office: CT score testing    Call regarding :  Ct Score testing     Call was transferred to: central scheduling.

## 2024-12-02 DIAGNOSIS — I65.29 STENOSIS OF CAROTID ARTERY, UNSPECIFIED LATERALITY: ICD-10-CM

## 2024-12-02 DIAGNOSIS — I63.9 STROKE (HCC): ICD-10-CM

## 2024-12-03 RX ORDER — ATORVASTATIN CALCIUM 80 MG/1
TABLET, FILM COATED ORAL
Qty: 90 TABLET | Refills: 2 | Status: SHIPPED | OUTPATIENT
Start: 2024-12-03

## 2024-12-12 ENCOUNTER — TELEPHONE (OUTPATIENT)
Age: 69
End: 2024-12-12

## 2024-12-12 NOTE — TELEPHONE ENCOUNTER
Patient states he is currently being seen in a [facility]. They are recommending he start Testosterone. They advised him to give a call to his PCP for her opinion. If she is agreeable they request a call to the Clinic Director Umer Taylor at [phone].

## 2024-12-16 NOTE — TELEPHONE ENCOUNTER
I would advise he discuss with his cardiologist about this. If they feel he's okay to receive based on CAD history, then I'm okay with it.

## 2024-12-16 NOTE — TELEPHONE ENCOUNTER
Patient made aware and verbalized understanding. He reports his other labs were ok, and that there was so sign on CKD.    Will reach out to his cardiologist.     He is going to have those results forwarded to us by fax.

## 2025-01-06 DIAGNOSIS — I10 ESSENTIAL HYPERTENSION: ICD-10-CM

## 2025-01-09 RX ORDER — CARVEDILOL 12.5 MG/1
TABLET ORAL
Qty: 180 TABLET | Refills: 3 | OUTPATIENT
Start: 2025-01-09

## 2025-02-03 ENCOUNTER — TELEPHONE (OUTPATIENT)
Dept: FAMILY MEDICINE CLINIC | Facility: CLINIC | Age: 70
End: 2025-02-03

## 2025-02-07 ENCOUNTER — HOSPITAL ENCOUNTER (EMERGENCY)
Facility: HOSPITAL | Age: 70
Discharge: HOME | End: 2025-02-07
Attending: EMERGENCY MEDICINE
Payer: COMMERCIAL

## 2025-02-07 ENCOUNTER — APPOINTMENT (EMERGENCY)
Dept: RADIOLOGY | Facility: HOSPITAL | Age: 70
End: 2025-02-07
Attending: EMERGENCY MEDICINE
Payer: COMMERCIAL

## 2025-02-07 VITALS
OXYGEN SATURATION: 99 % | HEART RATE: 54 BPM | RESPIRATION RATE: 18 BRPM | SYSTOLIC BLOOD PRESSURE: 187 MMHG | DIASTOLIC BLOOD PRESSURE: 84 MMHG | TEMPERATURE: 97.6 F

## 2025-02-07 DIAGNOSIS — I25.10 CORONARY ARTERY DISEASE INVOLVING NATIVE CORONARY ARTERY OF NATIVE HEART WITHOUT ANGINA PECTORIS: ICD-10-CM

## 2025-02-07 DIAGNOSIS — R07.9 CHEST PAIN: Primary | ICD-10-CM

## 2025-02-07 DIAGNOSIS — R06.09 DOE (DYSPNEA ON EXERTION): ICD-10-CM

## 2025-02-07 DIAGNOSIS — J45.909 UNCOMPLICATED ASTHMA, UNSPECIFIED ASTHMA SEVERITY, UNSPECIFIED WHETHER PERSISTENT: ICD-10-CM

## 2025-02-07 LAB
2HR DELTA HS TROPONIN: 1 NG/L
4HR DELTA HS TROPONIN: 0 NG/L
ALBUMIN SERPL BCG-MCNC: 4.4 G/DL (ref 3.5–5)
ALP SERPL-CCNC: 72 U/L (ref 34–104)
ALT SERPL W P-5'-P-CCNC: 14 U/L (ref 7–52)
ANION GAP SERPL CALCULATED.3IONS-SCNC: 6 MMOL/L (ref 4–13)
APTT PPP: 30 SECONDS (ref 23–34)
AST SERPL W P-5'-P-CCNC: 16 U/L (ref 13–39)
ATRIAL RATE: 63 BPM
ATRIAL RATE: 63 BPM
ATRIAL RATE: 70 BPM
ATRIAL RATE: 72 BPM
ATRIAL RATE: 74 BPM
ATRIAL RATE: 78 BPM
BASOPHILS # BLD AUTO: 0.04 THOUSANDS/ΜL (ref 0–0.1)
BASOPHILS NFR BLD AUTO: 1 % (ref 0–1)
BILIRUB SERPL-MCNC: 2.02 MG/DL (ref 0.2–1)
BNP SERPL-MCNC: 82 PG/ML (ref 0–100)
BUN SERPL-MCNC: 20 MG/DL (ref 5–25)
CALCIUM SERPL-MCNC: 9.1 MG/DL (ref 8.4–10.2)
CARDIAC TROPONIN I PNL SERPL HS: 6 NG/L (ref ?–50)
CARDIAC TROPONIN I PNL SERPL HS: 6 NG/L (ref ?–50)
CARDIAC TROPONIN I PNL SERPL HS: 7 NG/L (ref ?–50)
CHLORIDE SERPL-SCNC: 99 MMOL/L (ref 96–108)
CO2 SERPL-SCNC: 34 MMOL/L (ref 21–32)
CREAT SERPL-MCNC: 1.26 MG/DL (ref 0.6–1.3)
D DIMER PPP FEU-MCNC: 0.51 UG/ML FEU
EOSINOPHIL # BLD AUTO: 0.16 THOUSAND/ΜL (ref 0–0.61)
EOSINOPHIL NFR BLD AUTO: 3 % (ref 0–6)
ERYTHROCYTE [DISTWIDTH] IN BLOOD BY AUTOMATED COUNT: 13 % (ref 11.6–15.1)
FLUAV RNA RESP QL NAA+PROBE: NEGATIVE
FLUBV RNA RESP QL NAA+PROBE: NEGATIVE
GFR SERPL CREATININE-BSD FRML MDRD: 57 ML/MIN/1.73SQ M
GLUCOSE SERPL-MCNC: 107 MG/DL (ref 65–140)
HCT VFR BLD AUTO: 52.7 % (ref 36.5–49.3)
HGB BLD-MCNC: 17.4 G/DL (ref 12–17)
IMM GRANULOCYTES # BLD AUTO: 0.02 THOUSAND/UL (ref 0–0.2)
IMM GRANULOCYTES NFR BLD AUTO: 0 % (ref 0–2)
INR PPP: 0.93 (ref 0.85–1.19)
LYMPHOCYTES # BLD AUTO: 1.23 THOUSANDS/ΜL (ref 0.6–4.47)
LYMPHOCYTES NFR BLD AUTO: 19 % (ref 14–44)
MAGNESIUM SERPL-MCNC: 2.3 MG/DL (ref 1.9–2.7)
MCH RBC QN AUTO: 30.2 PG (ref 26.8–34.3)
MCHC RBC AUTO-ENTMCNC: 33 G/DL (ref 31.4–37.4)
MCV RBC AUTO: 92 FL (ref 82–98)
MONOCYTES # BLD AUTO: 0.49 THOUSAND/ΜL (ref 0.17–1.22)
MONOCYTES NFR BLD AUTO: 8 % (ref 4–12)
NEUTROPHILS # BLD AUTO: 4.42 THOUSANDS/ΜL (ref 1.85–7.62)
NEUTS SEG NFR BLD AUTO: 69 % (ref 43–75)
NRBC BLD AUTO-RTO: 0 /100 WBCS
P AXIS: 25 DEGREES
P AXIS: 29 DEGREES
P AXIS: 30 DEGREES
P AXIS: 40 DEGREES
P AXIS: 78 DEGREES
P AXIS: 81 DEGREES
PLATELET # BLD AUTO: 178 THOUSANDS/UL (ref 149–390)
PMV BLD AUTO: 9.6 FL (ref 8.9–12.7)
POTASSIUM SERPL-SCNC: 3.5 MMOL/L (ref 3.5–5.3)
PR INTERVAL: 154 MS
PR INTERVAL: 160 MS
PR INTERVAL: 162 MS
PR INTERVAL: 162 MS
PR INTERVAL: 166 MS
PR INTERVAL: 166 MS
PROT SERPL-MCNC: 6.9 G/DL (ref 6.4–8.4)
PROTHROMBIN TIME: 13 SECONDS (ref 12.3–15)
QRS AXIS: -49 DEGREES
QRS AXIS: -57 DEGREES
QRS AXIS: -58 DEGREES
QRS AXIS: -61 DEGREES
QRS AXIS: 245 DEGREES
QRS AXIS: 261 DEGREES
QRSD INTERVAL: 84 MS
QRSD INTERVAL: 86 MS
QRSD INTERVAL: 96 MS
QRSD INTERVAL: 98 MS
QT INTERVAL: 398 MS
QT INTERVAL: 400 MS
QT INTERVAL: 404 MS
QT INTERVAL: 410 MS
QT INTERVAL: 412 MS
QT INTERVAL: 412 MS
QTC INTERVAL: 413 MS
QTC INTERVAL: 421 MS
QTC INTERVAL: 438 MS
QTC INTERVAL: 444 MS
QTC INTERVAL: 453 MS
QTC INTERVAL: 455 MS
RBC # BLD AUTO: 5.76 MILLION/UL (ref 3.88–5.62)
RSV RNA RESP QL NAA+PROBE: NEGATIVE
SARS-COV-2 RNA RESP QL NAA+PROBE: NEGATIVE
SODIUM SERPL-SCNC: 139 MMOL/L (ref 135–147)
T WAVE AXIS: 19 DEGREES
T WAVE AXIS: 206 DEGREES
T WAVE AXIS: 35 DEGREES
T WAVE AXIS: 42 DEGREES
T WAVE AXIS: 47 DEGREES
T WAVE AXIS: 58 DEGREES
TSH SERPL DL<=0.05 MIU/L-ACNC: 2.18 UIU/ML (ref 0.45–4.5)
VENTRICULAR RATE: 63 BPM
VENTRICULAR RATE: 63 BPM
VENTRICULAR RATE: 70 BPM
VENTRICULAR RATE: 72 BPM
VENTRICULAR RATE: 74 BPM
VENTRICULAR RATE: 78 BPM
WBC # BLD AUTO: 6.36 THOUSAND/UL (ref 4.31–10.16)

## 2025-02-07 PROCEDURE — 99285 EMERGENCY DEPT VISIT HI MDM: CPT | Performed by: EMERGENCY MEDICINE

## 2025-02-07 PROCEDURE — 93005 ELECTROCARDIOGRAM TRACING: CPT

## 2025-02-07 PROCEDURE — 85610 PROTHROMBIN TIME: CPT | Performed by: EMERGENCY MEDICINE

## 2025-02-07 PROCEDURE — 36415 COLL VENOUS BLD VENIPUNCTURE: CPT | Performed by: EMERGENCY MEDICINE

## 2025-02-07 PROCEDURE — 71045 X-RAY EXAM CHEST 1 VIEW: CPT

## 2025-02-07 PROCEDURE — 84443 ASSAY THYROID STIM HORMONE: CPT | Performed by: EMERGENCY MEDICINE

## 2025-02-07 PROCEDURE — 99285 EMERGENCY DEPT VISIT HI MDM: CPT

## 2025-02-07 PROCEDURE — 85025 COMPLETE CBC W/AUTO DIFF WBC: CPT | Performed by: EMERGENCY MEDICINE

## 2025-02-07 PROCEDURE — 85379 FIBRIN DEGRADATION QUANT: CPT | Performed by: EMERGENCY MEDICINE

## 2025-02-07 PROCEDURE — 83880 ASSAY OF NATRIURETIC PEPTIDE: CPT | Performed by: EMERGENCY MEDICINE

## 2025-02-07 PROCEDURE — 84484 ASSAY OF TROPONIN QUANT: CPT | Performed by: EMERGENCY MEDICINE

## 2025-02-07 PROCEDURE — 0241U HB NFCT DS VIR RESP RNA 4 TRGT: CPT | Performed by: EMERGENCY MEDICINE

## 2025-02-07 PROCEDURE — 83735 ASSAY OF MAGNESIUM: CPT | Performed by: EMERGENCY MEDICINE

## 2025-02-07 PROCEDURE — 85730 THROMBOPLASTIN TIME PARTIAL: CPT | Performed by: EMERGENCY MEDICINE

## 2025-02-07 PROCEDURE — 80053 COMPREHEN METABOLIC PANEL: CPT | Performed by: EMERGENCY MEDICINE

## 2025-02-07 RX ORDER — CARVEDILOL 12.5 MG/1
12.5 TABLET ORAL ONCE
Status: COMPLETED | OUTPATIENT
Start: 2025-02-07 | End: 2025-02-07

## 2025-02-07 RX ORDER — NITROGLYCERIN 0.4 MG/1
0.4 TABLET SUBLINGUAL
Qty: 25 TABLET | Refills: 0 | Status: SHIPPED | OUTPATIENT
Start: 2025-02-07

## 2025-02-07 RX ORDER — CARVEDILOL 12.5 MG/1
12.5 TABLET ORAL 2 TIMES DAILY WITH MEALS
Status: DISCONTINUED | OUTPATIENT
Start: 2025-02-08 | End: 2025-02-07

## 2025-02-07 RX ORDER — ASPIRIN 81 MG/1
243 TABLET, CHEWABLE ORAL ONCE
Status: COMPLETED | OUTPATIENT
Start: 2025-02-07 | End: 2025-02-07

## 2025-02-07 RX ORDER — CARVEDILOL 12.5 MG/1
TABLET ORAL
Status: DISPENSED
Start: 2025-02-07 | End: 2025-02-08

## 2025-02-07 RX ORDER — ALBUTEROL SULFATE 90 UG/1
2 INHALANT RESPIRATORY (INHALATION) EVERY 6 HOURS PRN
Qty: 8 G | Refills: 0 | Status: SHIPPED | OUTPATIENT
Start: 2025-02-07

## 2025-02-07 RX ADMIN — ASPIRIN 81 MG 243 MG: 81 TABLET ORAL at 15:49

## 2025-02-07 RX ADMIN — CARVEDILOL 12.5 MG: 12.5 TABLET, FILM COATED ORAL at 20:09

## 2025-02-07 NOTE — ED PROVIDER NOTES
Time reflects when diagnosis was documented in both MDM as applicable and the Disposition within this note       Time User Action Codes Description Comment    2/7/2025  9:16 PM Camille Meghana O Add [R07.9] Chest pain     2/7/2025  9:17 PM Camille Meghana O Add [R06.09] BARRETO (dyspnea on exertion)     2/7/2025  9:29 PM Meghana Obrien [J45.909] Uncomplicated asthma, unspecified asthma severity, unspecified whether persistent     2/7/2025  9:29 PM Camille Meghana O Add [I25.10] Coronary artery disease involving native coronary artery of native heart without angina pectoris           ED Disposition       ED Disposition   Discharge    Condition   Stable    Date/Time   Fri Feb 7, 2025  9:17 PM    Comment   Myles APPLE Alexus discharge to home/self care.                   Assessment & Plan       Medical Decision Making  70 yo male with nonspecific chest discomfort to the left side.  Associated with worsening shortness of breath decreased exercise tolerance and increasing dyspnea on exertion.  He has been fatigued.  His asthma is slightly worse will check chest x-ray for lower respiratory tract infection, CHF less likely pneumothorax less likely; will check d- dimer for pulmonary embolism EKG for strain patttern and serial trop. Will check swab for covid/flu/rsb. Bnp for chf eval electrolytes TSH and re-eval;     Cardiology note from 6/2023 reviwewed    Amount and/or Complexity of Data Reviewed  Labs: ordered.  Radiology: ordered.    Risk  OTC drugs.  Prescription drug management.        ED Course as of 02/08/25 0028   Fri Feb 07, 2025   1444 Recheck bp 180/87   1504 No current chest pain assx 155/80    1550 Age adjusted D-dimer is negative   1753 Patient remains assx reviewed there is no evidence of CHF COVID influenza RSV lower respiratory tract infection [place] embolism.  His initial troponin is negative.  Plan on discussing with cardiology after we complete the 4-hour troponin patient verbalizes understanding    2013 Increased BP ordered carvediolol; serial troponin negative awaiting repeat EKG will d/w cariology   2113 Case reviewed with Dr. Whitfield of cardiology reviewed via secure chat with  to discuss obs at [facility] vs. Stat ambulatory referral for cardiology f/u; Dr. Whitfield recommends outpt followup.   2115 Reviewed return precautions patient is Dems definitely exhibiting cardiac symptoms of increasing dyspnea on exertion will place order for stat tress echo echocardiogram with follow-up with cardiology reviewed if he has any new or worsening symptoms do not recommend any strenuous exercises no snow shoveling return with worsening or change in chest pain increasing difficulty breathing sweats numbness tingling lightheadedness or any new or worsening symptoms.       Medications   aspirin chewable tablet 243 mg (243 mg Oral Given 2/7/25 1549)   carvedilol (COREG) tablet 12.5 mg (12.5 mg Oral Given 2/7/25 2009)       ED Risk Strat Scores   HEART Risk Score      Flowsheet Row Most Recent Value   Heart Score Risk Calculator    History 2 Filed at: 02/07/2025 1744   ECG 1 Filed at: 02/07/2025 1744   Age 2 Filed at: 02/07/2025 1744   Risk Factors 2 Filed at: 02/07/2025 1744   Troponin 0 Filed at: 02/07/2025 1744   HEART Score 7 Filed at: 02/07/2025 1744          HEART Risk Score      Flowsheet Row Most Recent Value   Heart Score Risk Calculator    History 2 Filed at: 02/07/2025 1744   ECG 1 Filed at: 02/07/2025 1744   Age 2 Filed at: 02/07/2025 1744   Risk Factors 2 Filed at: 02/07/2025 1744   Troponin 0 Filed at: 02/07/2025 1744   HEART Score 7 Filed at: 02/07/2025 1744                            SBIRT 20yo+      Flowsheet Row Most Recent Value   Initial Alcohol Screen: US AUDIT-C     1. How often do you have a drink containing alcohol? 0 Filed at: 02/07/2025 1420   2. How many drinks containing alcohol do you have on a typical day you are drinking?  0 Filed at: 02/07/2025 1420   3a. Male UNDER 65: How often do you have five or  more drinks on one occasion? 0 Filed at: 02/07/2025 1420   3b. FEMALE Any Age, or MALE 65+: How often do you have 4 or more drinks on one occassion? 0 Filed at: 02/07/2025 1420   Audit-C Score 0 Filed at: 02/07/2025 1420   FRED: How many times in the past year have you...    Used an illegal drug or used a prescription medication for non-medical reasons? Never Filed at: 02/07/2025 1420                            History of Present Illness       Chief Complaint   Patient presents with    Chest Pain     Patient reports a tightness in his chest and SOB for the past few weeks. H/O MI and bypass in the past.        Past Medical History:   Diagnosis Date    Asthma     Atrial fibrillation (HCC)     Coronary artery disease     Fatigue     HTN (hypertension)     Hyperlipidemia     Myocardial infarction (HCC) 02/2019    Stroke (HCC)     TIA (transient ischemic attack)       Past Surgical History:   Procedure Laterality Date    APPENDECTOMY      CARDIAC CATHETERIZATION  04/17/2020    RCA: 80% distal lesion. LCX/OM2 stent placement. LAD with 70-80% lesion at D1 bifurcation.    CARDIAC CATHETERIZATION  2019    OM2 and proximal LCX stenting.    CATARACT EXTRACTION Right     CORONARY ARTERY BYPASS GRAFT      CORONARY STENT PLACEMENT  2019    OM2 and proximal LCX stenting    [place] TEAEC W/PATCH GRF CAROTID VERTB SUBCLAV NECK INC Left 6/18/2022    Procedure: ENDARTERECTOMY ARTERY CAROTID WITH PATCH ANGIOPLASTY;  Surgeon: Diane Griffin DO;  Location: [facility];  Service: Vascular      Family History   Problem Relation Age of Onset    Heart disease Mother     Kidney disease Mother     Heart disease Father       Social History     Tobacco Use    Smoking status: Never    Smokeless tobacco: Never   Vaping Use    Vaping status: Never Used   Substance Use Topics    Alcohol use: Never    Drug use: Never      E-Cigarette/Vaping    E-Cigarette Use Never User       E-Cigarette/Vaping Substances    Nicotine No     THC No     CBD No      Flavoring No     Other No     Unknown No       I have reviewed and agree with the history as documented.     65-year-old male with history of coronary artery disease/MI hyperlipidemia asthma atrial fibs paroxysmal not currently on anticoagulants except for baby aspirin TIA stroke has no residual states that over the last several weeks he has had increasing dyspnea on exertion worsening exercise tolerance and a awareness and feels like a patch over his left chest wall that was not there before he has been fatigued denies exertional chest pain he is currently asymptomatic no fever chills or cough no pleuritic pain no sweats no numbness or tingling but just feels off he denies any lightheadedness or dizziness.  New medications.  Surgical history includes stents prior to his CABG in 2020 appendectomy left carotid endarterectomy cataract extraction        Review of Systems   Constitutional:  Positive for activity change. Negative for appetite change, chills, diaphoresis, fatigue and fever.   HENT:  Negative for congestion, ear pain, rhinorrhea, sneezing and sore throat.    Eyes:  Negative for discharge.   Respiratory:  Positive for shortness of breath. Negative for cough and wheezing.    Cardiovascular:  Negative for chest pain and leg swelling.   Gastrointestinal:  Negative for abdominal pain, blood in stool, diarrhea, nausea and vomiting.   Endocrine: Negative for polyuria.   Genitourinary:  Negative for dysuria, frequency and urgency.   Musculoskeletal:  Negative for back pain and myalgias.   Skin:  Negative for rash.   Neurological:  Negative for dizziness and numbness.   Hematological:  Negative for adenopathy.   Psychiatric/Behavioral:  Negative for confusion.    All other systems reviewed and are negative.          Objective       ED Triage Vitals [02/07/25 1422]   Temperature Pulse Blood Pressure Respirations SpO2 Patient Position - Orthostatic VS   97.5 °F (36.4 °C) 65 (!) 234/102 20 100 % Lying      Temp Source  Heart Rate Source BP Location FiO2 (%) Pain Score    Temporal Monitor Left arm -- No Pain      Vitals      Date and Time Temp Pulse SpO2 Resp BP Pain Score FACES Pain Rating User   02/07/25 2118 97.6 °F (36.4 °C) 54 99 % 18 187/84 No Pain -- EZ   02/07/25 2009 -- 52 -- -- 190/86 -- -- EZ   02/07/25 1945 97.6 °F (36.4 °C) 53 95 % 16 190/85 -- -- EZ   02/07/25 1900 97.6 °F (36.4 °C) 55 95 % 15 189/90 No Pain -- EZ   02/07/25 1800 97.4 °F (36.3 °C) 57 98 % 18 189/90 No Pain -- KS   02/07/25 1700 97.5 °F (36.4 °C) 58 97 % 18 185/84 No Pain -- KS   02/07/25 1600 97.6 °F (36.4 °C) 58 96 % 18 167/75 No Pain -- KS   02/07/25 1511 -- -- -- -- -- No Pain -- KS   02/07/25 1500 97.6 °F (36.4 °C) 63 97 % 18 155/69 No Pain -- KS   02/07/25 1422 97.5 °F (36.4 °C) 65 100 % 20 234/102 No Pain -- SK            Physical Exam  Vitals and nursing note reviewed.   Constitutional:       General: He is not in acute distress.     Appearance: He is well-developed. He is not ill-appearing, toxic-appearing or diaphoretic.   HENT:      Head: Normocephalic and atraumatic.      Right Ear: Tympanic membrane and external ear normal.      Left Ear: Tympanic membrane and external ear normal.      Nose: Nose normal. No congestion or rhinorrhea.      Mouth/Throat:      Mouth: Mucous membranes are moist.      Pharynx: No oropharyngeal exudate or posterior oropharyngeal erythema.   Eyes:      General: No scleral icterus.        Right eye: No discharge.         Left eye: No discharge.      Extraocular Movements: Extraocular movements intact.      Conjunctiva/sclera: Conjunctivae normal.      Pupils: Pupils are equal, round, and reactive to light.   Cardiovascular:      Rate and Rhythm: Normal rate and regular rhythm.      Pulses: Normal pulses.      Heart sounds: Normal heart sounds.   Pulmonary:      Effort: Pulmonary effort is normal. No respiratory distress.      Breath sounds: Normal breath sounds. No stridor. No wheezing, rhonchi or rales.       Comments: No prolongation of exp phase no conversational dyspnea  Abdominal:      General: Bowel sounds are normal. There is no distension.      Palpations: Abdomen is soft. There is no mass.      Tenderness: There is no abdominal tenderness. There is no guarding or rebound.      Comments: Back: no mildline or CVA tenderness   Musculoskeletal:         General: No tenderness or deformity. Normal range of motion.      Cervical back: Normal range of motion and neck supple.      Right lower leg: No edema.      Left lower leg: No edema.   Lymphadenopathy:      Cervical: No cervical adenopathy.   Skin:     General: Skin is warm and dry.   Neurological:      General: No focal deficit present.      Mental Status: He is alert and oriented to person, place, and time.      Cranial Nerves: No cranial nerve deficit.      Sensory: No sensory deficit.      Motor: No weakness or abnormal muscle tone.      Coordination: Coordination normal.      Comments: Gait steady   Psychiatric:         Mood and Affect: Mood normal.         Results Reviewed       Procedure Component Value Units Date/Time    HS Troponin I 4hr [[id number]]  (Normal) Collected: 02/07/25 1909    Lab Status: Final result Specimen: Blood from Arm, Left Updated: 02/07/25 1939     hs TnI 4hr 6 ng/L      Delta 4hr hsTnI 0 ng/L     HS Troponin I 2hr [[id number]]  (Normal) Collected: 02/07/25 1719    Lab Status: Final result Specimen: Blood from Arm, Left Updated: 02/07/25 1805     hs TnI 2hr 7 ng/L      Delta 2hr hsTnI 1 ng/L     FLU/RSV/COVID - if FLU/RSV clinically relevant (2hr TAT) [[id number]]  (Normal) Collected: 02/07/25 1506    Lab Status: Final result Specimen: Nares from Nose Updated: 02/07/25 1555     SARS-CoV-2 Negative     INFLUENZA A PCR Negative     INFLUENZA B PCR Negative     RSV PCR Negative    Narrative:      This test has been performed using the CoV-2/Flu/RSV plus assay on the [employer] GeneXpert platform. This test has been validated by the   and verified by the performing laboratory.     This test is designed to amplify and detect the following: nucleocapsid (N), envelope (E), and RNA-dependent RNA polymerase (RdRP) genes of the SARS-CoV-2 genome; matrix (M), basic polymerase (PB2), and acidic protein (PA) segments of the influenza A genome; matrix (M) and non-structural protein (NS) segments of the influenza B genome, and the nucleocapsid genes of RSV A and RSV B.     Positive results are indicative of the presence of Flu A, Flu B, RSV, and/or SARS-CoV-2 RNA. Positive results for SARS-CoV-2 or suspected novel influenza should be reported to state, local, or federal health departments according to local reporting requirements.      All results should be assessed in conjunction with clinical presentation and other laboratory markers for clinical management.     FOR PEDIATRIC PATIENTS - copy/paste COVID Guidelines URL to browser: [email or web address]       TSH, 3rd generation with Free T4 reflex [[id number]]  (Normal) Collected: 02/07/25 1506    Lab Status: Final result Specimen: Blood from Arm, Left Updated: 02/07/25 1545     TSH 3RD GENERATON 2.182 uIU/mL     HS Troponin 0hr (reflex protocol) [[id number]]  (Normal) Collected: 02/07/25 1506    Lab Status: Final result Specimen: Blood from Arm, Left Updated: 02/07/25 1535     hs TnI 0hr 6 ng/L     B-Type Natriuretic Peptide(BNP) [[id number]]  (Normal) Collected: 02/07/25 1506    Lab Status: Final result Specimen: Blood from Arm, Left Updated: 02/07/25 1535     BNP 82 pg/mL     D-Dimer [[id number]]  (Abnormal) Collected: 02/07/25 1506    Lab Status: Final result Specimen: Blood from Arm, Left Updated: 02/07/25 1530     D-Dimer, Quant 0.51 ug/ml FEU     Narrative:      In the evaluation for possible pulmonary embolism, in the appropriate (Well's Score of 4 or less) patient, the age adjusted d-dimer cutoff for this patient can be calculated as:    Age x 0.01  (in ug/mL) for Age-adjusted D-dimer exclusion threshold for a patient over 50 years.    Comprehensive metabolic panel [[id number]]  (Abnormal) Collected: 02/07/25 1506    Lab Status: Final result Specimen: Blood from Arm, Left Updated: 02/07/25 1529     Sodium 139 mmol/L      Potassium 3.5 mmol/L      Chloride 99 mmol/L      CO2 34 mmol/L      ANION GAP 6 mmol/L      BUN 20 mg/dL      Creatinine 1.26 mg/dL      Glucose 107 mg/dL      Calcium 9.1 mg/dL      AST 16 U/L      ALT 14 U/L      Alkaline Phosphatase 72 U/L      Total Protein 6.9 g/dL      Albumin 4.4 g/dL      Total Bilirubin 2.02 mg/dL      eGFR 57 ml/min/1.73sq m     Narrative:      National Kidney Disease Foundation guidelines for Chronic Kidney Disease (CKD):     Stage 1 with normal or high GFR (GFR > 90 mL/min/1.73 square meters)    Stage 2 Mild CKD (GFR = 60-89 mL/min/1.73 square meters)    Stage 3A Moderate CKD (GFR = 45-59 mL/min/1.73 square meters)    Stage 3B Moderate CKD (GFR = 30-44 mL/min/1.73 square meters)    Stage 4 Severe CKD (GFR = 15-29 mL/min/1.73 square meters)    Stage 5 End Stage CKD (GFR <15 mL/min/1.73 square meters)  Note: GFR calculation is accurate only with a steady state creatinine    Magnesium [[id number]]  (Normal) Collected: 02/07/25 1506    Lab Status: Final result Specimen: Blood from Arm, Left Updated: 02/07/25 1529     Magnesium 2.3 mg/dL     Protime-INR [[id number]]  (Normal) Collected: 02/07/25 1506    Lab Status: Final result Specimen: Blood from Arm, Left Updated: 02/07/25 1524     Protime 13.0 seconds      INR 0.93    Narrative:      INR Therapeutic Range    Indication                                             INR Range      Atrial Fibrillation                                               2.0-3.0  Hypercoagulable State                                    2.0.2.3  Left Ventricular Asist Device                            2.0-3.0  Mechanical Heart Valve                                  -    Aortic(with afib, MI,  embolism, HF, LA enlargement,    and/or coagulopathy)                                     2.0-3.0 (2.5-3.5)     Mitral                                                             2.5-3.5  Prosthetic/Bioprosthetic Heart Valve               2.0-3.0  Venous thromboembolism (VTE: VT, PE        2.0-3.0    APTT [[id number]]  (Normal) Collected: 02/07/25 1506    Lab Status: Final result Specimen: Blood from Arm, Left Updated: 02/07/25 1524     PTT 30 seconds     CBC and differential [[id number]]  (Abnormal) Collected: 02/07/25 1506    Lab Status: Final result Specimen: Blood from Arm, Left Updated: 02/07/25 1512     WBC 6.36 Thousand/uL      RBC 5.76 Million/uL      Hemoglobin 17.4 g/dL      Hematocrit 52.7 %      MCV 92 fL      MCH 30.2 pg      MCHC 33.0 g/dL      RDW 13.0 %      MPV 9.6 fL      Platelets 178 Thousands/uL      nRBC 0 /100 WBCs      Segmented % 69 %      Immature Grans % 0 %      Lymphocytes % 19 %      Monocytes % 8 %      Eosinophils Relative 3 %      Basophils Relative 1 %      Absolute Neutrophils 4.42 Thousands/µL      Absolute Immature Grans 0.02 Thousand/uL      Absolute Lymphocytes 1.23 Thousands/µL      Absolute Monocytes 0.49 Thousand/µL      Eosinophils Absolute 0.16 Thousand/µL      Basophils Absolute 0.04 Thousands/µL             XR chest 1 view portable   Final Interpretation by Ascencion Masterson MD (02/07 1506)      No acute cardiopulmonary disease.            Workstation performed: [id number]             ECG 12 Lead Documentation Only    Date/Time: 2/7/2025 2:29 PM    Performed by: Meghana Obrien MD  Authorized by: Meghana Obrien MD    Indications / Diagnosis:  Chest pain  ECG reviewed by me, the ED Provider: yes    Patient location:  ED  Previous ECG:     Previous ECG:  Compared to current    Comparison ECG info:  8/1955    Similarity:  Changes noted  Quality:     Tracing quality:  Limited by artifact  Rate:     ECG rate:  63    ECG rate assessment: normal    Rhythm:      Rhythm: sinus rhythm    QRS:     QRS axis:  Right  Comments:      : significant artifact questionable ST elevation in aVR will get repeat EKG  ECG 12 Lead Documentation Only    Date/Time: 2/7/2025 2:30 PM    Performed by: Meghana Obrien MD  Authorized by: Meghana Obrien MD    Indications / Diagnosis:  Cp  ECG reviewed by me, the ED Provider: yes    Patient location:  ED  Previous ECG:     Previous ECG:  Compared to current    Comparison ECG info:  8/1955    Similarity:  Changes noted  Rate:     ECG rate:  63    ECG rate assessment: normal    Rhythm:     Rhythm: sinus rhythm    QRS:     QRS axis:  Right  Comments:      ; nonspecific ST-T changes in V5/V6 versus prior from 8/23/2023  ECG 12 Lead Documentation Only    Date/Time: 2/7/2025 8:29 PM    Performed by: Meghana Obrien MD  Authorized by: Meghana Obrien MD    Indications / Diagnosis:  Delta ekg  ECG reviewed by me, the ED Provider: yes    Patient location:  ED  Previous ECG:     Previous ECG:  Compared to current    Comparison ECG info:  2/7/2025 1429    Similarity:  Changes noted  Rate:     ECG rate:  52    ECG rate assessment: bradycardic    Rhythm:     Rhythm: sinus bradycardia    QRS:     QRS axis:  Right  Comments:      ; incomplete RBBB nonspecific twave changes      ED Medication and Procedure Management   Prior to Admission Medications   Prescriptions Last Dose Informant Patient Reported? Taking?   albuterol (PROVENTIL HFA,VENTOLIN HFA) 90 mcg/act inhaler  Self No Yes   Sig: Inhale 2 puffs every 6 (six) hours as needed for wheezing   albuterol (PROVENTIL HFA,VENTOLIN HFA) 90 mcg/act inhaler   No Yes   Sig: Inhale 2 puffs every 6 (six) hours as needed for wheezing   aspirin (ECOTRIN LOW STRENGTH) 81 mg EC tablet  Self No Yes   Sig: Take 1 tablet (81 mg total) by mouth daily   atorvastatin (LIPITOR) 80 mg tablet   No Yes   Sig: TAKE ONE (1) TABLET (80 MG TOTAL) BY MOUTH EVERY EVENING    budesonide-formoterol (Symbicort) 160-4.5 mcg/act inhaler   No Yes   Sig: Inhale 2 puffs 2 (two) times a day Rinse mouth after use.   carvedilol (COREG) 12.5 mg tablet   No Yes   Sig: TAKE ONE (1) TABLET (12.5 MG TOTAL) BY MOUTH TWO (2) (TWO) TIMES A DAY WITH MEALS   ezetimibe (ZETIA) 10 mg tablet   No Yes   Sig: Take 1 tablet (10 mg total) by mouth daily Take one tablet by mouth once daily at night.   losartan-hydrochlorothiazide (HYZAAR) 50-12.5 mg per tablet   No Yes   Sig: TAKE ONE (1) TABLET BY MOUTH DAILY   nitroglycerin (NITROSTAT) 0.4 mg SL tablet   No Yes   Sig: PLACE ONE (1) TABLET (0.4 MG TOTAL) UNDER THE TONGUE EVERY FIVE (5) (FIVE) MINUTES AS NEEDED FOR CHEST PAIN   nitroglycerin (NITROSTAT) 0.4 mg SL tablet   No Yes   Sig: Place 1 tablet (0.4 mg total) under the tongue every 5 (five) minutes as needed for chest pain for up to 25 doses   tamsulosin (FLOMAX) 0.4 mg Not Taking  No No   Sig: Take 1 capsule (0.4 mg total) by mouth daily with dinner   Patient not taking: Reported on 2/7/2025      Facility-Administered Medications: None     Discharge Medication List as of 2/7/2025  9:33 PM        CONTINUE these medications which have CHANGED    Details   albuterol (PROVENTIL HFA,VENTOLIN HFA) 90 mcg/act inhaler Inhale 2 puffs every 6 (six) hours as needed for wheezing, Starting Fri 2/7/2025, Normal      nitroglycerin (NITROSTAT) 0.4 mg SL tablet Place 1 tablet (0.4 mg total) under the tongue every 5 (five) minutes as needed for chest pain for up to 25 doses, Starting Fri 2/7/2025, Normal           CONTINUE these medications which have NOT CHANGED    Details   aspirin (ECOTRIN LOW STRENGTH) 81 mg EC tablet Take 1 tablet (81 mg total) by mouth daily, Starting u 7/28/2022, Normal      atorvastatin (LIPITOR) 80 mg tablet TAKE ONE (1) TABLET (80 MG TOTAL) BY MOUTH EVERY EVENING, Normal      budesonide-formoterol (Symbicort) 160-4.5 mcg/act inhaler Inhale 2 puffs 2 (two) times a day Rinse mouth after use.,  Starting Wed 7/24/2024, Normal      carvedilol (COREG) 12.5 mg tablet TAKE ONE (1) TABLET (12.5 MG TOTAL) BY MOUTH TWO (2) (TWO) TIMES A DAY WITH MEALS, Normal      ezetimibe (ZETIA) 10 mg tablet Take 1 tablet (10 mg total) by mouth daily Take one tablet by mouth once daily at night., Starting Wed 7/24/2024, Normal      losartan-hydrochlorothiazide (HYZAAR) 50-12.5 mg per tablet TAKE ONE (1) TABLET BY MOUTH DAILY, Starting Fri 6/28/2024, Normal      tamsulosin (FLOMAX) 0.4 mg Take 1 capsule (0.4 mg total) by mouth daily with dinner, Starting Wed 7/24/2024, Normal             ED SEPSIS DOCUMENTATION   Time reflects when diagnosis was documented in both MDM as applicable and the Disposition within this note       Time User Action Codes Description Comment    2/7/2025  9:16 PM Meghana Obrien [R07.9] Chest pain     2/7/2025  9:17 PM Meghana Obrien [R06.09] BARRETO (dyspnea on exertion)     2/7/2025  9:29 PM Meghana Obrien [J45.909] Uncomplicated asthma, unspecified asthma severity, unspecified whether persistent     2/7/2025  9:29 PM Meghana Obrien [I25.10] Coronary artery disease involving native coronary artery of native heart without angina pectoris                  Meghana Obrien MD  02/08/25 0028

## 2025-02-08 NOTE — DISCHARGE INSTRUCTIONS
Continue your current medications for now  Follow up with your family docotor and or your cariologist for recheck of blood pressure  Albuterol MDI as needed for shortness of breath/wheezing  No heavy lifting no shoveling activities that require exertion until recheck with your cardiologist  Return to ER or call 911 for worsening or change in chest pain difficulty breathing lightheadedness sweats numbness tingling or any new or worsening symptoms

## 2025-02-11 LAB
ATRIAL RATE: 63 BPM
ATRIAL RATE: 63 BPM
ATRIAL RATE: 74 BPM
ATRIAL RATE: 78 BPM
P AXIS: 29 DEGREES
P AXIS: 40 DEGREES
P AXIS: 78 DEGREES
P AXIS: 81 DEGREES
PR INTERVAL: 160 MS
PR INTERVAL: 162 MS
PR INTERVAL: 162 MS
PR INTERVAL: 166 MS
QRS AXIS: -58 DEGREES
QRS AXIS: -61 DEGREES
QRS AXIS: 245 DEGREES
QRS AXIS: 261 DEGREES
QRSD INTERVAL: 84 MS
QRSD INTERVAL: 86 MS
QRSD INTERVAL: 96 MS
QRSD INTERVAL: 98 MS
QT INTERVAL: 398 MS
QT INTERVAL: 404 MS
QT INTERVAL: 410 MS
QT INTERVAL: 412 MS
QTC INTERVAL: 413 MS
QTC INTERVAL: 421 MS
QTC INTERVAL: 453 MS
QTC INTERVAL: 455 MS
T WAVE AXIS: 19 DEGREES
T WAVE AXIS: 206 DEGREES
T WAVE AXIS: 35 DEGREES
T WAVE AXIS: 42 DEGREES
VENTRICULAR RATE: 63 BPM
VENTRICULAR RATE: 63 BPM
VENTRICULAR RATE: 74 BPM
VENTRICULAR RATE: 78 BPM

## 2025-02-11 PROCEDURE — 93010 ELECTROCARDIOGRAM REPORT: CPT | Performed by: INTERNAL MEDICINE

## 2025-02-13 LAB
ATRIAL RATE: 52 BPM
ATRIAL RATE: 57 BPM
P AXIS: 68 DEGREES
P AXIS: 68 DEGREES
PR INTERVAL: 164 MS
PR INTERVAL: 168 MS
QRS AXIS: -78 DEGREES
QRS AXIS: 1 DEGREES
QRSD INTERVAL: 104 MS
QRSD INTERVAL: 94 MS
QT INTERVAL: 446 MS
QT INTERVAL: 456 MS
QTC INTERVAL: 425 MS
QTC INTERVAL: 434 MS
T WAVE AXIS: 23 DEGREES
T WAVE AXIS: 32 DEGREES
VENTRICULAR RATE: 52 BPM
VENTRICULAR RATE: 57 BPM

## 2025-02-13 PROCEDURE — 93010 ELECTROCARDIOGRAM REPORT: CPT | Performed by: INTERNAL MEDICINE

## 2025-03-06 NOTE — PROGRESS NOTES
Cardiology Follow Up    Myles Vaughan  1955  [id number]  [facility] [address] [address] [phone]    1. Coronary artery disease involving native coronary artery of native heart without angina pectoris  Echo stress test, exercise    nitroglycerin (NITROSTAT) 0.4 mg SL tablet      2. S/P CABG (coronary artery bypass graft)  Echo stress test, exercise      3. Benign essential hypertension  losartan (COZAAR) 50 mg tablet      4. Dyslipidemia        5. History of stroke        6. Stenosis of left carotid artery  VAS carotid complete study      7. History of left-sided carotid endarterectomy  VAS carotid complete study      8. Chest pain  Ambulatory Referral to Cardiology    Echo stress test, exercise      9. BARRETO (dyspnea on exertion)  Ambulatory Referral to Cardiology    Echo stress test, exercise          Discussion/Summary:  Coronary artery disease:   2019 - OM2 and circumflex stenting, 2020 - OM1 and RCA stenting, 2020 - robotic CABG - LIMA-LAD.   On aspirin, statin, beta-blocker  Due to decreased exercise tolerance/dyspnea on exertion will proceed with stress testing. Recommended exercise nuc but patient prefers to not have nuclear testing. Therefore will proceed with stress echo.      Hypertension:   Came down to 142/82 upon my recheck. Patient has not been taking blood pressure medications consistently - sometimes only taking carvedilol once daily and taking losartan/HCTZ sometimes only once or twice a week due to fatigue.  I question if the HCTZ component is causing fatigue. I will send a prescription for just losartan 50 mg daily. He will continue carvedilol at 12.5 mg BID.  Stressed importance of compliance with his medications.   He will call the office in 1 week with updated BP readings.     Prior CVA, carotid artery stenosis s/p left CEA:   follows with our vascular group.  "Continue aspirin, Brilinta, and statin.  Left CEA site is widely patent with <50% right ICA stenosis per duplex from June 2024.  Due in June 2025.    Dyslipidemia:   Lipids are markedly elevated with last LDL of 161. Question compliance. Time did not allow for discussion of this at this visit. Will need to readdress in the future.      He will RTO in 6 months with Dr. Clement or sooner if necessary. He will call with any concerns.    Interval History:   Myles Vaughan is a 69 y.o. male with coronary artery disease s/p prior stent placement to OM2 and circumflex in 2019, s/p RCA and OM1 stenting in 2020 with subsequent robotic CABG in April 2020 - LIMA-LAD, hypertension, dyslipidemia, recent CVA involving the left frontal and left occipital lobes secondary to left carotid artery stenosis s/p left CEA who presents to the office today for overdue follow up.    He was last seen in June 2023.     He was seen in the ER one month ago.  He reports he has been having dyspnea on exertion over the last 2 months or so.  He does walk 7 laps around the track about once a week.  He states he is still able to do this but just notices increased fatigue and decreased exercise tolerance.  He was also having a left-sided chest discomfort described as \"soreness\" or \"like a bruise.\"  He states this was more of a constant sensation.  This is barely noticeable at this time.  While in the ER, he had a CXR negative for acute disease. EKG showed sinus rhythm with incomplete RBBB, nonspecific changes. Patient was referred back to cardiology.     He denies any lower extremity edema.  He denies palpitations.  He denies lightheadedness or dizziness.    He states he does not take his antihypertensive medication consistently.  Sometimes he only takes the carvedilol once daily.  He states the losartan/HCTZ often leaves him feeling very fatigued and drained.    Medical Problems       Problem List       S/P CABG (coronary artery bypass graft)    " Uncomplicated asthma    Dyslipidemia    Coronary artery disease involving native coronary artery of native heart without angina pectoris (Chronic)    Benign essential hypertension    History of stroke    Carotid artery stenosis    Thyroid nodule    Paroxysmal atrial fibrillation (HCC)    Prediabetes    History of left-sided carotid endarterectomy        Past Medical History:   Diagnosis Date    Asthma     Atrial fibrillation (HCC)     Coronary artery disease     Fatigue     HTN (hypertension)     Hyperlipidemia     Myocardial infarction (HCC) 02/2019    Stroke (HCC)     TIA (transient ischemic attack)      Social History     Socioeconomic History    Marital status: /Civil Union     Spouse name: Not on file    Number of children: Not on file    Years of education: Not on file    Highest education level: Not on file   Occupational History    Not on file   Tobacco Use    Smoking status: Never    Smokeless tobacco: Never   Vaping Use    Vaping status: Never Used   Substance and Sexual Activity    Alcohol use: Never    Drug use: Never    Sexual activity: Not Currently   Other Topics Concern    Not on file   Social History Narrative    Not on file     Social Drivers of Health     Financial Resource Strain: Not on file   Food Insecurity: No Food Insecurity (6/16/2022)    Hunger Vital Sign     Worried About Running Out of Food in the Last Year: Never true     Ran Out of Food in the Last Year: Never true   Transportation Needs: No Transportation Needs (6/16/2022)    PRAPARE - Transportation     Lack of Transportation (Medical): No     Lack of Transportation (Non-Medical): No   Physical Activity: Not on file   Stress: Not on file   Social Connections: Not on file   Intimate Partner Violence: Not on file   Housing Stability: Low Risk  (6/16/2022)    Housing Stability Vital Sign     Unable to Pay for Housing in the Last Year: No     Number of Places Lived in the Last Year: 1     Unstable Housing in the Last Year: No       Family History   Problem Relation Age of Onset    Heart disease Mother     Kidney disease Mother     Heart disease Father      Past Surgical History:   Procedure Laterality Date    APPENDECTOMY      CARDIAC CATHETERIZATION  04/17/2020    RCA: 80% distal lesion. LCX/OM2 stent placement. LAD with 70-80% lesion at D1 bifurcation.    CARDIAC CATHETERIZATION  2019    OM2 and proximal LCX stenting.    CATARACT EXTRACTION Right     CORONARY ARTERY BYPASS GRAFT      CORONARY STENT PLACEMENT  2019    OM2 and proximal LCX stenting    NH TEAEC W/PATCH GRF CAROTID VERTB SUBCLAV NECK INC Left 6/18/2022    Procedure: ENDARTERECTOMY ARTERY CAROTID WITH PATCH ANGIOPLASTY;  Surgeon: Diane Griffin DO;  Location: [facility];  Service: Vascular       Current Outpatient Medications:     albuterol (PROVENTIL HFA,VENTOLIN HFA) 90 mcg/act inhaler, Inhale 2 puffs every 6 (six) hours as needed for wheezing, Disp: 8 g, Rfl: 0    aspirin (ECOTRIN LOW STRENGTH) 81 mg EC tablet, Take 1 tablet (81 mg total) by mouth daily, Disp: 90 tablet, Rfl: 3    atorvastatin (LIPITOR) 80 mg tablet, TAKE ONE (1) TABLET (80 MG TOTAL) BY MOUTH EVERY EVENING, Disp: 90 tablet, Rfl: 2    budesonide-formoterol (Symbicort) 160-4.5 mcg/act inhaler, Inhale 2 puffs 2 (two) times a day Rinse mouth after use., Disp: 10.2 g, Rfl: 5    carvedilol (COREG) 12.5 mg tablet, TAKE ONE (1) TABLET (12.5 MG TOTAL) BY MOUTH TWO (2) (TWO) TIMES A DAY WITH MEALS, Disp: 180 tablet, Rfl: 3    ezetimibe (ZETIA) 10 mg tablet, Take 1 tablet (10 mg total) by mouth daily Take one tablet by mouth once daily at night., Disp: 90 tablet, Rfl: 3    losartan (COZAAR) 50 mg tablet, Take 1 tablet (50 mg total) by mouth daily, Disp: 90 tablet, Rfl: 3    nitroglycerin (NITROSTAT) 0.4 mg SL tablet, Place 1 tablet (0.4 mg total) under the tongue every 5 (five) minutes as needed for chest pain for up to 25 doses, Disp: 25 tablet, Rfl: 0    tamsulosin (FLOMAX) 0.4 mg, Take 1 capsule (0.4 mg total)  "by mouth daily with dinner (Patient not taking: Reported on 3/7/2025), Disp: 90 capsule, Rfl: 0  Allergies   Allergen Reactions    Eggs Or Egg-Derived Products - Food Allergy Shortness Of Breath       Labs:     Chemistry        Component Value Date/Time    K 3.5 02/07/2025 1506    CL 99 02/07/2025 1506    CO2 34 (H) 02/07/2025 1506    BUN 20 02/07/2025 1506    CREATININE 1.26 02/07/2025 1506        Component Value Date/Time    CALCIUM 9.1 02/07/2025 1506    ALKPHOS 72 02/07/2025 1506    AST 16 02/07/2025 1506    ALT 14 02/07/2025 1506            No results found for: \"CHOL\"  Lab Results   Component Value Date    HDL 53 07/24/2024    HDL 50 06/02/2023    HDL 47 06/16/2022     Lab Results   Component Value Date    LDLCALC 161 (H) 07/24/2024    LDLCALC 137 (H) 06/02/2023    LDLCALC 130 (H) 06/16/2022     Lab Results   Component Value Date    TRIG 173 (H) 07/24/2024    TRIG 184 (H) 06/02/2023    TRIG 152 (H) 06/16/2022     No results found for: \"CHOLHDL\"    Imaging: XR chest 1 view portable  Result Date: 2/7/2025  Narrative: XR CHEST PORTABLE INDICATION: Chest pain. COMPARISON: 3/21/2021 FINDINGS: Clear lungs. No pneumothorax or pleural effusion. Normal cardiomediastinal silhouette and pulmonary vasculature when accounting for portable technique. Bones are unremarkable for age. Normal upper abdomen.     Impression: No acute cardiopulmonary disease. Workstation performed: [id number]       Review of Systems   All other systems reviewed and are negative.      Vitals:    03/07/25 1444   BP: 142/82   Pulse:    SpO2:      Vitals:    03/07/25 1355   Weight: 110 kg (243 lb 9.6 oz)     Height: 6' 4\" (193 cm)   Body mass index is 29.65 kg/m².    Physical Exam:  Physical Exam  Vitals reviewed.   Constitutional:       General: He is not in acute distress.     Appearance: He is well-developed. He is not diaphoretic.   HENT:      Head: Normocephalic and atraumatic.   Eyes:      Pupils: Pupils are equal, round, and reactive to light. "   Neck:      Vascular: No carotid bruit.   Cardiovascular:      Rate and Rhythm: Normal rate and regular rhythm.      Pulses:           Radial pulses are 2+ on the right side and 2+ on the left side.      Heart sounds: S1 normal and S2 normal. No murmur heard.  Pulmonary:      Effort: Pulmonary effort is normal. No respiratory distress.      Breath sounds: Normal breath sounds. No wheezing or rales.   Abdominal:      General: There is no distension.      Palpations: Abdomen is soft.      Tenderness: There is no abdominal tenderness.   Musculoskeletal:         General: Normal range of motion.      Cervical back: Normal range of motion.      Right lower leg: No edema.      Left lower leg: No edema.   Skin:     General: Skin is warm and dry.      Findings: No erythema.   Neurological:      General: No focal deficit present.      Mental Status: He is alert and oriented to person, place, and time.      Gait: Gait normal.   Psychiatric:         Mood and Affect: Mood normal.         Behavior: Behavior normal.

## 2025-03-07 ENCOUNTER — OFFICE VISIT (OUTPATIENT)
Dept: CARDIOLOGY CLINIC | Facility: HOSPITAL | Age: 70
End: 2025-03-07
Attending: EMERGENCY MEDICINE
Payer: COMMERCIAL

## 2025-03-07 VITALS
HEART RATE: 67 BPM | HEIGHT: 76 IN | DIASTOLIC BLOOD PRESSURE: 82 MMHG | OXYGEN SATURATION: 98 % | SYSTOLIC BLOOD PRESSURE: 142 MMHG | BODY MASS INDEX: 29.67 KG/M2 | WEIGHT: 243.6 LBS

## 2025-03-07 DIAGNOSIS — R07.9 CHEST PAIN: ICD-10-CM

## 2025-03-07 DIAGNOSIS — I25.10 CORONARY ARTERY DISEASE INVOLVING NATIVE CORONARY ARTERY OF NATIVE HEART WITHOUT ANGINA PECTORIS: Primary | Chronic | ICD-10-CM

## 2025-03-07 DIAGNOSIS — E78.5 DYSLIPIDEMIA: ICD-10-CM

## 2025-03-07 DIAGNOSIS — R06.09 DOE (DYSPNEA ON EXERTION): ICD-10-CM

## 2025-03-07 DIAGNOSIS — Z86.73 HISTORY OF STROKE: ICD-10-CM

## 2025-03-07 DIAGNOSIS — Z95.1 S/P CABG (CORONARY ARTERY BYPASS GRAFT): ICD-10-CM

## 2025-03-07 DIAGNOSIS — Z98.890 HISTORY OF LEFT-SIDED CAROTID ENDARTERECTOMY: ICD-10-CM

## 2025-03-07 DIAGNOSIS — I65.22 STENOSIS OF LEFT CAROTID ARTERY: ICD-10-CM

## 2025-03-07 DIAGNOSIS — I10 BENIGN ESSENTIAL HYPERTENSION: ICD-10-CM

## 2025-03-07 PROCEDURE — 99214 OFFICE O/P EST MOD 30 MIN: CPT | Performed by: PHYSICIAN ASSISTANT

## 2025-03-07 RX ORDER — NITROGLYCERIN 0.4 MG/1
0.4 TABLET SUBLINGUAL
Qty: 25 TABLET | Refills: 0 | Status: SHIPPED | OUTPATIENT
Start: 2025-03-07

## 2025-03-07 RX ORDER — LOSARTAN POTASSIUM 50 MG/1
50 TABLET ORAL DAILY
Qty: 90 TABLET | Refills: 3 | Status: SHIPPED | OUTPATIENT
Start: 2025-03-07

## 2025-03-07 NOTE — PATIENT INSTRUCTIONS
We will discontinue your losartan/HCTZ and just continue losartan at 50 mg daily   Continue carvedilol twice daily  Consistency with medications is the most important.  Start checking your blood pressure once daily. The best time to check your blood pressure is at least one hour after you take your blood pressure medication. Keep a log of your blood pressures. Call the office in 1 week ([phone]) with updated blood pressure readings. Goal BP is <130/80.  We will check a stress echo to check the circulation to your heart.   I have ordered your carotid ultrasound which is due in June.

## 2025-03-17 DIAGNOSIS — J45.30 MILD PERSISTENT ASTHMA WITHOUT COMPLICATION: ICD-10-CM

## 2025-03-17 NOTE — TELEPHONE ENCOUNTER
Patient requesting refill(s) of: Symbicort inhaler    Last filled: 7/24/24  Last appt: 7/24/24  Next appt:4/29/25  [facility]

## 2025-03-18 RX ORDER — BUDESONIDE AND FORMOTEROL FUMARATE DIHYDRATE 160; 4.5 UG/1; UG/1
2 AEROSOL RESPIRATORY (INHALATION) 2 TIMES DAILY
Qty: 10.2 G | Refills: 5 | Status: SHIPPED | OUTPATIENT
Start: 2025-03-18

## 2025-06-06 ENCOUNTER — HOSPITAL ENCOUNTER (OUTPATIENT)
Dept: NON INVASIVE DIAGNOSTICS | Facility: HOSPITAL | Age: 70
Discharge: HOME | End: 2025-06-06
Attending: PHYSICIAN ASSISTANT
Payer: COMMERCIAL

## 2025-06-06 DIAGNOSIS — Z98.890 HISTORY OF LEFT-SIDED CAROTID ENDARTERECTOMY: ICD-10-CM

## 2025-06-06 DIAGNOSIS — I65.22 STENOSIS OF LEFT CAROTID ARTERY: ICD-10-CM

## 2025-06-06 PROCEDURE — 93880 EXTRACRANIAL BILAT STUDY: CPT | Performed by: SURGERY

## 2025-06-06 PROCEDURE — 93880 EXTRACRANIAL BILAT STUDY: CPT

## 2025-06-09 ENCOUNTER — RESULTS FOLLOW-UP (OUTPATIENT)
Dept: CARDIOLOGY CLINIC | Facility: HOSPITAL | Age: 70
End: 2025-06-09

## 2025-06-09 NOTE — TELEPHONE ENCOUNTER
----- Message from Jessica Gonzalez PA-C sent at 6/9/2025  7:39 AM EDT -----  Please call patient and let him know that his carotid ultrasound was unchanged from 1 year ago.  The left side that he had surgery on is completely open, and there is mild plaque on the right side.  Nothing else to do at this time.    Thank you!  ----- Message -----  From: Interface, Radiology Results In  Sent: 6/6/2025   9:46 PM EDT  To: Jessica Gonzalez PA-C

## 2025-07-10 NOTE — ASSESSMENT & PLAN NOTE
Quality 226: Preventive Care And Screening: Tobacco Use: Screening And Cessation Intervention: Patient screened for tobacco use and is an ex/non-smoker · History of CAD stents and CABG  · Rule out acute coronary syndrome  · Monitor troponins and telemetry  · Patient was seen by Cardiology mid February in noted fatigued with exertion and had stress echo that was non diagnostic due to resting ST-T wave abnormality  · Cardiology consult Detail Level: Detailed

## 2025-07-22 ENCOUNTER — OFFICE VISIT (OUTPATIENT)
Dept: FAMILY MEDICINE CLINIC | Facility: CLINIC | Age: 70
End: 2025-07-22
Payer: COMMERCIAL

## 2025-07-22 VITALS
DIASTOLIC BLOOD PRESSURE: 80 MMHG | HEART RATE: 83 BPM | WEIGHT: 240.3 LBS | RESPIRATION RATE: 18 BRPM | OXYGEN SATURATION: 94 % | HEIGHT: 76 IN | TEMPERATURE: 97 F | BODY MASS INDEX: 29.26 KG/M2 | SYSTOLIC BLOOD PRESSURE: 142 MMHG

## 2025-07-22 DIAGNOSIS — Z95.1 S/P CABG (CORONARY ARTERY BYPASS GRAFT): ICD-10-CM

## 2025-07-22 DIAGNOSIS — Z86.73 HISTORY OF STROKE: ICD-10-CM

## 2025-07-22 DIAGNOSIS — I65.22 STENOSIS OF LEFT CAROTID ARTERY: ICD-10-CM

## 2025-07-22 DIAGNOSIS — I21.A9 OTHER MYOCARDIAL INFARCTION TYPE (HCC): ICD-10-CM

## 2025-07-22 DIAGNOSIS — Z13.220 SCREENING FOR HYPERLIPIDEMIA: ICD-10-CM

## 2025-07-22 DIAGNOSIS — Z12.5 PROSTATE CANCER SCREENING: ICD-10-CM

## 2025-07-22 DIAGNOSIS — I10 BENIGN ESSENTIAL HYPERTENSION: ICD-10-CM

## 2025-07-22 DIAGNOSIS — I25.10 CORONARY ARTERY DISEASE INVOLVING NATIVE CORONARY ARTERY OF NATIVE HEART WITHOUT ANGINA PECTORIS: Primary | Chronic | ICD-10-CM

## 2025-07-22 DIAGNOSIS — I48.0 PAROXYSMAL ATRIAL FIBRILLATION (HCC): ICD-10-CM

## 2025-07-22 DIAGNOSIS — Z76.89 ENCOUNTER TO ESTABLISH CARE WITH NEW DOCTOR: ICD-10-CM

## 2025-07-22 PROCEDURE — 99214 OFFICE O/P EST MOD 30 MIN: CPT | Performed by: FAMILY MEDICINE

## 2025-07-22 PROCEDURE — G2211 COMPLEX E/M VISIT ADD ON: HCPCS | Performed by: FAMILY MEDICINE

## 2025-07-22 RX ORDER — NITROGLYCERIN 0.4 MG/1
0.4 TABLET SUBLINGUAL
Qty: 25 TABLET | Refills: 0 | Status: SHIPPED | OUTPATIENT
Start: 2025-07-22

## (undated) DEVICE — SUT SILK 2-0 18 IN A185H

## (undated) DEVICE — DRAPE PROBE NEO-PROBE/ULTRASOUND

## (undated) DEVICE — SURGICEL 2 X 3

## (undated) DEVICE — DRAPE SURGIKIT SADDLE BAG

## (undated) DEVICE — BETHL CAROTID ENDARTERECTOMY: Brand: CARDINAL HEALTH

## (undated) DEVICE — LIGACLIP MCA MULTIPLE CLIP APPLIERS, 20 MEDIUM CLIPS: Brand: LIGACLIP

## (undated) DEVICE — GLOVE SRG BIOGEL 7.5

## (undated) DEVICE — 3M™ IOBAN™ 2 ANTIMICROBIAL INCISE DRAPE 6640EZ: Brand: IOBAN™ 2

## (undated) DEVICE — GLOVE INDICATOR PI UNDERGLOVE SZ 8 BLUE

## (undated) DEVICE — SUT MONOCRYL 3-0 SH 27 IN Y416H

## (undated) DEVICE — PETRI DISH STERILE

## (undated) DEVICE — SUT PROLENE 7-0 BV175-6 24 IN M8737

## (undated) DEVICE — CAROTID ARTERY SHUNT KIT,RADIOPAQUE LINE, STRAIGHT: Brand: ARGYLE

## (undated) DEVICE — TELFA NON-ADHERENT ABSORBENT DRESSING: Brand: TELFA

## (undated) DEVICE — SUT PROLENE 6-0 BV130 30 IN 8709H

## (undated) DEVICE — THYROID SHEET: Brand: CONVERTORS

## (undated) DEVICE — 3M™ TEGADERM™ TRANSPARENT FILM DRESSING FRAME STYLE, 1626W, 4 IN X 4-3/4 IN (10 CM X 12 CM), 50/CT 4CT/CASE: Brand: 3M™ TEGADERM™

## (undated) DEVICE — SUT MONOCRYL 4-0 PS-2 18 IN Y496G

## (undated) DEVICE — SURGICEL FIBRILLAR 1 X 2

## (undated) DEVICE — BAG DECANTER

## (undated) DEVICE — ADHESIVE SKIN HIGH VISCOSITY EXOFIN 1ML